# Patient Record
Sex: FEMALE | Race: WHITE | NOT HISPANIC OR LATINO | Employment: OTHER | URBAN - METROPOLITAN AREA
[De-identification: names, ages, dates, MRNs, and addresses within clinical notes are randomized per-mention and may not be internally consistent; named-entity substitution may affect disease eponyms.]

---

## 2017-01-12 ENCOUNTER — TELEPHONE (OUTPATIENT)
Dept: GASTROENTEROLOGY | Facility: CLINIC | Age: 72
End: 2017-01-12

## 2017-01-12 NOTE — TELEPHONE ENCOUNTER
----- Message from Nicole Boykin sent at 1/12/2017  2:50 PM CST -----  Contact: self [phone]  [facility] [place] [place] told her that you must request the CT on [facility] letter head.  Send it to fax # [phone].  Thank you!

## 2017-01-23 ENCOUNTER — OFFICE VISIT (OUTPATIENT)
Dept: GASTROENTEROLOGY | Facility: CLINIC | Age: 72
End: 2017-01-23
Payer: MEDICARE

## 2017-01-23 VITALS
HEART RATE: 75 BPM | SYSTOLIC BLOOD PRESSURE: 163 MMHG | BODY MASS INDEX: 30.88 KG/M2 | WEIGHT: 163.56 LBS | HEIGHT: 61 IN | DIASTOLIC BLOOD PRESSURE: 73 MMHG

## 2017-01-23 DIAGNOSIS — K57.30 DIVERTICULOSIS OF LARGE INTESTINE WITHOUT HEMORRHAGE: ICD-10-CM

## 2017-01-23 DIAGNOSIS — M79.7 FIBROMYALGIA: Primary | ICD-10-CM

## 2017-01-23 DIAGNOSIS — K62.1 RECTAL POLYP: ICD-10-CM

## 2017-01-23 DIAGNOSIS — Z80.0 FAMILY HISTORY OF COLON CANCER: ICD-10-CM

## 2017-01-23 DIAGNOSIS — K58.9 IRRITABLE BOWEL SYNDROME, UNSPECIFIED TYPE: ICD-10-CM

## 2017-01-23 PROCEDURE — 99214 OFFICE O/P EST MOD 30 MIN: CPT | Mod: S$PBB,,, | Performed by: INTERNAL MEDICINE

## 2017-01-23 PROCEDURE — 99212 OFFICE O/P EST SF 10 MIN: CPT | Mod: PBBFAC,PO | Performed by: INTERNAL MEDICINE

## 2017-01-23 PROCEDURE — 99999 PR PBB SHADOW E&M-EST. PATIENT-LVL II: CPT | Mod: PBBFAC,,, | Performed by: INTERNAL MEDICINE

## 2017-01-23 RX ORDER — LEVOMEFOLATE CALCIUM 7.5 MG
15 TABLET ORAL DAILY
COMMUNITY
End: 2023-10-17 | Stop reason: SDUPTHER

## 2017-01-23 RX ORDER — FLUOXETINE HYDROCHLORIDE 20 MG/1
20 CAPSULE ORAL DAILY
Refills: 2 | COMMUNITY
Start: 2016-12-22

## 2017-01-23 RX ORDER — LORATADINE 10 MG/1
10 TABLET ORAL DAILY
COMMUNITY

## 2017-01-23 NOTE — PROGRESS NOTES
"Subjective:       Patient ID: Iram Aguirre is a 71 y.o. female.    This is an established patient.      Chief Complaint: History of colon polyps    HPI Comments: Patient seen for personal history of colon polyps, diagnosed 10 years ago, single in number, unknown size, unknown histology, with associated signs/symptoms of longstanding IBS with waxing and waning change in bowel habits, and alleviating/exacerbating factors including none.  Her last colonoscopy was 2006 with Dr. Bourne.  She admits to a long history of functional GI problems related to anxiety and depression.  She states that she grew up in a dysfunctional family and her siblings all had similar problems as well.  She has a sister who had colon cancer.  She had a rectal polyp in the past but histology is unknown.  She denies bleeding or abdominal pain currently.  She has a long history of diverticulosis and this was again noted on recent CT at outside facility.      Review of Systems   Constitutional: Negative for chills, fatigue and fever.   HENT: Negative for sore throat and trouble swallowing.    Respiratory: Negative for cough, shortness of breath and wheezing.    Cardiovascular: Negative for chest pain and palpitations.   Gastrointestinal: Negative for abdominal pain, blood in stool, nausea and vomiting.        + change in bowel habits     Musculoskeletal: Negative for arthralgias and myalgias.   All other systems reviewed and are negative.      Objective:       Vitals:    01/23/17 1411   BP: (!) 163/73   Pulse: 75   Weight: 74.2 kg (163 lb 9.3 oz)   Height: 5' 0.5" (1.537 m)       Physical Exam   Constitutional: She appears well-developed and well-nourished.   HENT:   Head: Normocephalic and atraumatic.   Eyes: Pupils are equal, round, and reactive to light. No scleral icterus.   Cardiovascular: Normal rate and regular rhythm.    No murmur heard.  Pulmonary/Chest: Effort normal and breath sounds normal. She has no wheezes.   Abdominal: Soft. " Bowel sounds are normal. She exhibits no distension. There is no tenderness. There is no rebound and no guarding.   Neurological: She is alert.   Vitals reviewed.        Lab Results   Component Value Date    WBC 7.0 08/01/2012    HGB 12.7 08/01/2012    HCT 38.5 08/01/2012    MCV 95.1 08/01/2012     08/01/2012         Chemistry        Component Value Date/Time     08/01/2012 1139    K 3.3 (L) 08/01/2012 1139     08/01/2012 1139    CO2 31 08/01/2012 1139    BUN 12 08/01/2012 1139    CREATININE 0.7 08/01/2012 1139    CREATININE 0.9 08/01/2006 1109    GLU 99 08/01/2006 1109        Component Value Date/Time    CALCIUM 10.2 08/01/2012 1139    CALCIUM 10.3 08/01/2006 1109    ALKPHOS 56 08/01/2012 1139    ALKPHOS 75 08/01/2006 1109    AST 25 08/01/2012 1139    AST 28 08/01/2006 1109    ALT 21 08/01/2012 1139    BILITOT 0.7 08/01/2006 1109          CT scan was independently visualized and reviewed by me and showed diverticulosis.      Assessment:       1. Fibromyalgia    2. Rectal polyp    3. Irritable bowel syndrome, unspecified type    4. Diverticulosis of large intestine without hemorrhage    5. Family history of colon cancer        Plan:       1.  Schedule colonoscopy for history of polyps  2.  High fiber diet  3.  Further recommendations to follow after above.

## 2017-02-06 ENCOUNTER — ANESTHESIA (OUTPATIENT)
Dept: ENDOSCOPY | Facility: HOSPITAL | Age: 72
End: 2017-02-06
Payer: MEDICARE

## 2017-02-06 ENCOUNTER — HOSPITAL ENCOUNTER (OUTPATIENT)
Facility: HOSPITAL | Age: 72
Discharge: HOME | End: 2017-02-06
Attending: INTERNAL MEDICINE | Admitting: INTERNAL MEDICINE
Payer: MEDICARE

## 2017-02-06 ENCOUNTER — SURGERY (OUTPATIENT)
Age: 72
End: 2017-02-06

## 2017-02-06 ENCOUNTER — ANESTHESIA EVENT (OUTPATIENT)
Dept: ENDOSCOPY | Facility: HOSPITAL | Age: 72
End: 2017-02-06
Payer: MEDICARE

## 2017-02-06 VITALS — RESPIRATION RATE: 39 BRPM

## 2017-02-06 DIAGNOSIS — Z86.010 HX OF COLONIC POLYPS: ICD-10-CM

## 2017-02-06 DIAGNOSIS — K64.8 INTERNAL HEMORRHOIDS: Primary | ICD-10-CM

## 2017-02-06 PROBLEM — Z86.0100 HX OF COLONIC POLYPS: Status: ACTIVE | Noted: 2017-02-06

## 2017-02-06 PROCEDURE — 45378 DIAGNOSTIC COLONOSCOPY: CPT | Performed by: INTERNAL MEDICINE

## 2017-02-06 PROCEDURE — G0105 COLORECTAL SCRN; HI RISK IND: HCPCS | Mod: ,,, | Performed by: INTERNAL MEDICINE

## 2017-02-06 PROCEDURE — 63600175 PHARM REV CODE 636 W HCPCS: Performed by: NURSE ANESTHETIST, CERTIFIED REGISTERED

## 2017-02-06 PROCEDURE — 25000003 PHARM REV CODE 250: Performed by: INTERNAL MEDICINE

## 2017-02-06 PROCEDURE — D9220A PRA ANESTHESIA: Mod: 33,CRNA,, | Performed by: NURSE ANESTHETIST, CERTIFIED REGISTERED

## 2017-02-06 PROCEDURE — 37000008 HC ANESTHESIA 1ST 15 MINUTES: Performed by: INTERNAL MEDICINE

## 2017-02-06 PROCEDURE — 25000003 PHARM REV CODE 250: Performed by: NURSE ANESTHETIST, CERTIFIED REGISTERED

## 2017-02-06 PROCEDURE — 37000009 HC ANESTHESIA EA ADD 15 MINS: Performed by: INTERNAL MEDICINE

## 2017-02-06 PROCEDURE — G0105 COLORECTAL SCRN; HI RISK IND: HCPCS | Performed by: INTERNAL MEDICINE

## 2017-02-06 PROCEDURE — D9220A PRA ANESTHESIA: Mod: 33,ANES,, | Performed by: ANESTHESIOLOGY

## 2017-02-06 RX ORDER — PROPOFOL 10 MG/ML
INJECTION, EMULSION INTRAVENOUS
Status: DISCONTINUED
Start: 2017-02-06 | End: 2017-02-06 | Stop reason: HOSPADM

## 2017-02-06 RX ORDER — LIDOCAINE HYDROCHLORIDE 20 MG/ML
INJECTION, SOLUTION EPIDURAL; INFILTRATION; INTRACAUDAL; PERINEURAL
Status: DISCONTINUED
Start: 2017-02-06 | End: 2017-02-06 | Stop reason: HOSPADM

## 2017-02-06 RX ORDER — PROPOFOL 10 MG/ML
VIAL (ML) INTRAVENOUS
Status: DISCONTINUED | OUTPATIENT
Start: 2017-02-06 | End: 2017-02-06

## 2017-02-06 RX ORDER — SODIUM CHLORIDE 9 MG/ML
INJECTION, SOLUTION INTRAVENOUS CONTINUOUS
Status: DISCONTINUED | OUTPATIENT
Start: 2017-02-06 | End: 2017-02-06 | Stop reason: HOSPADM

## 2017-02-06 RX ORDER — LIDOCAINE HCL/PF 100 MG/5ML
SYRINGE (ML) INTRAVENOUS
Status: DISCONTINUED | OUTPATIENT
Start: 2017-02-06 | End: 2017-02-06

## 2017-02-06 RX ADMIN — PROPOFOL 50 MG: 10 INJECTION, EMULSION INTRAVENOUS at 11:02

## 2017-02-06 RX ADMIN — PROPOFOL 100 MG: 10 INJECTION, EMULSION INTRAVENOUS at 11:02

## 2017-02-06 RX ADMIN — LIDOCAINE HYDROCHLORIDE 100 MG: 20 INJECTION, SOLUTION INTRAVENOUS at 11:02

## 2017-02-06 RX ADMIN — SODIUM CHLORIDE: 0.9 INJECTION, SOLUTION INTRAVENOUS at 10:02

## 2017-02-06 NOTE — TRANSFER OF CARE
"Anesthesia Transfer of Care Note    Patient: Iram Aguirre    Procedure(s) Performed: Procedure(s) (LRB):  COLONOSCOPY (N/A)    Patient location: GI    Anesthesia Type: general    Transport from OR: Transported from OR on room air with adequate spontaneous ventilation    Post pain: adequate analgesia    Post assessment: no apparent anesthetic complications    Post vital signs: stable    Level of consciousness: alert, oriented and awake    Nausea/Vomiting: no nausea/vomiting    Complications: none          Last vitals:   Visit Vitals    BP (!) 149/70    Pulse 88    Temp 36.6 °C (97.8 °F) (Oral)    Resp (!) 22    Ht 5' 0.5" (1.537 m)    Wt 77.1 kg (170 lb)    SpO2 97%    Breastfeeding No    BMI 32.65 kg/m2     "

## 2017-02-06 NOTE — OR NURSING
Have you had a colonoscopy LESS THAN 3 years ago?   * If YES, answer these questions*:    1. Did patient have a prior colonic polyp in a previous surveillance/diagnostic colonoscopy and is 18 years or older on date of encounter?yes    2. Documentation of < 3 year interval since the patients last colonoscopy due to medical reasons (eg., last colonoscopy incomplete, last colonoscopy had inadequate prep, piecemeal removal of adenomas, or last colonoscopy found > 10 adenomas) ? no

## 2017-02-06 NOTE — ANESTHESIA PREPROCEDURE EVALUATION
02/06/2017  Iram Aguirre is a 71 y.o., female.    [facility] Anesthesia Evaluation    I have reviewed the Patient Summary Reports.    I have reviewed the Nursing Notes.      Review of Systems  Anesthesia Hx:  No problems with previous Anesthesia    Social:  Non-Smoker    Hematology/Oncology:  Hematology Normal       -- Cancer in past history:    Cardiovascular:   Hypertension, well controlled    Pulmonary:   Sleep Apnea, CPAP    Renal/:   Chronic Renal Disease    Hepatic/GI:   PUD, GERD    Musculoskeletal:  Musculoskeletal Normal    Neurological:  Neurology Normal    Endocrine:   Hypothyroidism    Dermatological:  Skin Normal    Psych:   Psychiatric History          Physical Exam  General:  Obesity    Airway/Jaw/Neck:  AIRWAY FINDINGS: Normal      Eyes/Ears/Nose:  EYES/EARS/NOSE FINDINGS: Normal   Dental:  DENTAL FINDINGS: Normal   Chest/Lungs:  Chest/Lungs Findings: Clear to auscultation     Heart/Vascular:  Heart Findings: Rate: Normal  Rhythm: Regular Rhythm  Sounds: Normal  Heart murmur: negative Vascular Findings: Normal    Abdomen:  Abdomen Findings: Normal    Musculoskeletal:  Musculoskeletal Findings: Normal   Skin:  Skin Findings: Normal    Mental Status:  Mental Status Findings: Normal        Anesthesia Plan  Type of Anesthesia, risks & benefits discussed:  Anesthesia Type:  general  Patient's Preference:   Intra-op Monitoring Plan:   Intra-op Monitoring Plan Comments:   Post Op Pain Control Plan:   Post Op Pain Control Plan Comments:   Induction:   IV  Beta Blocker:  Patient is not currently on a Beta-Blocker (No further documentation required).       Informed Consent: Patient understands risks and agrees with Anesthesia plan.  Questions answered. Anesthesia consent signed with patient.  ASA Score: 3     Day of Surgery Review of History & Physical:    H&P update referred to the provider.          Ready For Surgery From Anesthesia Perspective.

## 2017-02-06 NOTE — ANESTHESIA POSTPROCEDURE EVALUATION
"Anesthesia Post Evaluation    Patient: Iram Aguirre    Procedure(s) Performed: Procedure(s) (LRB):  COLONOSCOPY (N/A)    Final Anesthesia Type: general  Patient location during evaluation: PACU  Patient participation: Yes- Able to Participate  Level of consciousness: awake and alert  Post-procedure vital signs: reviewed and stable  Pain management: adequate  Airway patency: patent  PONV status at discharge: No PONV  Anesthetic complications: no      Cardiovascular status: hemodynamically stable  Respiratory status: unassisted and room air  Hydration status: euvolemic  Follow-up not needed.        Visit Vitals    /70    Pulse 72    Temp 36.7 °C (98 °F) (Oral)    Resp 20    Ht 5' 0.5" (1.537 m)    Wt 77.1 kg (170 lb)    SpO2 95%    Breastfeeding No    BMI 32.65 kg/m2       Pain/Damian Score: Pain Assessment Performed: Yes (2/6/2017 12:15 PM)  Presence of Pain: denies (2/6/2017 12:15 PM)  Damian Score: 10 (2/6/2017 12:15 PM)      "

## 2017-02-06 NOTE — IP AVS SNAPSHOT
[address] [place] [address]  Phone: [phone]           Patient Discharge Instructions     Our goal is to set you up for success. This packet includes information on your condition, medications, and your home care. It will help you to care for yourself so you don't get sicker and need to go back to the hospital.     Please ask your nurse if you have any questions.        There are many details to remember when preparing to leave the hospital. Here is what you will need to do:    1. Take your medicine. If you are prescribed medications, review your Medication List in the following pages. You may have new medications to  at the pharmacy and others that you'll need to stop taking. Review the instructions for how and when to take your medications. Talk with your doctor or nurses if you are unsure of what to do.     2. Go to your follow-up appointments. Specific follow-up information is listed in the following pages. Your may be contacted by a transition nurse or clinical provider about future appointments. Be sure we have all of the phone numbers to reach you, if needed. Please contact your provider's office if you are unable to make an appointment.     3. Watch for warning signs. Your doctor or nurse will give you detailed warning signs to watch for and when to call for assistance. These instructions may also include educational information about your condition. If you experience any of warning signs to your health, call your doctor.               [facility]  Unless otherwise directed by your provider, please contact [facility], our nurse care line that is available for 24/7 assistance.     [phone] (toll-free)    Registered nurses in the [facility] provide clinical advisement, health education, appointment booking, and other advisory services.                    ** Verify the list of medication(s) below is accurate and up to date.  Carry this with you in case of emergency. If your medications have changed, please notify your healthcare provider.             Medication List      CONTINUE taking these medications        Additional Info                      amlodipine 5 MG tablet   Commonly known as:  NORVASC   Refills:  0   Dose:  5 mg    Instructions:  Take 5 mg by mouth once daily.     Begin Date    AM    Noon    PM    Bedtime       B-complex with vitamin C tablet   Commonly known as:  Z-Bec or Equiv   Refills:  0   Dose:  1 tablet    Instructions:  Take 1 tablet by mouth once daily.     Begin Date    AM    Noon    PM    Bedtime       co-enzyme Q-10 30 mg capsule   Refills:  0   Dose:  30 mg    Instructions:  Take 30 mg by mouth once daily.     Begin Date    AM    Noon    PM    Bedtime       DEPLIN 7.5 mg Tab tablet   Refills:  0   Dose:  7.5 mg   Generic drug:  levomefolate calcium    Instructions:  Take 7.5 mg by mouth once daily.     Begin Date    AM    Noon    PM    Bedtime       fluoxetine 10 MG capsule   Commonly known as:  PROZAC   Refills:  2   Dose:  10 mg    Instructions:  Take 10 mg by mouth once daily.     Begin Date    AM    Noon    PM    Bedtime       fluticasone 50 mcg/actuation nasal spray   Commonly known as:  FLONASE   Refills:  0      Begin Date    AM    Noon    PM    Bedtime       ipratropium 0.03 % nasal spray   Commonly known as:  ATROVENT   Refills:  0      Begin Date    AM    Noon    PM    Bedtime       Lactobacillus rhamnosus GG 10 billion cell capsule   Commonly known as:  CULTURELLE   Refills:  0   Dose:  1 capsule    Instructions:  Take 1 capsule by mouth once daily.     Begin Date    AM    Noon    PM    Bedtime       losartan-hydrochlorothiazide 100-25 mg 100-25 mg per tablet   Commonly known as:  HYZAAR   Refills:  0      Begin Date    AM    Noon    PM    Bedtime       magnesium 30 mg Tab   Refills:  0   Dose:  30 mg    Instructions:  Take 30 mg by mouth once daily.     Begin Date    AM    Noon    PM    Bedtime        naproxen sodium 220 MG tablet   Commonly known as:  ANAPROX   Refills:  0   Dose:  220 mg    Instructions:  Take 220 mg by mouth daily as needed.     Begin Date    AM    Noon    PM    Bedtime       potassium chloride SA 10 MEQ tablet   Commonly known as:  K-DUR,KLOR-CON   Refills:  0   Dose:  10 mEq    Instructions:  Take 10 mEq by mouth 2 (two) times daily.     Begin Date    AM    Noon    PM    Bedtime       TIROSINT 88 mcg Cap   Refills:  0   Generic drug:  levothyroxine      Begin Date    AM    Noon    PM    Bedtime         ASK your doctor about these medications        Additional Info                      cetirizine 10 MG tablet   Commonly known as:  ZYRTEC   Refills:  0   Dose:  10 mg    Instructions:  Take 10 mg by mouth daily as needed.     Begin Date    AM    Noon    PM    Bedtime       escitalopram oxalate 20 MG tablet   Commonly known as:  LEXAPRO   Refills:  0      Begin Date    AM    Noon    PM    Bedtime       loratadine 10 mg tablet   Commonly known as:  CLARITIN   Refills:  0   Dose:  10 mg    Instructions:  Take 10 mg by mouth once daily.     Begin Date    AM    Noon    PM    Bedtime                  Please bring to all follow up appointments:    1. A copy of your discharge instructions.  2. All medicines you are currently taking in their original bottles.  3. Identification and insurance card.    Please arrive 15 minutes ahead of scheduled appointment time.    Please call 24 hours in advance if you must reschedule your appointment and/or time.        Follow-up Information     Follow up with Arturo Crane MD.    Specialty:  Gastroenterology    Why:  As needed    Contact information:    [address] [facility]  [address]  [place] [address]  [phone]          Discharge Instructions     Future Orders    Activity as tolerated     Diet general     Questions:    Total calories:      Fat restriction, if any:      Protein restriction, if any:      Na restriction, if any:      Fluid restriction:       Additional restrictions:          Discharge Instructions         Hemorrhoids    Hemorrhoids are swollen and inflamed veins inside the rectum and near the anus. The rectum is the last several inches of the colon. The anus is the passage between the rectum and the outside of the body.  Causes  The veins can become swollen due to increased pressure in them. This is most often caused by:  · Chronic constipation or diarrhea  · Straining when having a bowel movement  · Sitting too long on the toilet  · A low-fiber diet  · Pregnancy  Symptoms  · Bleeding from the rectum (this may be noticeable after bowel movements)  · Lump near the anus  · Itching around the anus  · Pain around the anus  There are different types of hemorrhoids. Depending on the type you have and the severity, you may be able to treat yourself at home. In some cases, a procedure may be the best treatment option. Your healthcare provider can tell you more about this, if needed.  Home care  General care  · To get relief from pain or itching, try:  ¨ Topical products. Your healthcare provider may prescribe or recommend creams, ointments, or pads that can be applied to the hemorrhoid. Use these exactly as directed.  ¨ Medicines. Your healthcare provider may recommend stool softeners, suppositories, or laxatives to help manage constipation. Use these exactly as directed.  ¨ Sitz baths. A sitz bath involves sitting in a few inches of warm bath water. Be careful not to make the water so hot that you burn yourself--test it before sitting in it. Soak for about 10 to 15 minutes a few times a day. This may help relieve pain.  Tips to help prevent hemorrhoids  · Eat more fiber. Fiber adds bulk to stool and absorbs water as it moves through your colon. This makes stool softer and easier to pass.  ¨ Increase the fiber in your diet with more fiber-rich foods. These include fresh fruit, vegetables, and whole grains.  ¨ Take a fiber supplement or bulking agent, if advised  "to by your provider. These include products such as psyllium or methylcellulose.  · Drink plenty of water, if directed to by your provider. This can help keep stool soft.  · Be more active. Frequent exercise aids digestion and helps prevent constipation. It may also help make bowel movements more regular.  · Dont strain during bowel movements. This can make hemorrhoids more likely. Also, dont sit on the toilet for long periods of time.  Follow-up care  Follow up with your healthcare provider, or as advised. If a culture or imaging tests were done, you will be notified of the results when they are ready. This may take a few days or longer.  When to seek medical advice  Call your healthcare provider right away if any of these occur:  · Increased bleeding from the rectum  · Increased pain around the rectum or anus  · Weakness or dizziness  Call 911  Call 911 or return to the emergency department right away if any of these occur:  · Trouble breathing or swallowing  · Fainting or loss of consciousness  · Unusually fast heart rate  · Vomiting blood  · Large amounts of blood in stool  Date Last Reviewed: 6/22/2015  © 7304-5318 [employer]. [address] [place] [address]. All rights reserved. This information is not intended as a substitute for professional medical care. Always follow your healthcare professional's instructions.            Admission Information     Date & Time Provider Department CSN    2/6/2017  9:16 AM Arturo Crane MD [facility] [id number]      Care Providers     Provider Role Specialty Primary office phone    Arturo Crane MD Attending Provider Gastroenterology [phone]    Arturo Crane MD Surgeon  Gastroenterology [phone]      Your Vitals Were     BP Pulse Temp Resp Height Weight    123/70 76 98 °F (36.7 °C) (Oral) 20 5' 0.5" (1.537 m) 77.1 kg (170 lb)    SpO2 BMI             95% 32.65 kg/m2         Recent Lab Values     No lab values to " display.      Allergies as of 2/6/2017        Reactions    Cortisone Shortness Of Breath    Causes heart to race    Hair Formula [Mv,iron,min-folic Acid-biotin]     dye    Levaquin [Levofloxacin] Other (See Comments)    Tongue swelling and blisters    Codeine Nausea Only      Advance Directives     An advance directive is a document which, in the event you are no longer able to make decisions for yourself, tells your healthcare team what kind of treatment you do or do not want to receive, or who you would like to make those decisions for you.  If you do not currently have an advance directive, [facility] encourages you to create one.  For more information call:  [phone]), [phone] ([phone]),  or log on to [email or web address].        Language Assistance Services     ATTENTION: Language assistance services are available, free of charge. Please call [phone].      ATENCIÓN: Si habla español, tiene a chatterjee disposición servicios gratuitos de asistencia lingüística. Llame al [phone].     [facility] Ý: N?u b?n nói Ti?ng Vi?t, có các d?ch v? h? tr? ngôn ng? mi?n phí dành cho b?n. G?i s? [phone].         [facility] complies with applicable Federal civil rights laws and does not discriminate on the basis of race, color, national origin, age, disability, or sex.

## 2017-02-06 NOTE — DISCHARGE INSTRUCTIONS
Hemorrhoids    Hemorrhoids are swollen and inflamed veins inside the rectum and near the anus. The rectum is the last several inches of the colon. The anus is the passage between the rectum and the outside of the body.  Causes  The veins can become swollen due to increased pressure in them. This is most often caused by:  · Chronic constipation or diarrhea  · Straining when having a bowel movement  · Sitting too long on the toilet  · A low-fiber diet  · Pregnancy  Symptoms  · Bleeding from the rectum (this may be noticeable after bowel movements)  · Lump near the anus  · Itching around the anus  · Pain around the anus  There are different types of hemorrhoids. Depending on the type you have and the severity, you may be able to treat yourself at home. In some cases, a procedure may be the best treatment option. Your healthcare provider can tell you more about this, if needed.  Home care  General care  · To get relief from pain or itching, try:  ¨ Topical products. Your healthcare provider may prescribe or recommend creams, ointments, or pads that can be applied to the hemorrhoid. Use these exactly as directed.  ¨ Medicines. Your healthcare provider may recommend stool softeners, suppositories, or laxatives to help manage constipation. Use these exactly as directed.  ¨ Sitz baths. A sitz bath involves sitting in a few inches of warm bath water. Be careful not to make the water so hot that you burn yourself--test it before sitting in it. Soak for about 10 to 15 minutes a few times a day. This may help relieve pain.  Tips to help prevent hemorrhoids  · Eat more fiber. Fiber adds bulk to stool and absorbs water as it moves through your colon. This makes stool softer and easier to pass.  ¨ Increase the fiber in your diet with more fiber-rich foods. These include fresh fruit, vegetables, and whole grains.  ¨ Take a fiber supplement or bulking agent, if advised to by your provider. These include products such as psyllium  or methylcellulose.  · Drink plenty of water, if directed to by your provider. This can help keep stool soft.  · Be more active. Frequent exercise aids digestion and helps prevent constipation. It may also help make bowel movements more regular.  · Dont strain during bowel movements. This can make hemorrhoids more likely. Also, dont sit on the toilet for long periods of time.  Follow-up care  Follow up with your healthcare provider, or as advised. If a culture or imaging tests were done, you will be notified of the results when they are ready. This may take a few days or longer.  When to seek medical advice  Call your healthcare provider right away if any of these occur:  · Increased bleeding from the rectum  · Increased pain around the rectum or anus  · Weakness or dizziness  Call 911  Call 911 or return to the emergency department right away if any of these occur:  · Trouble breathing or swallowing  · Fainting or loss of consciousness  · Unusually fast heart rate  · Vomiting blood  · Large amounts of blood in stool  Date Last Reviewed: 6/22/2015 © 2000-2016 [employer]. [address] [place] [address], [place] [address]. All rights reserved. This information is not intended as a substitute for professional medical care. Always follow your healthcare professional's instructions.

## 2017-02-07 VITALS
DIASTOLIC BLOOD PRESSURE: 70 MMHG | OXYGEN SATURATION: 95 % | BODY MASS INDEX: 32.1 KG/M2 | RESPIRATION RATE: 20 BRPM | SYSTOLIC BLOOD PRESSURE: 123 MMHG | HEIGHT: 61 IN | TEMPERATURE: 98 F | HEART RATE: 72 BPM | WEIGHT: 170 LBS

## 2018-05-29 ENCOUNTER — TELEPHONE (OUTPATIENT)
Dept: PODIATRY | Facility: CLINIC | Age: 73
End: 2018-05-29

## 2018-05-29 NOTE — TELEPHONE ENCOUNTER
----- Message from Lizz Morales sent at 5/29/2018 12:19 PM CDT -----  Contact: patient   Patient calling to speak to the Nurse. Please advise.   Call back    Thanks!

## 2018-05-30 ENCOUNTER — HOSPITAL ENCOUNTER (OUTPATIENT)
Dept: RADIOLOGY | Facility: HOSPITAL | Age: 73
Discharge: HOME | End: 2018-05-30
Attending: PODIATRIST
Payer: MEDICARE

## 2018-05-30 ENCOUNTER — TELEPHONE (OUTPATIENT)
Dept: PODIATRY | Facility: CLINIC | Age: 73
End: 2018-05-30

## 2018-05-30 ENCOUNTER — OFFICE VISIT (OUTPATIENT)
Dept: PODIATRY | Facility: CLINIC | Age: 73
End: 2018-05-30
Payer: MEDICARE

## 2018-05-30 VITALS
BODY MASS INDEX: 33.77 KG/M2 | HEIGHT: 60 IN | SYSTOLIC BLOOD PRESSURE: 149 MMHG | WEIGHT: 172 LBS | DIASTOLIC BLOOD PRESSURE: 63 MMHG | HEART RATE: 64 BPM | TEMPERATURE: 98 F

## 2018-05-30 DIAGNOSIS — S92.902A FRACTURE, FOOT, LEFT, CLOSED, INITIAL ENCOUNTER: ICD-10-CM

## 2018-05-30 DIAGNOSIS — M76.822 POSTERIOR TIBIAL TENDINITIS OF LEFT LOWER EXTREMITY: ICD-10-CM

## 2018-05-30 DIAGNOSIS — S92.902A FRACTURE, FOOT, LEFT, CLOSED, INITIAL ENCOUNTER: Primary | ICD-10-CM

## 2018-05-30 PROCEDURE — 99213 OFFICE O/P EST LOW 20 MIN: CPT | Mod: S$PBB,,, | Performed by: PODIATRIST

## 2018-05-30 PROCEDURE — 73630 X-RAY EXAM OF FOOT: CPT | Mod: TC,FY,LT

## 2018-05-30 PROCEDURE — 99213 OFFICE O/P EST LOW 20 MIN: CPT | Mod: 25,PBBFAC | Performed by: PODIATRIST

## 2018-05-30 PROCEDURE — 99999 PR PBB SHADOW E&M-EST. PATIENT-LVL III: CPT | Mod: 25,PBBFAC,, | Performed by: PODIATRIST

## 2018-05-30 PROCEDURE — 73630 X-RAY EXAM OF FOOT: CPT | Mod: 26,LT,, | Performed by: RADIOLOGY

## 2018-05-30 RX ORDER — MELOXICAM 15 MG/1
15 TABLET ORAL DAILY
Qty: 30 TABLET | Refills: 1 | Status: SHIPPED | OUTPATIENT
Start: 2018-05-30 | End: 2021-04-22

## 2018-05-30 NOTE — TELEPHONE ENCOUNTER
----- Message from Glenistheresa Concepcion sent at 5/30/2018 11:01 AM CDT -----  Patient states that she may have broken her foot and need an x-ray today.  Please call patient at [phone].

## 2018-05-30 NOTE — TELEPHONE ENCOUNTER
Left voicemail for patient requesting her to return phone call to office to find out her signs and symptoms today.

## 2018-05-30 NOTE — TELEPHONE ENCOUNTER
----- Message from William Lindsay sent at 5/30/2018 11:53 AM CDT -----  Contact: patient  Type:  Patient Returning Call    Who Called: patient  Who Left Message for Patient:  Delmi  Does the patient know what this is regarding?:  yes  Best Call Back Number:  [phone]  Additional Information:  Requesting a call back

## 2018-06-03 NOTE — PROGRESS NOTES
Subjective:       Patient ID: Iram Aguirre is a 72 y.o. female.    Chief Complaint: Foot Pain and Ankle Pain   Patient presents today she relates severe pain in the left foot she relates no injury or trauma to the area she states that she was out doing work in her yd the very next day she started to have severe discomfort.  HPI  Review of Systems   Musculoskeletal: Positive for arthralgias, gait problem, joint swelling and myalgias.   All other systems reviewed and are negative.      Objective:      Physical Exam   Constitutional: She appears well-developed and well-nourished.   Cardiovascular:   Pulses:       Dorsalis pedis pulses are 1+ on the right side, and 1+ on the left side.        Posterior tibial pulses are 1+ on the right side, and 1+ on the left side.   Musculoskeletal: She exhibits edema, tenderness and deformity.        Left foot: There is deformity.        Feet:    Feet:   Right Foot:   Protective Sensation: 4 sites tested. 4 sites sensed.   Left Foot:   Protective Sensation: 4 sites tested. 4 sites sensed.   Skin Integrity: Positive for erythema and warmth.   Neurological: She is alert.   Skin: Capillary refill takes 2 to 3 seconds.   Psychiatric: She has a normal mood and affect. Her behavior is normal. Judgment and thought content normal.     on evaluation patient displays skin breaks no active signs of infection noted there is considerable inflammation overlying the lateral portion of the patient's left foot as well as overlying the medial portion of the left foot at the insertion of the PT tendon on the navicular.  Patient has more pain in the medial portion of the left foot than the lateral portion increased skin temperature is noted in this area.  Assessment:       1. Fracture, foot, left, closed, initial encounter    2. Posterior tibial tendinitis of left lower extremity        Plan:       Following extensive evaluation discussion patient was working in her yd Monday and Tuesday she did  not injure her foot she did not twist her foot however by today she can barely stand or walk on her foot she is using a cane and can barely get around she states that she has been taking ibuprofen and icing the area I have recommended continued ice elevation I have ordered the patient fracture boot because I am concerned about the possibility of stress fracture even though the x-rays taken today displays no signs of fracturing stress fracture typically does not show up right away on plain film x-rays therefore I feel it would be best to put her in the boot this will also be helpful in addressing the diffuse tendinitis.  Patient advised is likely that she has posterior tibial tendonitis probably from overuse may be possibly even the shoe she was wearing.  Patient is to discontinue ibuprofen that she had been taking of started her on Mobic ice and elevation fracture boot at all times of weight-bearing and will see her for follow-up in 7-10 days to ensure that she is doing better.

## 2018-06-12 ENCOUNTER — OFFICE VISIT (OUTPATIENT)
Dept: PODIATRY | Facility: CLINIC | Age: 73
End: 2018-06-12
Payer: MEDICARE

## 2018-06-12 VITALS
SYSTOLIC BLOOD PRESSURE: 138 MMHG | WEIGHT: 172 LBS | HEIGHT: 61 IN | HEART RATE: 63 BPM | BODY MASS INDEX: 32.47 KG/M2 | DIASTOLIC BLOOD PRESSURE: 72 MMHG

## 2018-06-12 DIAGNOSIS — M76.822 POSTERIOR TIBIAL TENDINITIS OF LEFT LOWER EXTREMITY: Primary | ICD-10-CM

## 2018-06-12 PROCEDURE — 99999 PR PBB SHADOW E&M-EST. PATIENT-LVL III: CPT | Mod: PBBFAC,,, | Performed by: PODIATRIST

## 2018-06-12 PROCEDURE — 99213 OFFICE O/P EST LOW 20 MIN: CPT | Mod: S$PBB,,, | Performed by: PODIATRIST

## 2018-06-12 PROCEDURE — 99213 OFFICE O/P EST LOW 20 MIN: CPT | Mod: PBBFAC,PN | Performed by: PODIATRIST

## 2018-06-16 NOTE — PROGRESS NOTES
Subjective:       Patient ID: Iram Aguirre is a 72 y.o. female.    Chief Complaint: Follow-up   Patient presents today she relates severe pain in the left foot she relates no injury or trauma to the area she states that she was out doing work in her yd the very next day she started to have severe discomfort.  HPI  Review of Systems   Musculoskeletal: Positive for arthralgias, gait problem, joint swelling and myalgias.   All other systems reviewed and are negative.      Objective:      Physical Exam   Constitutional: She appears well-developed and well-nourished.   Cardiovascular:   Pulses:       Dorsalis pedis pulses are 1+ on the right side, and 1+ on the left side.        Posterior tibial pulses are 1+ on the right side, and 1+ on the left side.   Musculoskeletal: She exhibits edema, tenderness and deformity.        Left foot: There is deformity.        Feet:    Feet:   Right Foot:   Protective Sensation: 4 sites tested. 4 sites sensed.   Left Foot:   Protective Sensation: 4 sites tested. 4 sites sensed.   Skin Integrity: Positive for erythema and warmth.   Neurological: She is alert.   Skin: Capillary refill takes 2 to 3 seconds.   Psychiatric: She has a normal mood and affect. Her behavior is normal. Judgment and thought content normal.     on evaluation patient displays skin breaks no active signs of infection noted there is considerable inflammation overlying the lateral portion of the patient's left foot as well as overlying the medial portion of the left foot at the insertion of the PT tendon on the navicular.  Patient has more pain in the medial portion of the left foot than the lateral portion increased skin temperature is noted in this area.  Assessment:       1. Posterior tibial tendinitis of left lower extremity        Plan:         Following evaluation patient states that she is doing much better she has very limited discomfort right now in the left foot along the course of the posterior tibial  tendon there is limited inflammation in this area and overall the patient is doing a lot better she still has some mild swelling overlying the medial aspect of the left foot.  I did have a discussion with the patient she states that she has been tested for diabetes recently her most recent A1c was 5.7 and she was advised that she is at the early stages of borderline diabetes but is not officially diagnosed as a diabetic.  I have recommended continued gradual return to activity patient is to monitor the left foot and not overdo it ice elevation as needed follow-up as needed.

## 2019-11-07 ENCOUNTER — HOSPITAL ENCOUNTER (OUTPATIENT)
Facility: HOSPITAL | Age: 74
Discharge: HOME | End: 2019-11-08
Attending: EMERGENCY MEDICINE | Admitting: INTERNAL MEDICINE
Payer: MEDICARE

## 2019-11-07 DIAGNOSIS — I10 HYPERTENSION, UNSPECIFIED TYPE: Primary | ICD-10-CM

## 2019-11-07 DIAGNOSIS — R07.9 CHEST PAIN: ICD-10-CM

## 2019-11-07 PROBLEM — F41.9 ANXIETY: Status: ACTIVE | Noted: 2019-11-07

## 2019-11-07 LAB
ALBUMIN SERPL BCP-MCNC: 3.9 G/DL (ref 3.5–5.2)
ALP SERPL-CCNC: 65 U/L (ref 55–135)
ALT SERPL W/O P-5'-P-CCNC: 19 U/L (ref 10–44)
ANION GAP SERPL CALC-SCNC: 10 MMOL/L (ref 8–16)
AST SERPL-CCNC: 23 U/L (ref 10–40)
BASOPHILS # BLD AUTO: 0.03 K/UL (ref 0–0.2)
BASOPHILS NFR BLD: 0.4 % (ref 0–1.9)
BILIRUB SERPL-MCNC: 1.1 MG/DL (ref 0.1–1)
BNP SERPL-MCNC: 16 PG/ML (ref 0–99)
BUN SERPL-MCNC: 14 MG/DL (ref 8–23)
CALCIUM SERPL-MCNC: 9.6 MG/DL (ref 8.7–10.5)
CHLORIDE SERPL-SCNC: 100 MMOL/L (ref 95–110)
CO2 SERPL-SCNC: 27 MMOL/L (ref 23–29)
CREAT SERPL-MCNC: 1 MG/DL (ref 0.5–1.4)
DIFFERENTIAL METHOD: ABNORMAL
EOSINOPHIL # BLD AUTO: 0.1 K/UL (ref 0–0.5)
EOSINOPHIL NFR BLD: 1.1 % (ref 0–8)
ERYTHROCYTE [DISTWIDTH] IN BLOOD BY AUTOMATED COUNT: 12.8 % (ref 11.5–14.5)
EST. GFR  (AFRICAN AMERICAN): >60 ML/MIN/1.73 M^2
EST. GFR  (NON AFRICAN AMERICAN): 56 ML/MIN/1.73 M^2
GLUCOSE SERPL-MCNC: 116 MG/DL (ref 70–110)
HCT VFR BLD AUTO: 39.2 % (ref 37–48.5)
HGB BLD-MCNC: 12.8 G/DL (ref 12–16)
IMM GRANULOCYTES # BLD AUTO: 0.02 K/UL (ref 0–0.04)
IMM GRANULOCYTES NFR BLD AUTO: 0.3 % (ref 0–0.5)
INR PPP: 1.1
LYMPHOCYTES # BLD AUTO: 1.9 K/UL (ref 1–4.8)
LYMPHOCYTES NFR BLD: 25.3 % (ref 18–48)
MCH RBC QN AUTO: 31.3 PG (ref 27–31)
MCHC RBC AUTO-ENTMCNC: 32.7 G/DL (ref 32–36)
MCV RBC AUTO: 96 FL (ref 82–98)
MONOCYTES # BLD AUTO: 0.6 K/UL (ref 0.3–1)
MONOCYTES NFR BLD: 8.4 % (ref 4–15)
NEUTROPHILS # BLD AUTO: 4.9 K/UL (ref 1.8–7.7)
NEUTROPHILS NFR BLD: 64.5 % (ref 38–73)
NRBC BLD-RTO: 0 /100 WBC
PLATELET # BLD AUTO: 249 K/UL (ref 150–350)
PMV BLD AUTO: 9.5 FL (ref 9.2–12.9)
POTASSIUM SERPL-SCNC: 3.7 MMOL/L (ref 3.5–5.1)
PROT SERPL-MCNC: 7.5 G/DL (ref 6–8.4)
PROTHROMBIN TIME: 13.8 SEC (ref 10.6–14.8)
RBC # BLD AUTO: 4.09 M/UL (ref 4–5.4)
SODIUM SERPL-SCNC: 137 MMOL/L (ref 136–145)
TROPONIN I SERPL DL<=0.01 NG/ML-MCNC: <0.03 NG/ML (ref 0.02–0.04)
TROPONIN I SERPL DL<=0.01 NG/ML-MCNC: <0.03 NG/ML (ref 0.02–0.04)
WBC # BLD AUTO: 7.52 K/UL (ref 3.9–12.7)

## 2019-11-07 PROCEDURE — 99285 EMERGENCY DEPT VISIT HI MDM: CPT | Mod: 25

## 2019-11-07 PROCEDURE — G0378 HOSPITAL OBSERVATION PER HR: HCPCS

## 2019-11-07 PROCEDURE — 85025 COMPLETE CBC W/AUTO DIFF WBC: CPT

## 2019-11-07 PROCEDURE — 36415 COLL VENOUS BLD VENIPUNCTURE: CPT

## 2019-11-07 PROCEDURE — 84484 ASSAY OF TROPONIN QUANT: CPT

## 2019-11-07 PROCEDURE — 85610 PROTHROMBIN TIME: CPT

## 2019-11-07 PROCEDURE — 80053 COMPREHEN METABOLIC PANEL: CPT

## 2019-11-07 PROCEDURE — 63600175 PHARM REV CODE 636 W HCPCS: Performed by: EMERGENCY MEDICINE

## 2019-11-07 PROCEDURE — 94761 N-INVAS EAR/PLS OXIMETRY MLT: CPT

## 2019-11-07 PROCEDURE — 25000003 PHARM REV CODE 250: Performed by: EMERGENCY MEDICINE

## 2019-11-07 PROCEDURE — 25000003 PHARM REV CODE 250: Performed by: NURSE PRACTITIONER

## 2019-11-07 PROCEDURE — 96372 THER/PROPH/DIAG INJ SC/IM: CPT | Mod: 59

## 2019-11-07 PROCEDURE — 94660 CPAP INITIATION&MGMT: CPT

## 2019-11-07 PROCEDURE — 93005 ELECTROCARDIOGRAM TRACING: CPT

## 2019-11-07 PROCEDURE — 83880 ASSAY OF NATRIURETIC PEPTIDE: CPT

## 2019-11-07 PROCEDURE — 99900035 HC TECH TIME PER 15 MIN (STAT)

## 2019-11-07 PROCEDURE — 63600175 PHARM REV CODE 636 W HCPCS: Performed by: NURSE PRACTITIONER

## 2019-11-07 PROCEDURE — 84484 ASSAY OF TROPONIN QUANT: CPT | Mod: 91

## 2019-11-07 RX ORDER — SODIUM CHLORIDE 0.9 % (FLUSH) 0.9 %
10 SYRINGE (ML) INJECTION
Status: DISCONTINUED | OUTPATIENT
Start: 2019-11-07 | End: 2019-11-08 | Stop reason: HOSPADM

## 2019-11-07 RX ORDER — ENOXAPARIN SODIUM 100 MG/ML
40 INJECTION SUBCUTANEOUS EVERY 24 HOURS
Status: DISCONTINUED | OUTPATIENT
Start: 2019-11-07 | End: 2019-11-08 | Stop reason: HOSPADM

## 2019-11-07 RX ORDER — FLUTICASONE PROPIONATE 50 MCG
1 SPRAY, SUSPENSION (ML) NASAL DAILY
Status: DISCONTINUED | OUTPATIENT
Start: 2019-11-08 | End: 2019-11-08 | Stop reason: HOSPADM

## 2019-11-07 RX ORDER — TERAZOSIN 5 MG/1
5 CAPSULE ORAL NIGHTLY
Status: DISCONTINUED | OUTPATIENT
Start: 2019-11-07 | End: 2019-11-08 | Stop reason: HOSPADM

## 2019-11-07 RX ORDER — PANTOPRAZOLE SODIUM 40 MG/1
40 TABLET, DELAYED RELEASE ORAL
Status: COMPLETED | OUTPATIENT
Start: 2019-11-07 | End: 2019-11-07

## 2019-11-07 RX ORDER — ONDANSETRON 2 MG/ML
4 INJECTION INTRAMUSCULAR; INTRAVENOUS EVERY 8 HOURS PRN
Status: DISCONTINUED | OUTPATIENT
Start: 2019-11-07 | End: 2019-11-08 | Stop reason: HOSPADM

## 2019-11-07 RX ORDER — CETIRIZINE HYDROCHLORIDE 10 MG/1
10 TABLET ORAL DAILY
Status: DISCONTINUED | OUTPATIENT
Start: 2019-11-08 | End: 2019-11-08 | Stop reason: HOSPADM

## 2019-11-07 RX ORDER — LOSARTAN POTASSIUM 50 MG/1
100 TABLET ORAL DAILY
Status: DISCONTINUED | OUTPATIENT
Start: 2019-11-08 | End: 2019-11-08 | Stop reason: HOSPADM

## 2019-11-07 RX ORDER — TERAZOSIN 5 MG/1
5 CAPSULE ORAL NIGHTLY
COMMUNITY
End: 2021-09-08

## 2019-11-07 RX ORDER — LEVOTHYROXINE SODIUM 88 UG/1
88 TABLET ORAL DAILY
Status: DISCONTINUED | OUTPATIENT
Start: 2019-11-08 | End: 2019-11-08 | Stop reason: HOSPADM

## 2019-11-07 RX ORDER — NITROGLYCERIN 0.4 MG/1
0.4 TABLET SUBLINGUAL
Status: COMPLETED | OUTPATIENT
Start: 2019-11-07 | End: 2019-11-07

## 2019-11-07 RX ORDER — LANOLIN ALCOHOL/MO/W.PET/CERES
400 CREAM (GRAM) TOPICAL DAILY
Status: DISCONTINUED | OUTPATIENT
Start: 2019-11-08 | End: 2019-11-08 | Stop reason: HOSPADM

## 2019-11-07 RX ORDER — DIAZEPAM 2 MG/1
2 TABLET ORAL EVERY 8 HOURS PRN
Status: DISCONTINUED | OUTPATIENT
Start: 2019-11-07 | End: 2019-11-08 | Stop reason: HOSPADM

## 2019-11-07 RX ORDER — AMOXICILLIN 250 MG
1 CAPSULE ORAL 2 TIMES DAILY
Status: DISCONTINUED | OUTPATIENT
Start: 2019-11-07 | End: 2019-11-08 | Stop reason: HOSPADM

## 2019-11-07 RX ORDER — HYDROCHLOROTHIAZIDE 25 MG/1
25 TABLET ORAL DAILY
Status: DISCONTINUED | OUTPATIENT
Start: 2019-11-08 | End: 2019-11-08 | Stop reason: HOSPADM

## 2019-11-07 RX ORDER — FLUOXETINE HYDROCHLORIDE 20 MG/1
20 CAPSULE ORAL DAILY
Status: DISCONTINUED | OUTPATIENT
Start: 2019-11-08 | End: 2019-11-08 | Stop reason: HOSPADM

## 2019-11-07 RX ORDER — HYDRALAZINE HYDROCHLORIDE 20 MG/ML
10 INJECTION INTRAMUSCULAR; INTRAVENOUS EVERY 4 HOURS PRN
Status: DISCONTINUED | OUTPATIENT
Start: 2019-11-07 | End: 2019-11-08 | Stop reason: HOSPADM

## 2019-11-07 RX ORDER — ACETAMINOPHEN 325 MG/1
650 TABLET ORAL EVERY 4 HOURS PRN
Status: DISCONTINUED | OUTPATIENT
Start: 2019-11-07 | End: 2019-11-08 | Stop reason: HOSPADM

## 2019-11-07 RX ADMIN — NITROGLYCERIN 0.4 MG: 0.4 TABLET, ORALLY DISINTEGRATING SUBLINGUAL at 03:11

## 2019-11-07 RX ADMIN — NITROGLYCERIN 0.5 INCH: 20 OINTMENT TOPICAL at 03:11

## 2019-11-07 RX ADMIN — TERAZOSIN HYDROCHLORIDE 5 MG: 5 CAPSULE ORAL at 08:11

## 2019-11-07 RX ADMIN — PANTOPRAZOLE SODIUM 40 MG: 40 TABLET, DELAYED RELEASE ORAL at 03:11

## 2019-11-07 RX ADMIN — ENOXAPARIN SODIUM 40 MG: 100 INJECTION SUBCUTANEOUS at 08:11

## 2019-11-07 NOTE — H&P
[facility] Medicine  History & Physical    DOS: 11/07/2019  4:30 PM      Patient Name: Iram Aguirre  MRN: [id number]  Admission Date: 11/7/2019  Attending Physician: Dr. Gomes  Primary Care Provider: Ros Macedo NP         Patient information was obtained from patient, relative(s) and ER records.    Case personally discussed with Dr. Bolden, ER MD    Outside EKG personally reviewed     Subjective:     Principal Problem:Chest pain    Chief Complaint:   Chief Complaint   Patient presents with    Chest Pain        HPI: Ms. Aguirre presents today with complaints of chest pain. It is moderate. It is associated with increased social stressors and SOB. She denies fever, chills, sweats, N/V/D, dizziness, or LOC. Pain is located under the left breast and into the left back, it is reproducible with palpation, and nonexertional. She has a history of HTN, hypothyroid, RDUY, GERD, and HLD. Today, she had a PCP appt to discuss resuming prozac and her social stressors and she reported the chest pain. An EKG was obtained, and her daughter reports there was concern that she had a 2nd degree heart block and EMS was called to transport her to the hospital. The EKG provided by the clinic appears to be atrial bigeminy. She is a patient of Dr. CORTEZ Patrick and had an in-office stress test 2 months ago that was negative. She had an angiogram 2 years ago that showed normal coronaries. He was called per the ED MD and admission was recommended. 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 She also reports worries about her children as well.     Past Medical History:   Diagnosis Date    AR (allergic rhinitis) 11/29/2012    Benign hypertension     Depression     Diverticulosis 2005    Esophageal polyp     Fibromyalgia     Gastric ulcer; benign  2007    GERD (gastroesophageal reflux disease) 11/29/2012    Hypercholesteremia     Hypothyroidism     Kidney stones     Obstructive sleep apnea on CPAP     Osteopenia     Rectal polyp     Skin cancer     pre skin cancer skin    Vulvovaginal melanosis        Past Surgical History:   Procedure Laterality Date    CHOLECYSTECTOMY  08/08/2012    CHOLECYSTECTOMY      COLONOSCOPY N/A 2/6/2017    Procedure: COLONOSCOPY;  Surgeon: Arturo Crane MD;  Location: [facility];  Service: Endoscopy;  Laterality: N/A;    FOOT FRACTURE SURGERY  2005    plate in right foot    GANGLION CYST EXCISION  1980    left wrist    HYSTERECTOMY      KNEE ARTHROSCOPY W/ ACL RECONSTRUCTION      bilateral     NASAL SEPTUM SURGERY      TEAR DUCT SURGERY  2006    left eye    TOTAL KNEE ARTHROPLASTY      bilateral    TOTAL VAGINAL HYSTERECTOMY  age 31    w/BSO    TUBAL LIGATION  age 26       Review of patient's allergies indicates:   Allergen Reactions    Cortisone Shortness Of Breath     Causes heart to race    Hair formula [mv,iron,min-folic acid-biotin]      dye    Levaquin [levofloxacin] Other (See Comments)     Tongue swelling and blisters    Codeine Nausea Only    Doxycycline      Other reaction(s): Unknown       No current facility-administered medications on file prior to encounter.      Current Outpatient Medications on File Prior to Encounter   Medication Sig    B-complex with vitamin C tablet Take 1 tablet by mouth once daily.     co-enzyme Q-10 30 mg capsule Take 30 mg by mouth once daily.     fluoxetine (PROZAC) 10 MG capsule Take 20 mg by mouth once daily.     fluticasone (FLONASE) 50 mcg/actuation nasal spray 1 spray by Each Nostril route daily as needed.     ipratropium (ATROVENT) 0.03 % nasal spray 2 sprays by Nasal route daily as needed.     Lactobacillus rhamnosus GG (CULTURELLE) 10 billion cell capsule Take 1 capsule by mouth once daily.    levomefolate calcium (DEPLIN) 7.5 mg Tab tablet Take 15  mg by mouth once daily.     losartan-hydrochlorothiazide 100-25 mg (HYZAAR) 100-25 mg per tablet Take 1 tablet by mouth once daily.     magnesium 30 mg tablet Take 30 mg by mouth once daily.     mv-mn/folic acid/vit [id number] (ALIVE ONCE DAILY WOMEN 50 PLUS ORAL) Take 1 tablet by mouth once daily.    potassium chloride SA (K-DUR,KLOR-CON) 10 MEQ tablet Take 10 mEq by mouth 2 (two) times daily.     terazosin (HYTRIN) 5 MG capsule Take 5 mg by mouth every evening.    TIROSINT 88 mcg Cap Take 88 mcg by mouth once daily.     amlodipine (NORVASC) 5 MG tablet Take 5 mg by mouth once daily.     cetirizine (ZYRTEC) 10 MG tablet Take 10 mg by mouth daily as needed.     escitalopram oxalate (LEXAPRO) 20 MG tablet     loratadine (CLARITIN) 10 mg tablet Take 10 mg by mouth once daily.    meloxicam (MOBIC) 15 MG tablet Take 1 tablet (15 mg total) by mouth once daily.    naproxen sodium (ANAPROX) 220 MG tablet Take 220 mg by mouth daily as needed.      Family History     Problem Relation (Age of Onset)    Heart disease Father    Kidney disease Mother        Tobacco Use    Smoking status: Never Smoker    Smokeless tobacco: Never Used   Substance and Sexual Activity    Alcohol use: No    Drug use: No    Sexual activity: Not Currently     Partners: Male     Birth control/protection: None     Review of Systems   Constitutional: Negative for activity change, appetite change, chills, diaphoresis, fatigue, fever and unexpected weight change.   HENT: Negative for congestion, ear pain, facial swelling, hearing loss, sore throat and trouble swallowing.    Eyes: Negative for pain and discharge.   Respiratory: Negative for cough, chest tightness, shortness of breath and wheezing.    Cardiovascular: Positive for chest pain. Negative for palpitations and leg swelling.   Gastrointestinal: Negative for abdominal pain, blood in stool, diarrhea, nausea and vomiting.   Endocrine: Negative for polydipsia, polyphagia and polyuria.    Genitourinary: Negative for difficulty urinating, dysuria, flank pain, frequency and urgency.   Musculoskeletal: Positive for back pain. Negative for arthralgias, joint swelling, neck pain and neck stiffness.   Skin: Negative for rash and wound.   Allergic/Immunologic: Negative for environmental allergies and immunocompromised state.   Neurological: Negative for dizziness, seizures, syncope, speech difficulty, weakness, light-headedness, numbness and headaches.   Hematological: Negative for adenopathy.   Psychiatric/Behavioral: Negative for sleep disturbance and suicidal ideas. The patient is nervous/anxious.    All other systems reviewed and are negative.    Objective:     Vital Signs (Most Recent):  Pulse: 68 (11/07/19 1600)  Resp: (!) 34 (11/07/19 1531)  BP: (!) 164/72 (11/07/19 1600)  SpO2: 97 % (11/07/19 1600) Vital Signs (24h Range):  Pulse:  [53-68] 68  Resp:  [18-34] 34  SpO2:  [95 %-99 %] 97 %  BP: (141-165)/(64-73) 164/72   Respirations on exam are 18  Weight: 76.7 kg (169 lb)  Body mass index is 31.93 kg/m².    Physical Exam   Constitutional: She is oriented to person, place, and time. She appears well-developed and well-nourished.   HENT:   Head: Normocephalic and atraumatic.   Eyes: Pupils are equal, round, and reactive to light. EOM are normal.   Neck: Normal range of motion. Neck supple.   Cardiovascular: Normal rate, regular rhythm, normal heart sounds and intact distal pulses.   No murmur heard.  Pulmonary/Chest: Effort normal and breath sounds normal. No stridor. No respiratory distress. She has no wheezes.   Abdominal: Soft. Bowel sounds are normal. She exhibits no distension. There is no tenderness.   Musculoskeletal: Normal range of motion. She exhibits tenderness.   Pain with palpation under left breast and left side, no visible wounds or erythema   Neurological: She is alert and oriented to person, place, and time.   Skin: Skin is warm and dry. Capillary refill takes less than 2 seconds.    Psychiatric:   Tearful on exam   Nursing note and vitals reviewed.        CRANIAL NERVES     CN III, IV, VI   Pupils are equal, round, and reactive to light.  Extraocular motions are normal.        Significant Labs:   CBC:   Recent Labs   Lab 11/07/19  1357   WBC 7.52   HGB 12.8   HCT 39.2        CMP:   Recent Labs   Lab 11/07/19  1357      K 3.7      CO2 27   *   BUN 14   CREATININE 1.0   CALCIUM 9.6   PROT 7.5   ALBUMIN 3.9   BILITOT 1.1*   ALKPHOS 65   AST 23   ALT 19   ANIONGAP 10   EGFRNONAA 56.0*     Cardiac Markers:   Recent Labs   Lab 11/07/19  1357   BNP 16     Troponin:   Recent Labs   Lab 11/07/19  1357   TROPONINI <0.030       Significant Imaging: CXR: I have reviewed all pertinent results/findings within the past 24 hours and my personal findings are:  no acute consolidation noted  I have reviewed all pertinent imaging results/findings within the past 24 hours.     X-ray Chest Ap Portable    Result Date: 11/7/2019  EXAMINATION: XR CHEST AP PORTABLE CLINICAL HISTORY: chest pain; FINDINGS: Portable chest at 1409 compared with 05/17/2018 shows normal cardiomediastinal silhouette. Lungs are clear. Pulmonary vasculature is normal. No acute osseous abnormality.     Negative chest. Electronically signed by: Radu Foote MD Date:    11/07/2019 Time:    14:14  EKG: Sinus bradycardia  Right bundle branch block  Abnormal ECG  No previous ECGs available    Assessment/Plan:     * Chest pain  Atypical/reproducible   Admit to cardiology B   Consult Dr. CORTEZ Patrick - per ER MD who spoke with him  Trend troponins - first set negative  ASA   NTG patch per ER        Anxiety  Add low dose valium PRN while in the hospital   Pt has resumed prozac, but has only been taking for 3 weeks       Benign hypertension  Continue home meds  Hydralazine PRN   monitor      VTE Risk Mitigation (From admission, onward)         Ordered     enoxaparin injection 40 mg  Daily      11/07/19 1554     IP VTE HIGH RISK  PATIENT  Once      11/07/19 1554                   Gretchen Lamb NP  Department of Hospital Medicine   [facility]

## 2019-11-07 NOTE — ASSESSMENT & PLAN NOTE
Add low dose valium PRN while in the hospital   Pt has resumed prozac, but has only been taking for 3 weeks

## 2019-11-07 NOTE — ED NOTES
REPORTS CP PTA.  AT ARRIVAL PER EMS.  NSR.  AMBULATORY AND PAIN FREE.  NO SOB.  LARGE BELCH AT RISING WITH GOOD PAIN RELIEF.  ORIENTED X 3..  MONITORING INITIATED.  2LNC.  EKG DONE.

## 2019-11-07 NOTE — ASSESSMENT & PLAN NOTE
Atypical/reproducible   Admit to cardiology B   Consult Dr. CORTEZ Patrick - per ER MD who spoke with him  Trend troponins - first set negative  ASA   NTG patch per ER

## 2019-11-07 NOTE — ED NOTES
PT PULLED OUT IV ACCESS.  FAMILY AT BS. NSR. STATES SOME SELF RESOLVING DISCOMFORT BELOW L BREAST RADIATING TO BACK AND AXILLA RATED AT 2/10.  REASSESSED BY DR CLEMONS.

## 2019-11-07 NOTE — HPI
Ms. Aguirre presents today with complaints of chest pain. It is moderate. It is associated with increased social stressors and SOB. She denies fever, chills, sweats, N/V/D, dizziness, or LOC. Pain is located under the left breast and into the left back, it is reproducible with palpation, and nonexertional. She has a history of HTN, hypothyroid, RUDY, GERD, and HLD. Today, she had a PCP appt to discuss resuming prozac and her social stressors and she reported the chest pain. An EKG was obtained, and her daughter reports there was concern that she had a 2nd degree heart block and EMS was called to transport her to the hospital. The EKG provided by the clinic appears to be atrial bigeminy. She is a patient of Dr. CORTEZ Patrick and had an in-office stress test 2 months ago that was negative. She had an angiogram 2 years ago that showed normal coronaries. He was called per the ED MD and admission was recommended. 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 She also reports worries about her children as well.

## 2019-11-07 NOTE — ED PROVIDER NOTES
Encounter Date: 11/7/2019       History     Chief Complaint   Patient presents with    Chest Pain     74-year-old female who has a history of depression, fibromyalgia, hypertension, hypothyroidism, kidney stones, GERD, hyperlipidemia, presents emergency room with a history that she has been having left parasternal chest discomfort off and on for the last week.  The patient states that she had about a pain today was more severe if that reason came to the hospital.  The patient states that she was transported from [facility].  He stated and route she was given aspirin and 1 nitroglycerin which helped her pain. There was no associated shortness of breath or nausea.  No palpitations.  The patient states that she did have a stress test approximately 2 months ago which was reported negative. Her cardiologist is Dr. Patrick.  No complaint of any coughing or wheezing.  There is no pleuritic component to her pain.        Review of patient's allergies indicates:   Allergen Reactions    Cortisone Shortness Of Breath     Causes heart to race    Hair formula [mv,iron,min-folic acid-biotin]      dye    Levaquin [levofloxacin] Other (See Comments)     Tongue swelling and blisters    Codeine Nausea Only    Doxycycline      Other reaction(s): Unknown     Past Medical History:   Diagnosis Date    AR (allergic rhinitis) 11/29/2012    Benign hypertension     Depression     Diverticulosis 2005    Esophageal polyp     Fibromyalgia     Gastric ulcer; benign 2007    GERD (gastroesophageal reflux disease) 11/29/2012    Hypercholesteremia     Hypothyroidism     Kidney stones     Obstructive sleep apnea on CPAP     Osteopenia     Rectal polyp     Skin cancer     pre skin cancer skin    Vulvovaginal melanosis      Past Surgical History:   Procedure Laterality Date    CHOLECYSTECTOMY  08/08/2012    CHOLECYSTECTOMY      COLONOSCOPY N/A 2/6/2017    Procedure: COLONOSCOPY;  Surgeon: Arturo Crane MD;   Location: [facility];  Service: Endoscopy;  Laterality: N/A;    FOOT FRACTURE SURGERY  2005    plate in right foot    GANGLION CYST EXCISION  1980    left wrist    HYSTERECTOMY      KNEE ARTHROSCOPY W/ ACL RECONSTRUCTION      bilateral     NASAL SEPTUM SURGERY      TEAR DUCT SURGERY  2006    left eye    TOTAL KNEE ARTHROPLASTY      bilateral    TOTAL VAGINAL HYSTERECTOMY  age 31    w/BSO    TUBAL LIGATION  age 26     Family History   Problem Relation Age of Onset    Heart disease Father     Kidney disease Mother      Social History     Tobacco Use    Smoking status: Never Smoker    Smokeless tobacco: Never Used   Substance Use Topics    Alcohol use: No    Drug use: No     Review of Systems   Constitutional: Negative for activity change, appetite change, chills, diaphoresis and fever.   HENT: Negative for congestion, rhinorrhea, sinus pressure, sore throat and trouble swallowing.    Eyes: Negative for redness.   Respiratory: Negative for shortness of breath.    Cardiovascular: Positive for chest pain.   Gastrointestinal: Negative for abdominal pain, constipation and nausea.   Genitourinary: Negative for dysuria.   Musculoskeletal: Negative for back pain and myalgias.   Skin: Negative for rash.   Neurological: Negative for syncope, weakness, light-headedness, numbness and headaches.   Hematological: Does not bruise/bleed easily.   All other systems reviewed and are negative.      Physical Exam     Initial Vitals   BP Pulse Resp Temp SpO2   11/07/19 1230 11/07/19 1241 11/07/19 1241 -- 11/07/19 1241   (!) 141/64 61 (!) 26  99 %      MAP       --                Physical Exam    Constitutional: She appears well-developed and well-nourished. She is not diaphoretic. No distress.   HENT:   Head: Normocephalic and atraumatic.   Nose: Nose normal.   Mouth/Throat: Oropharynx is clear and moist. No oropharyngeal exudate.   Eyes: Conjunctivae are normal. Pupils are equal, round, and reactive to light. Right eye  exhibits no discharge. Left eye exhibits no discharge.   Neck: Normal range of motion. Neck supple. No thyromegaly present. No JVD present.   Cardiovascular: Normal rate, regular rhythm, normal heart sounds and intact distal pulses. Exam reveals no gallop and no friction rub.    No murmur heard.  Pulmonary/Chest: Breath sounds normal. No respiratory distress. She has no wheezes. She has no rhonchi. She has no rales. She exhibits no tenderness.   Abdominal: Soft. Bowel sounds are normal. She exhibits no distension. There is no tenderness. There is no rebound and no guarding.   Musculoskeletal: Normal range of motion. She exhibits no edema or tenderness.   Neurological: She is alert and oriented to person, place, and time. She has normal strength. No cranial nerve deficit. GCS score is 15. GCS eye subscore is 4. GCS verbal subscore is 5. GCS motor subscore is 6.   Skin: Skin is warm and dry. Capillary refill takes less than 2 seconds. No rash noted. No erythema. No pallor.   Psychiatric: She has a normal mood and affect. Her behavior is normal. Judgment and thought content normal.         ED Course   Procedures  Labs Reviewed   CBC W/ AUTO DIFFERENTIAL - Abnormal; Notable for the following components:       Result Value    Mean Corpuscular Hemoglobin 31.3 (*)     All other components within normal limits   COMPREHENSIVE METABOLIC PANEL - Abnormal; Notable for the following components:    Glucose 116 (*)     Total Bilirubin 1.1 (*)     eGFR if non  56.0 (*)     All other components within normal limits   B-TYPE NATRIURETIC PEPTIDE   TROPONIN I   PROTIME-INR        ECG Results          EKG 12-lead (In process)  Result time 11/07/19 14:09:36    In process by Interface, Lab In [facility] (11/07/19 14:09:36)                 Narrative:    Test Reason : R07.9,    Vent. Rate : 055 BPM     Atrial Rate : 055 BPM     P-R Int : 160 ms          QRS Dur : 124 ms      QT Int : 490 ms       P-R-T Axes : 053 050 026  degrees     QTc Int : 468 ms    Sinus bradycardia  Right bundle branch block  Abnormal ECG  No previous ECGs available    Referred By: [email or web address]   SELF           Confirmed By:                             Imaging Results          X-Ray Chest AP Portable (Final result)  Result time 11/07/19 14:14:44    Final result by Radu Foote MD (11/07/19 14:14:44)                 Impression:      Negative chest.      Electronically signed by: Radu Foote MD  Date:    11/07/2019  Time:    14:14             Narrative:    EXAMINATION:  XR CHEST AP PORTABLE    CLINICAL HISTORY:  chest pain;    FINDINGS:  Portable chest at 1409 compared with 05/17/2018 shows normal cardiomediastinal silhouette.    Lungs are clear. Pulmonary vasculature is normal. No acute osseous abnormality.                                              Attending Attestation:             Attending ED Notes:   This patient presented with chest pain improved with nitrates, has an EKG showing a sinus bradycardia with right bundle but no evidence of any acute injury pattern, ischemia or ectopy.  The patient's chest x-ray is negative.  Labs including BNP, troponin and CBC were normal.  The only abnormality chemistries is a blood sugar of 116.  During the ED course I did speak to Dr. Patrick, her cardiologist who suggested admission to a telemetry floor.  He requested that additional nitrates be given and the patient had orders for topical nitroglycerin.  He also requested that a PPI be added to her regimen and a formal consult him for the patient to be seen on the floor.                        Clinical Impression:       ICD-10-CM ICD-9-CM   1. Hypertension, unspecified type I10 401.9   2. Chest pain R07.9 786.50                             Jason Bolden Jr., MD  11/07/19 [id number]

## 2019-11-08 VITALS
TEMPERATURE: 98 F | BODY MASS INDEX: 32.18 KG/M2 | WEIGHT: 170.44 LBS | OXYGEN SATURATION: 96 % | HEART RATE: 62 BPM | HEIGHT: 61 IN | SYSTOLIC BLOOD PRESSURE: 134 MMHG | RESPIRATION RATE: 17 BRPM | DIASTOLIC BLOOD PRESSURE: 63 MMHG

## 2019-11-08 PROBLEM — R07.9 CHEST PAIN: Status: RESOLVED | Noted: 2019-11-07 | Resolved: 2019-11-08

## 2019-11-08 LAB
ANION GAP SERPL CALC-SCNC: 9 MMOL/L (ref 8–16)
BUN SERPL-MCNC: 21 MG/DL (ref 8–23)
CALCIUM SERPL-MCNC: 9.3 MG/DL (ref 8.7–10.5)
CHLORIDE SERPL-SCNC: 101 MMOL/L (ref 95–110)
CHOLEST SERPL-MCNC: 224 MG/DL (ref 120–199)
CHOLEST/HDLC SERPL: 4 {RATIO} (ref 2–5)
CO2 SERPL-SCNC: 28 MMOL/L (ref 23–29)
CREAT SERPL-MCNC: 1 MG/DL (ref 0.5–1.4)
EST. GFR  (AFRICAN AMERICAN): >60 ML/MIN/1.73 M^2
EST. GFR  (NON AFRICAN AMERICAN): 56 ML/MIN/1.73 M^2
ESTIMATED AVG GLUCOSE: 123 MG/DL (ref 68–131)
GLUCOSE SERPL-MCNC: 117 MG/DL (ref 70–110)
HBA1C MFR BLD HPLC: 5.9 % (ref 4.5–6.2)
HDLC SERPL-MCNC: 56 MG/DL (ref 40–75)
HDLC SERPL: 25 % (ref 20–50)
LDLC SERPL CALC-MCNC: 145 MG/DL (ref 63–159)
MAGNESIUM SERPL-MCNC: 1.8 MG/DL (ref 1.6–2.6)
NONHDLC SERPL-MCNC: 168 MG/DL
POTASSIUM SERPL-SCNC: 3.8 MMOL/L (ref 3.5–5.1)
SODIUM SERPL-SCNC: 138 MMOL/L (ref 136–145)
TRIGL SERPL-MCNC: 115 MG/DL (ref 30–150)
TROPONIN I SERPL DL<=0.01 NG/ML-MCNC: <0.03 NG/ML (ref 0.02–0.04)
TROPONIN I SERPL DL<=0.01 NG/ML-MCNC: <0.03 NG/ML (ref 0.02–0.04)

## 2019-11-08 PROCEDURE — 96374 THER/PROPH/DIAG INJ IV PUSH: CPT

## 2019-11-08 PROCEDURE — 83036 HEMOGLOBIN GLYCOSYLATED A1C: CPT

## 2019-11-08 PROCEDURE — 93005 ELECTROCARDIOGRAM TRACING: CPT

## 2019-11-08 PROCEDURE — 80048 BASIC METABOLIC PNL TOTAL CA: CPT

## 2019-11-08 PROCEDURE — 83735 ASSAY OF MAGNESIUM: CPT

## 2019-11-08 PROCEDURE — 80061 LIPID PANEL: CPT

## 2019-11-08 PROCEDURE — 25000003 PHARM REV CODE 250: Performed by: FAMILY MEDICINE

## 2019-11-08 PROCEDURE — 25000003 PHARM REV CODE 250: Performed by: INTERNAL MEDICINE

## 2019-11-08 PROCEDURE — 25000003 PHARM REV CODE 250: Performed by: NURSE PRACTITIONER

## 2019-11-08 PROCEDURE — G0378 HOSPITAL OBSERVATION PER HR: HCPCS

## 2019-11-08 PROCEDURE — 84484 ASSAY OF TROPONIN QUANT: CPT

## 2019-11-08 PROCEDURE — 36415 COLL VENOUS BLD VENIPUNCTURE: CPT

## 2019-11-08 PROCEDURE — 63600175 PHARM REV CODE 636 W HCPCS: Performed by: INTERNAL MEDICINE

## 2019-11-08 RX ORDER — PREDNISONE 20 MG/1
20 TABLET ORAL 2 TIMES DAILY
Status: DISCONTINUED | OUTPATIENT
Start: 2019-11-08 | End: 2019-11-08 | Stop reason: HOSPADM

## 2019-11-08 RX ORDER — MAGNESIUM SULFATE 1 G/100ML
1 INJECTION INTRAVENOUS ONCE
Status: COMPLETED | OUTPATIENT
Start: 2019-11-08 | End: 2019-11-08

## 2019-11-08 RX ORDER — PREDNISONE 20 MG/1
20 TABLET ORAL 2 TIMES DAILY
Qty: 6 TABLET | Refills: 0 | Status: SHIPPED | OUTPATIENT
Start: 2019-11-08 | End: 2019-11-11

## 2019-11-08 RX ORDER — NAPROXEN SODIUM 220 MG/1
81 TABLET, FILM COATED ORAL DAILY
Refills: 0 | COMMUNITY
Start: 2019-11-09 | End: 2021-04-22

## 2019-11-08 RX ORDER — POTASSIUM CHLORIDE 20 MEQ/1
20 TABLET, EXTENDED RELEASE ORAL ONCE
Status: COMPLETED | OUTPATIENT
Start: 2019-11-08 | End: 2019-11-08

## 2019-11-08 RX ORDER — ATORVASTATIN CALCIUM 40 MG/1
40 TABLET, FILM COATED ORAL NIGHTLY
Status: DISCONTINUED | OUTPATIENT
Start: 2019-11-08 | End: 2019-11-08 | Stop reason: HOSPADM

## 2019-11-08 RX ORDER — NAPROXEN SODIUM 220 MG/1
81 TABLET, FILM COATED ORAL DAILY
Status: DISCONTINUED | OUTPATIENT
Start: 2019-11-08 | End: 2019-11-08 | Stop reason: HOSPADM

## 2019-11-08 RX ORDER — ATORVASTATIN CALCIUM 40 MG/1
40 TABLET, FILM COATED ORAL NIGHTLY
Qty: 30 TABLET | Refills: 0 | Status: SHIPPED | OUTPATIENT
Start: 2019-11-08 | End: 2021-04-22

## 2019-11-08 RX ORDER — NAPROXEN 250 MG/1
250 TABLET ORAL ONCE AS NEEDED
Status: COMPLETED | OUTPATIENT
Start: 2019-11-08 | End: 2019-11-08

## 2019-11-08 RX ADMIN — LACTOBACILLUS TAB 1 TABLET: TAB at 11:11

## 2019-11-08 RX ADMIN — HYDROCHLOROTHIAZIDE 25 MG: 25 TABLET ORAL at 10:11

## 2019-11-08 RX ADMIN — POTASSIUM CHLORIDE 20 MEQ: 20 TABLET, EXTENDED RELEASE ORAL at 11:11

## 2019-11-08 RX ADMIN — PREDNISONE 20 MG: 20 TABLET ORAL at 11:11

## 2019-11-08 RX ADMIN — ACETAMINOPHEN 650 MG: 325 TABLET ORAL at 02:11

## 2019-11-08 RX ADMIN — ACETAMINOPHEN 650 MG: 325 TABLET ORAL at 07:11

## 2019-11-08 RX ADMIN — FLUOXETINE 20 MG: 20 CAPSULE ORAL at 10:11

## 2019-11-08 RX ADMIN — LEVOTHYROXINE SODIUM 88 MCG: 88 TABLET ORAL at 05:11

## 2019-11-08 RX ADMIN — MAGNESIUM OXIDE 400 MG: 400 TABLET ORAL at 10:11

## 2019-11-08 RX ADMIN — CETIRIZINE HYDROCHLORIDE 10 MG: 10 TABLET, FILM COATED ORAL at 10:11

## 2019-11-08 RX ADMIN — NAPROXEN 250 MG: 250 TABLET ORAL at 03:11

## 2019-11-08 RX ADMIN — MAGNESIUM SULFATE HEPTAHYDRATE 1 G: 1 INJECTION, SOLUTION INTRAVENOUS at 11:11

## 2019-11-08 RX ADMIN — LACTOBACILLUS TAB 1 TABLET: TAB at 07:11

## 2019-11-08 RX ADMIN — ASPIRIN 81 MG 81 MG: 81 TABLET ORAL at 10:11

## 2019-11-08 RX ADMIN — LOSARTAN POTASSIUM 100 MG: 50 TABLET, FILM COATED ORAL at 10:11

## 2019-11-08 NOTE — DISCHARGE INSTRUCTIONS
Notify MD of any new or ususual shortness of breath or any rapid or irregular heartbeats or any pain not relieved by medication.  Notify MD of any temp greater than 100.4  Notify MD of any dizziness episodes

## 2019-11-08 NOTE — PLAN OF CARE
Pt oriented to unit. Fall/safety precautions and plan of care reviewed with pt, pt voices understanding. Pt NPO after midnight. CPAP at night. EKG obtained for shoulder pain, MD notifed. Pain controlled with prn medication. Cardiac, labs, and vital sign monitoring ongoing.

## 2019-11-08 NOTE — CONSULTS
"[facility]  Cardiology  Consult    Patient Name: Iram Aguirre  MRN: [id number]  Admission Date: 11/7/2019  Code Status: Full Code   Attending Provider: Jeferson Talley MD   Primary Care Physician: Ros Macedo NP  Principal Problem:Chest pain    Patient information was obtained from patient, relative(s) and ER records.     Subjective:     Chief Complaint:  Chest pain     Per admit note  "HPI: Ms. Aguirre presents today with complaints of chest pain. It is moderate. It is associated with increased social stressors and SOB. She denies fever, chills, sweats, N/V/D, dizziness, or LOC. Pain is located under the left breast and into the left back, it is reproducible with palpation, and nonexertional. She has a history of HTN, hypothyroid, RUDY, GERD, and HLD. Today, she had a PCP appt to discuss resuming prozac and her social stressors and she reported the chest pain. An EKG was obtained, and her daughter reports there was concern that she had a 2nd degree heart block and EMS was called to transport her to the hospital. The EKG provided by the clinic appears to be atrial bigeminy. She is a patient of Dr. CORTEZ Patrick and had an in-office stress test 2 months ago that was negative. She had an angiogram 2 years ago that showed normal coronaries. He was called per the ED MD and admission was recommended. 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 She also reports worries about her children as well. "    Patient has  Been severely depressed due to multiple events in her family and her advanced illness of her .  She was started back on Prozac for a week ago  She has been helping him in personal care as well including bathing him and physical lifting him.  Reportedly patient had developed left-sided of left axillary pain of giving him a " shower and getting him ready to.  She felt very fatigued and tired and she has diffuse musculoskeletal pains as well as a left leg pain or subsequently she has seen the primary care physician the following day although she has reproducible pain upon the she has some atrial arrhythmias and she was sent to the ER by ambulance.  Since admission her troponins have been negative no further episodes of arrhythmias identified.  And patient has some musculoskeletal pains persisting but improved.  She normally takes Aleve with some moderate improvement of her symptoms.  She also suffers from symptoms of fibromyalgia and feels like he has exacerbated her.  She denies having any acute shortness of breath no cough or congestion no fevers or chills no nausea vomiting no blood in the stools.      Past Medical History:   Diagnosis Date    AR (allergic rhinitis) 11/29/2012    Benign hypertension     Depression     Diverticulosis 2005    Esophageal polyp     Fibromyalgia     Gastric ulcer; benign 2007    GERD (gastroesophageal reflux disease) 11/29/2012    Hypercholesteremia     Hypothyroidism     Kidney stones     MTHFR gene mutation     per daughter E72.8    Obstructive sleep apnea on CPAP     Osteopenia     Rectal polyp     Skin cancer     pre skin cancer skin    Vulvovaginal melanosis        Past Surgical History:   Procedure Laterality Date    CHOLECYSTECTOMY  08/08/2012    CHOLECYSTECTOMY      COLONOSCOPY N/A 2/6/2017    Procedure: COLONOSCOPY;  Surgeon: Arturo Crane MD;  Location[facility];  Service: Endoscopy;  Laterality: N/A;    FOOT FRACTURE SURGERY  2005    plate in right foot    GANGLION CYST EXCISION  1980    left wrist    HYSTERECTOMY      KNEE ARTHROSCOPY W/ ACL RECONSTRUCTION      bilateral     NASAL SEPTUM SURGERY      TEAR DUCT SURGERY  2006    left eye    TOTAL KNEE ARTHROPLASTY      bilateral    TOTAL VAGINAL HYSTERECTOMY  age 31    w/BSO    TUBAL LIGATION  age 26       Review  of patient's allergies indicates:   Allergen Reactions    Cortisone Shortness Of Breath     Causes heart to race    Hair formula [mv,iron,min-folic acid-biotin]      dye    Levaquin [levofloxacin] Other (See Comments)     Tongue swelling and blisters    Codeine Nausea Only    Doxycycline      Other reaction(s): Unknown       No current facility-administered medications on file prior to encounter.      Current Outpatient Medications on File Prior to Encounter   Medication Sig    B-complex with vitamin C tablet Take 1 tablet by mouth once daily.     co-enzyme Q-10 30 mg capsule Take 30 mg by mouth once daily.     fluoxetine (PROZAC) 10 MG capsule Take 20 mg by mouth once daily.     fluticasone (FLONASE) 50 mcg/actuation nasal spray 1 spray by Each Nostril route daily as needed.     ipratropium (ATROVENT) 0.03 % nasal spray 2 sprays by Nasal route daily as needed.     Lactobacillus rhamnosus GG (CULTURELLE) 10 billion cell capsule Take 1 capsule by mouth once daily.    levomefolate calcium (DEPLIN) 7.5 mg Tab tablet Take 15 mg by mouth once daily.     losartan-hydrochlorothiazide 100-25 mg (HYZAAR) 100-25 mg per tablet Take 1 tablet by mouth once daily.     magnesium 30 mg tablet Take 30 mg by mouth once daily.     mv-mn/folic acid/vit [id number] (ALIVE ONCE DAILY WOMEN 50 PLUS ORAL) Take 1 tablet by mouth once daily.    potassium chloride SA (K-DUR,KLOR-CON) 10 MEQ tablet Take 10 mEq by mouth 2 (two) times daily.     terazosin (HYTRIN) 5 MG capsule Take 5 mg by mouth every evening.    TIROSINT 88 mcg Cap Take 88 mcg by mouth once daily.     amlodipine (NORVASC) 5 MG tablet Take 5 mg by mouth once daily.     cetirizine (ZYRTEC) 10 MG tablet Take 10 mg by mouth daily as needed.     escitalopram oxalate (LEXAPRO) 20 MG tablet     loratadine (CLARITIN) 10 mg tablet Take 10 mg by mouth once daily.    meloxicam (MOBIC) 15 MG tablet Take 1 tablet (15 mg total) by mouth once daily.    naproxen sodium  (ANAPROX) 220 MG tablet Take 220 mg by mouth daily as needed.      Family History     Problem Relation (Age of Onset)    Heart disease Father    Kidney disease Mother        Tobacco Use    Smoking status: Never Smoker    Smokeless tobacco: Never Used   Substance and Sexual Activity    Alcohol use: No    Drug use: No    Sexual activity: Not Currently     Partners: Male     Birth control/protection: None       REVIEW OF SYSTEMS:    Constitutional: Negative for chills, fatigue and fever.   Eyes: No double vision, No blurred vision  Neuro: No headaches, No dizziness  Respiratory: Negative for cough, shortness of breath and wheezing.    Cardiovascular:  Positive for chest wall pain. Negative for palpitations and leg swelling.   Gastrointestinal: Negative for abdominal pain, No melena, diarrhea, nausea and vomiting.   Genitourinary: Negative for dysuria and frequency, Negative for hematuria  Skin: Negative for bruising, Negative for edema or discoloration noted.   Endocrine: Negative for polyphagia, Negative for heat intolerance, Negative for cold intolerance  Psychiatric: Negative for depression, Negative for anxiety, Negative for memory loss  Musculoskeletal:  Intermittent episodes of neck back and muscle aches.  Objective:     Vital Signs (Most Recent):  Temp: 97.9 °F (36.6 °C) (11/08/19 0741)  Pulse: 77 (11/08/19 0741)  Resp: 20 (11/08/19 0741)  BP: 139/61 (11/08/19 0741)  SpO2: 97 % (11/08/19 0741) Vital Signs (24h Range):  Temp:  [97.1 °F (36.2 °C)-98.1 °F (36.7 °C)] 97.9 °F (36.6 °C)  Pulse:  [53-77] 77  Resp:  [16-46] 20  SpO2:  [92 %-99 %] 97 %  BP: (112-165)/(57-73) 139/61     Weight: 77.3 kg (170 lb 6.7 oz)  Body mass index is 32.2 kg/m².    SpO2: 97 %  O2 Device (Oxygen Therapy): CPAP      Intake/Output Summary (Last 24 hours) at 11/8/2019 1052  Last data filed at 11/7/2019 2026  Gross per 24 hour   Intake 200 ml   Output --   Net 200 ml       Lines/Drains/Airways     Airway                 Airway -  Non-Surgical Endotracheal Tube -- days          Peripheral Intravenous Line                 Peripheral IV - Single Lumen 11/07/19 1258 20 G Left Hand less than 1 day                PHYSICAL EXAM:    GENERAL: well built, well nourished, well-developed in no apparent distress alert and oriented.   HEENT: Normocephalic. Pupils normal and conjunctivae normal.  Mucous membranes normal, no cyanosis or icterus, trachea central,no pallor or icterus is noted..   NECK: No JVD. No bruit..   THYROID: Thyroid not enlarged. No nodules present..   CARDIAC: Regular rate and rhythm. S1 is normal.S2 is normal.No gallops, clicks or murmurs noted at this time.  CHEST ANATOMY: normal.   LUNGS: Clear to auscultation. No wheezing or rhonchi..    ABDOMEN: Soft no masses or organomegaly.  No abdomen pulsations or bruits.  Normal bowel sounds. No pulsations and no masses felt, No guarding or rebound.   URINARY: No weber catheter   EXTREMITIES: No cyanosis, clubbing or edema noted at this time., no calf tenderness bilaterally.   PERIPHERAL VASCULAR SYSTEM: Good palpable distal pulses.   CENTRAL NERVOUS SYSTEM: No focal motor or sensory deficits noted.   SKIN: Skin without lesions, moist, well perfused.   MUSCLE STRENGTH & TONE: No noteable weakness, atrophy or abnormal movement.       Significant Labs:   Recent Lab Results       11/08/19  0409   11/07/19  2331   11/07/19  1911   11/07/19  1357        Albumin       3.9     Alkaline Phosphatase       65     ALT       19     Anion Gap 9     10     AST       23     Baso #       0.03     Basophil%       0.4     BILIRUBIN TOTAL       1.1  Comment:  For infants and newborns, interpretation of results should be based  on gestational age, weight and in agreement with clinical  observations.  Premature Infant recommended reference ranges:  Up to 24 hours.............<8.0 mg/dL  Up to 48 hours............<12.0 mg/dL  3-5 days..................<15.0 mg/dL  6-29 days.................<15.0 mg/dL       BNP        16  Comment:  Values of less than 100 pg/ml are consistent with non-CHF populations.     BUN, Bld 21     14     Calcium 9.3     9.6     Chloride 101     100     Cholesterol 224  Comment:  The National Cholesterol Education Program (NCEP) has set the  following guidelines (reference ranges) for Cholesterol:  Optimal.....................<200 mg/dL  Borderline High.............200-239 mg/dL  High........................> or = 240 mg/dL             CO2 28     27     Creatinine 1.0     1.0     Differential Method       Automated     eGFR if  >60.0     >60.0     eGFR if non  56.0  Comment:  Calculation used to obtain the estimated glomerular filtration  rate (eGFR) is the CKD-EPI equation.        56.0  Comment:  Calculation used to obtain the estimated glomerular filtration  rate (eGFR) is the CKD-EPI equation.        Eos #       0.1     Eosinophil%       1.1     Estimated Avg Glucose 123           Glucose 117     116     Gran # (ANC)       4.9     Gran%       64.5     HDL 56  Comment:  The National Cholesterol Education Program (NCEP) has set the  following guidelines (reference values) for HDL Cholesterol:  Low...............<40 mg/dL  Optimal...........>60 mg/dL             Hdl/Cholesterol Ratio 25.0           Hematocrit       39.2     Hemoglobin       12.8     Hemoglobin A1C External 5.9  Comment:  According to ADA guidelines, hemoglobin A1C <7.0% represents  optimal control in non-pregnant diabetic patients.  Different  metrics may apply to specific populations.   Standards of Medical Care in Diabetes - 2016.  For the purpose of screening for the presence of diabetes:  <5.7%     Consistent with the absence of diabetes  5.7-6.4%  Consistent with increasing risk for diabetes   (prediabetes)  >or=6.5%  Consistent with diabetes  Currently no consensus exists for use of hemoglobin A1C  for diagnosis of diabetes for children.             Immature Grans (Abs)       0.02  Comment:  Mild  elevation in immature granulocytes is non specific and   can be seen in a variety of conditions including stress response,   acute inflammation, trauma and pregnancy. Correlation with other   laboratory and clinical findings is essential.       Immature Granulocytes       0.3     Coumadin Monitoring INR       1.1  Comment:  Coumadin Therapy:  INR: 2.0-3.0 conventional anticoagulation  INR: 2.5-3.5 intensive anticoagulation       LDL Cholesterol External 145.0  Comment:  The National Cholesterol Education Program (NCEP) has set the  following guidelines (reference values) for LDL Cholesterol:  Optimal.......................<130 mg/dL  Borderline High...............130-159 mg/dL  High..........................160-189 mg/dL  Very High.....................>190 mg/dL             Lymph #       1.9     Lymph%       25.3     Magnesium 1.8           MCH       31.3     MCHC       32.7     MCV       96     Mono #       0.6     Mono%       8.4     MPV       9.5     Non-HDL Cholesterol 168  Comment:  Risk category and Non-HDL cholesterol goals:  Coronary heart disease (CHD)or equivalent (10-year risk of CHD >20%):  Non-HDL cholesterol goal     <130 mg/dL  Two or more CHD risk factors and 10-year risk of CHD <= 20%:  Non-HDL cholesterol goal     <160 mg/dL  0 to 1 CHD risk factor:  Non-HDL cholesterol goal     <190 mg/dL             nRBC       0     Platelets       249     Potassium 3.8     3.7     PROTEIN TOTAL       7.5     PT       13.8     RBC       4.09     RDW       12.8     Sodium 138     137     Total Cholesterol/HDL Ratio 4.0           Triglycerides 115  Comment:  The National Cholesterol Education Program (NCEP) has set the  following guidelines (reference values) for triglycerides:  Normal......................<150 mg/dL  Borderline High.............150-199 mg/dL  High........................200-499 mg/dL             Troponin I <0.030 <0.030 <0.030 <0.030     WBC       7.52           Significant Imaging:  Imaging  Results          X-Ray Chest AP Portable (Final result)  Result time 11/07/19 14:14:44    Final result by Radu Foote MD (11/07/19 14:14:44)                 Impression:      Negative chest.      Electronically signed by: Radu Foote MD  Date:    11/07/2019  Time:    14:14             Narrative:    EXAMINATION:  XR CHEST AP PORTABLE    CLINICAL HISTORY:  chest pain;    FINDINGS:  Portable chest at 1409 compared with 05/17/2018 shows normal cardiomediastinal silhouette.    Lungs are clear. Pulmonary vasculature is normal. No acute osseous abnormality.                                    I HAVE REVIEWED :    The vital signs, nurses notes, and all the pertinent radiology and labs.     Assessment and Plan:     Chest pain:  -appears noncardiac source of chest discomfort predominantly musculoskeletal etiology.  She had a recent myocardial perfusion study did not show any evidence of reversible ischemia.  As she has not dyspeptic symptoms also consider using pulse dose steroids to optimize the therapy.  We will try prednisone for musculoskeletal pain relief at this time and local therapy by heating pad   Myocardial perfusion study performed on 09/11/2019 shows normal symmetric uptake throughout the myocardium.  Normal LVEF        Essential hypertension  Continue home meds, continue losartan hydrochlorothiazide 100/25 mg daily  Continue potassium supplements and magnesium supplements, Hytrin 5 mg at bedtime  Hydralazine PRN   monitor    Anxiety needs aggressive management of her anxiety and she has significant associated family responsibilities and problems that are aggravating his symptoms as well.  Management per hospitalist    Dyslipidemia  Continue on strict diet control for time being.    History of mild aortic valve stenosis-   No intervention is needed at this time.       Active Diagnoses:    Diagnosis Date Noted POA    PRINCIPAL PROBLEM:  Chest pain [R07.9] 11/07/2019 Yes    Anxiety [F41.9] 11/07/2019 Yes     Benign hypertension [I10]  Yes      Problems Resolved During this Admission:           VTE Risk Mitigation (From admission, onward)         Ordered     enoxaparin injection 40 mg  Daily      11/07/19 1554     IP VTE HIGH RISK PATIENT  Once      11/07/19 1554                Real Patrick MD  Cardiology   [facility]      I have personally interviewed and examined the patient, discussed patient's condition and care with the patient, her daughter and the nursing staff as well as the hospitalist

## 2019-11-08 NOTE — DISCHARGE SUMMARY
[facility] Medicine  Discharge Summary      Patient Name: Iram Aguirre  MRN: [id number]  Admission Date: 11/7/2019  Hospital Length of Stay: 0 days  Discharge Date and Time:  11/08/2019 4:08 PM  Attending Physician: No att. providers found   Discharging Provider: Jeferson Talley MD  Primary Care Provider: Ros Macedo NP      HPI:   Ms. Aguirre presents today with complaints of chest pain. It is moderate. It is associated with increased social stressors and SOB. She denies fever, chills, sweats, N/V/D, dizziness, or LOC. Pain is located under the left breast and into the left back, it is reproducible with palpation, and nonexertional. She has a history of HTN, hypothyroid, RUDY, GERD, and HLD. Today, she had a PCP appt to discuss resuming prozac and her social stressors and she reported the chest pain. An EKG was obtained, and her daughter reports there was concern that she had a 2nd degree heart block and EMS was called to transport her to the hospital. The EKG provided by the clinic appears to be atrial bigeminy. She is a patient of Dr. CORTEZ Patrick and had an in-office stress test 2 months ago that was negative. She had an angiogram 2 years ago that showed normal coronaries. He was called per the ED MD and admission was recommended. 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 She also reports worries about her children as well.     * No surgery found *      Hospital Course:   Serial cardiac enzymes were negative.  Patient had a stress test 2 months ago that was unremarkable.  Cardiology saw patient and had low suspicion of cardiac causes.  Patient remained chest pain-free during hospitalization.  .  Patient was started on a statin.  HbA1c 5.9.  Will need to follow up with PCP for prediabetes.  Patient had  longstanding left shoulder pain that upon examination was likely related to her rotator cuff.  Advised patient to follow up with PCP for management.  Patient was stable at discharge to home with instructions to follow up with PCP and Cardiology.    General: Patient resting comfortably in no acute distress. Appears as stated age. Calm  Eyes: EOM intact. No conjunctivae injection. No scleral icterus.  ENT: Hearing grossly intact. No discharge from ears. No nasal discharge.   CVS: RRR. No LE edema BL.  Lungs: CTA BL, no wheezing or crackles. Good breath sounds. No accessory muscle use. No acute respiratory distress  Neuro: AOx3. GCS 15. Cranial nerves grossly intact. Moves all extremities equally. Follows commands. Responds appropriately      Consults:   Consults (From admission, onward)        Status Ordering Provider     Inpatient consult to Cardiology  Once     Provider:  Real Patrick MD    Completed SHARONA LÓPEZ          No new Assessment & Plan notes have been filed under this hospital service since the last note was generated.  Service: Hospital Medicine    Final Active Diagnoses:    Diagnosis Date Noted POA    Anxiety [F41.9] 11/07/2019 Yes    Benign hypertension [I10]  Yes      Problems Resolved During this Admission:    Diagnosis Date Noted Date Resolved POA    PRINCIPAL PROBLEM:  Chest pain [R07.9] 11/07/2019 11/08/2019 Yes       Discharged Condition: stable    Disposition: Home or Self Care    Follow Up:  Follow-up Information     Real Patrick MD In 2 weeks.    Specialty:  Cardiology  Why:  For check up s/p hospital discharge  Contact information:  [address]  [address]  [place] [id number] [phone]             Ros Macedo NP In 1 week.    Specialty:  Family Medicine  Why:  For check up s/p hospital discharge  Contact information:  [address] [place] [address] [phone]                 Patient Instructions:      Diet Cardiac     Notify your health care  provider if you experience any of the following:  temperature >100.4     Notify your health care provider if you experience any of the following:  persistent nausea and vomiting or diarrhea     Notify your health care provider if you experience any of the following:  severe uncontrolled pain     Notify your health care provider if you experience any of the following:  redness, tenderness, or signs of infection (pain, swelling, redness, odor or green/yellow discharge around incision site)     Notify your health care provider if you experience any of the following:  difficulty breathing or increased cough     Notify your health care provider if you experience any of the following:  severe persistent headache     Notify your health care provider if you experience any of the following:  worsening rash     Notify your health care provider if you experience any of the following:  persistent dizziness, light-headedness, or visual disturbances     Notify your health care provider if you experience any of the following:  increased confusion or weakness     Activity as tolerated       Significant Diagnostic Studies: Labs:   CMP   Recent Labs   Lab 11/07/19  1357 11/08/19  0409    138   K 3.7 3.8    101   CO2 27 28   * 117*   BUN 14 21   CREATININE 1.0 1.0   CALCIUM 9.6 9.3   PROT 7.5  --    ALBUMIN 3.9  --    BILITOT 1.1*  --    ALKPHOS 65  --    AST 23  --    ALT 19  --    ANIONGAP 10 9   ESTGFRAFRICA >60.0 >60.0   EGFRNONAA 56.0* 56.0*   , CBC   Recent Labs   Lab 11/07/19  1357   WBC 7.52   HGB 12.8   HCT 39.2      , Lipid Panel   Lab Results   Component Value Date    CHOL 224 (H) 11/08/2019    HDL 56 11/08/2019    LDLCALC 145.0 11/08/2019    TRIG 115 11/08/2019    CHOLHDL 25.0 11/08/2019   , Troponin   Recent Labs   Lab 11/08/19  0409   TROPONINI <0.030    and A1C:   Recent Labs   Lab 11/08/19 0409   HGBA1C 5.9       Pending Diagnostic Studies:     None         Medications:  Reconciled Home  Medications:      Medication List      START taking these medications    aspirin 81 MG Chew  Take 1 tablet (81 mg total) by mouth once daily.  Start taking on:  November 9, 2019     atorvastatin 40 MG tablet  Commonly known as:  LIPITOR  Take 1 tablet (40 mg total) by mouth every evening.     predniSONE 20 MG tablet  Commonly known as:  DELTASONE  Take 1 tablet (20 mg total) by mouth 2 (two) times daily. for 3 days        CONTINUE taking these medications    ALIVE ONCE DAILY WOMEN 50 PLUS ORAL  Take 1 tablet by mouth once daily.     amLODIPine 5 MG tablet  Commonly known as:  NORVASC  Take 5 mg by mouth once daily.     B-complex with vitamin C tablet  Commonly known as:  Z-Bec or Equiv  Take 1 tablet by mouth once daily.     cetirizine 10 MG tablet  Commonly known as:  ZYRTEC  Take 10 mg by mouth daily as needed.     co-enzyme Q-10 30 mg capsule  Take 30 mg by mouth once daily.     DEPLIN 7.5 mg Tab tablet  Generic drug:  levomefolate calcium  Take 15 mg by mouth once daily.     escitalopram oxalate 20 MG tablet  Commonly known as:  LEXAPRO     FLUoxetine 10 MG capsule  Take 20 mg by mouth once daily.     fluticasone propionate 50 mcg/actuation nasal spray  Commonly known as:  FLONASE  1 spray by Each Nostril route daily as needed.     ipratropium 0.03 % nasal spray  Commonly known as:  ATROVENT  2 sprays by Nasal route daily as needed.     Lactobacillus rhamnosus GG 10 billion cell capsule  Commonly known as:  CULTURELLE  Take 1 capsule by mouth once daily.     loratadine 10 mg tablet  Commonly known as:  CLARITIN  Take 10 mg by mouth once daily.     losartan-hydrochlorothiazide 100-25 mg 100-25 mg per tablet  Commonly known as:  HYZAAR  Take 1 tablet by mouth once daily.     magnesium 30 mg Tab  Take 30 mg by mouth once daily.     meloxicam 15 MG tablet  Commonly known as:  MOBIC  Take 1 tablet (15 mg total) by mouth once daily.     naproxen sodium 220 MG tablet  Commonly known as:  ANAPROX  Take 220 mg by mouth  daily as needed.     potassium chloride SA 10 MEQ tablet  Commonly known as:  K-DUR,KLOR-CON  Take 10 mEq by mouth 2 (two) times daily.     terazosin 5 MG capsule  Commonly known as:  HYTRIN  Take 5 mg by mouth every evening.     TIROSINT 88 mcg Cap  Generic drug:  levothyroxine  Take 88 mcg by mouth once daily.            Indwelling Lines/Drains at time of discharge:   Lines/Drains/Airways     Airway                 Airway - Non-Surgical Endotracheal Tube -- days                Time spent on the discharge of patient: 25 minutes  Patient was seen and examined on the date of discharge and determined to be suitable for discharge.         Jeferson Talley MD  Department of Hospital Medicine  [facility]  Date of service: 11/08/2019 4:09 PM

## 2019-11-08 NOTE — PLAN OF CARE
11/08/19 1117   ARGUETA Message   Medicare Outpatient and Observation Notification regarding financial responsibility Given to patient/caregiver;Explained to patient/caregiver;Signed/date by patient/caregiver   Date ARGUETA was signed 11/08/19   Time ARGUETA was signed 1114     Introduced self and asked pt if ok to take few min to discuss Medicare form, and ok with pt.  Explained that pt in observation status and Medicare wants to inform them of ARGUETA form, pt verbalized an understanding to form, form signed and form scanned into CM notes.

## 2019-11-08 NOTE — HOSPITAL COURSE
Serial cardiac enzymes were negative.  Patient had a stress test 2 months ago that was unremarkable.  Cardiology saw patient and had low suspicion of cardiac causes.  Patient remained chest pain-free during hospitalization.  .  Patient was started on a statin.  HbA1c 5.9.  Will need to follow up with PCP for prediabetes.  Patient had longstanding left shoulder pain that upon examination was likely related to her rotator cuff.  Advised patient to follow up with PCP for management.  Patient was stable at discharge to home with instructions to follow up with PCP and Cardiology.    General: Patient resting comfortably in no acute distress. Appears as stated age. Calm  Eyes: EOM intact. No conjunctivae injection. No scleral icterus.  ENT: Hearing grossly intact. No discharge from ears. No nasal discharge.   CVS: RRR. No LE edema BL.  Lungs: CTA BL, no wheezing or crackles. Good breath sounds. No accessory muscle use. No acute respiratory distress  Neuro: AOx3. GCS 15. Cranial nerves grossly intact. Moves all extremities equally. Follows commands. Responds appropriately

## 2020-09-24 ENCOUNTER — TELEPHONE (OUTPATIENT)
Dept: PODIATRY | Facility: CLINIC | Age: 75
End: 2020-09-24

## 2020-09-24 NOTE — TELEPHONE ENCOUNTER
----- Message from Lorenza Reyes sent at 9/24/2020  2:01 PM CDT -----  Regarding: Appt request  Contact: [facility]  Message    Appointment Request From: Iram Aguirre    With Provider: Thierry Jurado DPM [[facility] - Podiatry/Wound Care]    Preferred Date Range: Any date 9/21/2020 or later    Preferred Times: Any Time    Reason for visit: My left foot arch is swollen and it may be fractured. No falls but woke up with swelling and pain. Need to see dr today    Comments:  X-ray left arch when arch is swollen. In pain. Hard to walk. Been working in yard last week may be cause

## 2020-09-24 NOTE — TELEPHONE ENCOUNTER
Spoke to patient and states she sent this message a few days ago. Her foot is not better if this happens again she will call the office an appointment.

## 2020-10-21 ENCOUNTER — TELEPHONE (OUTPATIENT)
Dept: CARDIOLOGY | Facility: CLINIC | Age: 75
End: 2020-10-21

## 2020-10-21 NOTE — TELEPHONE ENCOUNTER
----- Message from David Mendiola sent at 10/21/2020  2:13 PM CDT -----  Regarding: appt  Says she had an appt and it was cx because it was over booked yall r/s to this Friday and she accidentally cx can she be added back in and please call her to confirm     [phone]

## 2020-10-23 ENCOUNTER — OFFICE VISIT (OUTPATIENT)
Dept: CARDIOLOGY | Facility: CLINIC | Age: 75
End: 2020-10-23
Payer: MEDICARE

## 2020-10-23 VITALS
HEIGHT: 61 IN | SYSTOLIC BLOOD PRESSURE: 144 MMHG | RESPIRATION RATE: 16 BRPM | HEART RATE: 67 BPM | TEMPERATURE: 97 F | WEIGHT: 169 LBS | DIASTOLIC BLOOD PRESSURE: 80 MMHG | BODY MASS INDEX: 31.91 KG/M2 | OXYGEN SATURATION: 97 %

## 2020-10-23 DIAGNOSIS — I35.0 AORTIC VALVE STENOSIS, ETIOLOGY OF CARDIAC VALVE DISEASE UNSPECIFIED: Primary | ICD-10-CM

## 2020-10-23 DIAGNOSIS — E07.9 THYROID DYSFUNCTION: ICD-10-CM

## 2020-10-23 DIAGNOSIS — E78.5 DYSLIPIDEMIA: ICD-10-CM

## 2020-10-23 DIAGNOSIS — I10 ESSENTIAL HYPERTENSION: ICD-10-CM

## 2020-10-23 PROCEDURE — 99213 PR OFFICE/OUTPT VISIT, EST, LEVL III, 20-29 MIN: ICD-10-PCS | Mod: S$GLB,,, | Performed by: NURSE PRACTITIONER

## 2020-10-23 PROCEDURE — 99213 OFFICE O/P EST LOW 20 MIN: CPT | Mod: S$GLB,,, | Performed by: NURSE PRACTITIONER

## 2020-10-23 NOTE — PROGRESS NOTES
Subjective:    Patient ID:  Iram Aguirre is a 75 y.o. female patient here for evaluation Hyperlipidemia, Anxiety, Gastroesophageal Reflux, Thyroid Problem, Sleep Apnea, and Hypertension      History of Present Illness:       75 year old female with a PMH significant for HTN, Dyslipidemia, GERD, Sleep apnea, and GERD.  She has been fasting and has lost some weight. She is feeling better. Her aches and pain have improved. Her blood pressure at home has been controlled nicely.  She denies any chest pain, SOB, or palpitations. NO arm neck or jaw pain.       Review of patient's allergies indicates:   Allergen Reactions    Cortisone Shortness Of Breath     Causes heart to race    Hair formula [mv,iron,min-folic acid-biotin]      dye    Levaquin [levofloxacin] Other (See Comments)     Tongue swelling and blisters    Codeine Nausea Only    Doxycycline      Other reaction(s): Unknown       Past Medical History:   Diagnosis Date    AR (allergic rhinitis) 11/29/2012    Benign hypertension     Depression     Diverticulosis 2005    Esophageal polyp     Fibromyalgia     Gastric ulcer; benign 2007    GERD (gastroesophageal reflux disease) 11/29/2012    Hypercholesteremia     Hypothyroidism     Kidney stones     MTHFR gene mutation     per daughter E72.8    Obstructive sleep apnea on CPAP     Osteopenia     Rectal polyp     Skin cancer     pre skin cancer skin    Vulvovaginal melanosis      Past Surgical History:   Procedure Laterality Date    CHOLECYSTECTOMY  08/08/2012    CHOLECYSTECTOMY      COLONOSCOPY N/A 2/6/2017    Procedure: COLONOSCOPY;  Surgeon: Arturo Crane MD;  Location: [facility];  Service: Endoscopy;  Laterality: N/A;    FOOT FRACTURE SURGERY  2005    plate in right foot    GANGLION CYST EXCISION  1980    left wrist    HYSTERECTOMY      KNEE ARTHROSCOPY W/ ACL RECONSTRUCTION      bilateral     NASAL SEPTUM SURGERY      TEAR DUCT SURGERY  2006    left eye    TOTAL KNEE  ARTHROPLASTY      bilateral    TOTAL VAGINAL HYSTERECTOMY  age 31    w/BSO    TUBAL LIGATION  age 26     Social History     Tobacco Use    Smoking status: Never Smoker    Smokeless tobacco: Never Used   Substance Use Topics    Alcohol use: No    Drug use: No        Review of Systems:    As noted in HPI in addition      REVIEW OF SYSTEMS  CARDIOVASCULAR: No recent chest pain, palpitations, arm, neck, or jaw pain  RESPIRATORY: No recent fever, cough chills, SOB or congestion  : No blood in the urine  GI: No Nausea, vomiting, constipation, diarrhea, blood, or reflux.  MUSCULOSKELETAL: No myalgias  NEURO: No lightheadedness or dizziness  EYES: No Double vision, blurry, vision or headache              Objective:        Vitals:    10/23/20 1145   BP: (!) 144/80   Pulse: 67   Resp: 16   Temp: 97.2 °F (36.2 °C)       LIPIDS - LAST 2   Lab Results   Component Value Date    CHOL 224 (H) 11/08/2019    CHOL 152 08/01/2006    HDL 56 11/08/2019    HDL 57.0 08/01/2006    LDLCALC 145.0 11/08/2019    LDLCALC 85.0 08/01/2006    TRIG 115 11/08/2019    TRIG 50 08/01/2006    CHOLHDL 25.0 11/08/2019    CHOLHDL 37.5 08/01/2006       CBC - LAST 2  Lab Results   Component Value Date    WBC 7.52 11/07/2019    WBC 7.0 08/01/2012    RBC 4.09 11/07/2019    RBC 4.05 (L) 08/01/2012    HGB 12.8 11/07/2019    HGB 12.7 08/01/2012    HCT 39.2 11/07/2019    HCT 38.5 08/01/2012    MCV 96 11/07/2019    MCV 95.1 08/01/2012    MCH 31.3 (H) 11/07/2019    MCH 31.3 (H) 08/01/2012    MCHC 32.7 11/07/2019    MCHC 32.9 (L) 08/01/2012    RDW 12.8 11/07/2019    RDW 13.6 08/01/2012     11/07/2019     08/01/2012    MPV 9.5 11/07/2019    MPV 7.8 08/01/2012    GRAN 4.9 11/07/2019    GRAN 64.5 11/07/2019    LYMPH 1.9 11/07/2019    LYMPH 25.3 11/07/2019    MONO 0.6 11/07/2019    MONO 8.4 11/07/2019    BASO 0.03 11/07/2019    BASO 0.0 08/01/2006    NRBC 0 11/07/2019       CHEMISTRY & LIVER FUNCTION - LAST 2  Lab Results   Component Value Date    NA  138 11/08/2019     11/07/2019    K 3.8 11/08/2019    K 3.7 11/07/2019     11/08/2019     11/07/2019    CO2 28 11/08/2019    CO2 27 11/07/2019    ANIONGAP 9 11/08/2019    ANIONGAP 10 11/07/2019    BUN 21 11/08/2019    BUN 14 11/07/2019    CREATININE 1.0 11/08/2019    CREATININE 1.0 11/07/2019     (H) 11/08/2019     (H) 11/07/2019    CALCIUM 9.3 11/08/2019    CALCIUM 9.6 11/07/2019    MG 1.8 11/08/2019    ALBUMIN 3.9 11/07/2019    ALBUMIN 4.3 08/01/2012    PROT 7.5 11/07/2019    PROT 6.9 08/01/2012    ALKPHOS 65 11/07/2019    ALKPHOS 56 08/01/2012    ALT 19 11/07/2019    ALT 21 08/01/2012    AST 23 11/07/2019    AST 25 08/01/2012    BILITOT 1.1 (H) 11/07/2019    BILITOT 0.7 08/01/2006        CARDIAC PROFILE - LAST 2  Lab Results   Component Value Date    BNP 16 11/07/2019    TROPONINI <0.030 11/08/2019    TROPONINI <0.030 11/07/2019        COAGULATION - LAST 2  Lab Results   Component Value Date    LABPT 13.8 11/07/2019    INR 1.1 11/07/2019       ENDOCRINE & PSA - LAST 2  Lab Results   Component Value Date    HGBA1C 5.9 11/08/2019        ECHOCARDIOGRAM RESULTS  No results found for this or any previous visit.    CURRENT/PREVIOUS VISIT EKG  Results for orders placed or performed during the hospital encounter of 11/07/19   EKG 12-lead    Collection Time: 11/08/19  2:35 AM    Narrative    Test Reason :     Vent. Rate : 057 BPM     Atrial Rate : 057 BPM     P-R Int : 158 ms          QRS Dur : 124 ms      QT Int : 456 ms       P-R-T Axes : 056 061 038 degrees     QTc Int : 443 ms    Sinus bradycardia  Right bundle branch block  Abnormal ECG  When compared with ECG of 07-NOV-2019 12:21,  No significant change was found  Confirmed by Celina CRUZ, Norbert ([id number]) on 11/10/2019 6:01:40 PM    Referred By: JENNA   SELF           Confirmed By:Norbert Patrick MD         PHYSICAL EXAM  CONSTITUTIONAL: Well built, well nourished in no apparent distress  NECK: no carotid bruit, no JVD  LUNGS: CTA  CHEST  WALL: no tenderness  HEART: regular rate and rhythm, S1, S2 normal, no murmur, click, rub or gallop   ABDOMEN: soft, non-tender; bowel sounds normal; no masses,  no organomegaly  EXTREMITIES: Extremities normal, no edema, no calf tenderness noted  NEURO: AAO X 3    I HAVE REVIEWED :    The vital signs, nurses notes, and all the pertinent radiology and labs.         Current Outpatient Medications   Medication Instructions    amLODIPine (NORVASC) 5 mg, Daily    aspirin 81 mg, Oral, Daily    atorvastatin (LIPITOR) 40 mg, Oral, Nightly    B-complex with vitamin C tablet 1 tablet, Oral, Daily    cetirizine (ZYRTEC) 10 mg, Oral, Daily PRN    co-enzyme Q-10 30 mg, Oral, Daily    escitalopram oxalate (LEXAPRO) 20 MG tablet No dose, route, or frequency recorded.    FLUoxetine 20 mg, Oral, Daily    fluticasone (FLONASE) 50 mcg/actuation nasal spray 1 spray, Each Nostril, Daily PRN    ipratropium (ATROVENT) 0.03 % nasal spray 2 sprays, Nasal, Daily PRN    Lactobacillus rhamnosus GG (CULTURELLE) 10 billion cell capsule 1 capsule, Oral, Daily    levomefolate calcium (DEPLIN) 15 mg, Oral, Daily    loratadine (CLARITIN) 10 mg, Oral, Daily    losartan-hydrochlorothiazide 100-25 mg (HYZAAR) 100-25 mg per tablet 1 tablet, Oral, Daily    magnesium 30 mg, Oral, Daily    meloxicam (MOBIC) 15 mg, Oral, Daily    mv-mn/folic acid/vit [id number] (ALIVE ONCE DAILY WOMEN 50 PLUS ORAL) 1 tablet, Oral, Daily    naproxen sodium (ANAPROX) 220 mg, Oral, Daily PRN    potassium chloride SA (K-DUR,KLOR-CON) 10 MEQ tablet 10 mEq, Oral, 2 times daily    terazosin (HYTRIN) 5 mg, Oral, Nightly    TIROSINT 88 mcg, Oral, Daily          Assessment:       1.   Dyslipidemia  2.  Statin intolerance  3.  Essential hypertension  4.  edema in both lower extremities- IMPROVED  5.  Thyroid dysfunction  6.  Aortic valve stenosis of mild degree with a valve area 1.6 cm  7. Weight loss  8. Thyroid Dysfunciton        Plan:       Continue current  regimen she is doing well with this  Blood pressure is controlled at home   She has been doing more activity and denies symptoms  Will see her back in 6 months time or sooner for problems or concerns.   Consider repeat echo at that time  PCP to do labs and we will obtain a copy    Follow up in about 6 months (around 4/23/2021).

## 2021-01-07 RX ORDER — POTASSIUM CHLORIDE 750 MG/1
10 TABLET, EXTENDED RELEASE ORAL 2 TIMES DAILY
Qty: 180 TABLET | Refills: 3 | Status: SHIPPED | OUTPATIENT
Start: 2021-01-07 | End: 2023-03-14

## 2021-03-18 ENCOUNTER — TELEPHONE (OUTPATIENT)
Dept: CARDIOLOGY | Facility: CLINIC | Age: 76
End: 2021-03-18

## 2021-03-18 DIAGNOSIS — U07.1 COVID-19: Primary | ICD-10-CM

## 2021-03-24 ENCOUNTER — HOSPITAL ENCOUNTER (EMERGENCY)
Facility: HOSPITAL | Age: 76
Discharge: HOME | End: 2021-03-25
Attending: EMERGENCY MEDICINE
Payer: MEDICARE

## 2021-03-24 DIAGNOSIS — Z20.822 SUSPECTED COVID-19 VIRUS INFECTION: ICD-10-CM

## 2021-03-24 DIAGNOSIS — U07.1 COVID-19 VIRUS INFECTION: Primary | ICD-10-CM

## 2021-03-24 LAB
ALBUMIN SERPL BCP-MCNC: 3.1 G/DL (ref 3.5–5.2)
ALP SERPL-CCNC: 101 U/L (ref 55–135)
ALT SERPL W/O P-5'-P-CCNC: 33 U/L (ref 10–44)
ANION GAP SERPL CALC-SCNC: 9 MMOL/L (ref 8–16)
AST SERPL-CCNC: 27 U/L (ref 10–40)
BASOPHILS # BLD AUTO: 0.05 K/UL (ref 0–0.2)
BASOPHILS NFR BLD: 0.6 % (ref 0–1.9)
BILIRUB SERPL-MCNC: 0.5 MG/DL (ref 0.1–1)
BNP SERPL-MCNC: 34 PG/ML (ref 0–99)
BUN SERPL-MCNC: 16 MG/DL (ref 8–23)
CALCIUM SERPL-MCNC: 9.3 MG/DL (ref 8.7–10.5)
CHLORIDE SERPL-SCNC: 103 MMOL/L (ref 95–110)
CK SERPL-CCNC: 68 U/L (ref 20–180)
CO2 SERPL-SCNC: 29 MMOL/L (ref 23–29)
CREAT SERPL-MCNC: 0.9 MG/DL (ref 0.5–1.4)
CRP SERPL-MCNC: 1.8 MG/DL
DIFFERENTIAL METHOD: ABNORMAL
EOSINOPHIL # BLD AUTO: 0.2 K/UL (ref 0–0.5)
EOSINOPHIL NFR BLD: 2.1 % (ref 0–8)
ERYTHROCYTE [DISTWIDTH] IN BLOOD BY AUTOMATED COUNT: 12.7 % (ref 11.5–14.5)
EST. GFR  (AFRICAN AMERICAN): >60 ML/MIN/1.73 M^2
EST. GFR  (NON AFRICAN AMERICAN): >60 ML/MIN/1.73 M^2
FERRITIN SERPL-MCNC: 239 NG/ML (ref 20–300)
GLUCOSE SERPL-MCNC: 127 MG/DL (ref 70–110)
HCT VFR BLD AUTO: 38.1 % (ref 37–48.5)
HGB BLD-MCNC: 12.1 G/DL (ref 12–16)
IMM GRANULOCYTES # BLD AUTO: 0.03 K/UL (ref 0–0.04)
IMM GRANULOCYTES NFR BLD AUTO: 0.4 % (ref 0–0.5)
LACTATE SERPL-SCNC: 1.3 MMOL/L (ref 0.5–1.9)
LDH SERPL L TO P-CCNC: 221 U/L (ref 110–260)
LYMPHOCYTES # BLD AUTO: 2.2 K/UL (ref 1–4.8)
LYMPHOCYTES NFR BLD: 27.4 % (ref 18–48)
MCH RBC QN AUTO: 30.5 PG (ref 27–31)
MCHC RBC AUTO-ENTMCNC: 31.8 G/DL (ref 32–36)
MCV RBC AUTO: 96 FL (ref 82–98)
MONOCYTES # BLD AUTO: 0.8 K/UL (ref 0.3–1)
MONOCYTES NFR BLD: 9.5 % (ref 4–15)
NEUTROPHILS # BLD AUTO: 4.9 K/UL (ref 1.8–7.7)
NEUTROPHILS NFR BLD: 60 % (ref 38–73)
NRBC BLD-RTO: 0 /100 WBC
PLATELET # BLD AUTO: 463 K/UL (ref 150–350)
PMV BLD AUTO: 9.5 FL (ref 9.2–12.9)
POTASSIUM SERPL-SCNC: 3.9 MMOL/L (ref 3.5–5.1)
PROCALCITONIN SERPL IA-MCNC: <0.05 NG/ML (ref 0–0.5)
PROT SERPL-MCNC: 7.7 G/DL (ref 6–8.4)
RBC # BLD AUTO: 3.97 M/UL (ref 4–5.4)
SARS-COV-2 RDRP RESP QL NAA+PROBE: NEGATIVE
SODIUM SERPL-SCNC: 141 MMOL/L (ref 136–145)
TROPONIN I SERPL DL<=0.01 NG/ML-MCNC: <0.03 NG/ML
WBC # BLD AUTO: 8.18 K/UL (ref 3.9–12.7)

## 2021-03-24 PROCEDURE — 84484 ASSAY OF TROPONIN QUANT: CPT | Performed by: EMERGENCY MEDICINE

## 2021-03-24 PROCEDURE — U0002 COVID-19 LAB TEST NON-CDC: HCPCS | Performed by: EMERGENCY MEDICINE

## 2021-03-24 PROCEDURE — 99285 EMERGENCY DEPT VISIT HI MDM: CPT | Mod: 25

## 2021-03-24 PROCEDURE — 83615 LACTATE (LD) (LDH) ENZYME: CPT | Performed by: EMERGENCY MEDICINE

## 2021-03-24 PROCEDURE — 96361 HYDRATE IV INFUSION ADD-ON: CPT

## 2021-03-24 PROCEDURE — 82728 ASSAY OF FERRITIN: CPT | Performed by: EMERGENCY MEDICINE

## 2021-03-24 PROCEDURE — 82550 ASSAY OF CK (CPK): CPT | Performed by: EMERGENCY MEDICINE

## 2021-03-24 PROCEDURE — 93010 EKG 12-LEAD: ICD-10-PCS | Mod: ,,, | Performed by: SPECIALIST

## 2021-03-24 PROCEDURE — 80053 COMPREHEN METABOLIC PANEL: CPT | Performed by: EMERGENCY MEDICINE

## 2021-03-24 PROCEDURE — 93010 ELECTROCARDIOGRAM REPORT: CPT | Mod: ,,, | Performed by: SPECIALIST

## 2021-03-24 PROCEDURE — 83880 ASSAY OF NATRIURETIC PEPTIDE: CPT | Performed by: EMERGENCY MEDICINE

## 2021-03-24 PROCEDURE — 86140 C-REACTIVE PROTEIN: CPT | Performed by: EMERGENCY MEDICINE

## 2021-03-24 PROCEDURE — 84145 PROCALCITONIN (PCT): CPT | Performed by: EMERGENCY MEDICINE

## 2021-03-24 PROCEDURE — 96360 HYDRATION IV INFUSION INIT: CPT | Mod: 59

## 2021-03-24 PROCEDURE — 83605 ASSAY OF LACTIC ACID: CPT | Performed by: EMERGENCY MEDICINE

## 2021-03-24 PROCEDURE — 85025 COMPLETE CBC W/AUTO DIFF WBC: CPT | Performed by: EMERGENCY MEDICINE

## 2021-03-24 PROCEDURE — 25000003 PHARM REV CODE 250: Performed by: EMERGENCY MEDICINE

## 2021-03-24 PROCEDURE — 36415 COLL VENOUS BLD VENIPUNCTURE: CPT | Performed by: EMERGENCY MEDICINE

## 2021-03-24 PROCEDURE — 93005 ELECTROCARDIOGRAM TRACING: CPT | Performed by: SPECIALIST

## 2021-03-24 PROCEDURE — 87040 BLOOD CULTURE FOR BACTERIA: CPT | Performed by: EMERGENCY MEDICINE

## 2021-03-24 RX ORDER — SODIUM CHLORIDE 9 MG/ML
INJECTION, SOLUTION INTRAVENOUS
Status: COMPLETED | OUTPATIENT
Start: 2021-03-24 | End: 2021-03-24

## 2021-03-24 RX ORDER — ALBUTEROL SULFATE 90 UG/1
2 AEROSOL, METERED RESPIRATORY (INHALATION) EVERY 4 HOURS PRN
COMMUNITY
End: 2023-11-09 | Stop reason: SDUPTHER

## 2021-03-24 RX ADMIN — SODIUM CHLORIDE: 0.9 INJECTION, SOLUTION INTRAVENOUS at 08:03

## 2021-03-25 VITALS
TEMPERATURE: 98 F | OXYGEN SATURATION: 98 % | SYSTOLIC BLOOD PRESSURE: 147 MMHG | HEIGHT: 61 IN | RESPIRATION RATE: 20 BRPM | DIASTOLIC BLOOD PRESSURE: 67 MMHG | BODY MASS INDEX: 32.66 KG/M2 | WEIGHT: 173 LBS | HEART RATE: 73 BPM

## 2021-03-25 RX ORDER — AZITHROMYCIN 250 MG/1
250 TABLET, FILM COATED ORAL DAILY
Qty: 6 TABLET | Refills: 0 | Status: SHIPPED | OUTPATIENT
Start: 2021-03-25 | End: 2021-04-22

## 2021-03-29 LAB
BACTERIA BLD CULT: NORMAL
BACTERIA BLD CULT: NORMAL

## 2021-03-30 ENCOUNTER — TELEPHONE (OUTPATIENT)
Dept: CARDIOLOGY | Facility: CLINIC | Age: 76
End: 2021-03-30

## 2021-04-22 ENCOUNTER — OFFICE VISIT (OUTPATIENT)
Dept: CARDIOLOGY | Facility: CLINIC | Age: 76
End: 2021-04-22
Payer: MEDICARE

## 2021-04-22 VITALS
DIASTOLIC BLOOD PRESSURE: 60 MMHG | HEART RATE: 69 BPM | BODY MASS INDEX: 32.66 KG/M2 | HEIGHT: 61 IN | RESPIRATION RATE: 16 BRPM | WEIGHT: 173 LBS | SYSTOLIC BLOOD PRESSURE: 148 MMHG | OXYGEN SATURATION: 98 %

## 2021-04-22 DIAGNOSIS — Z78.9 STATIN INTOLERANCE: ICD-10-CM

## 2021-04-22 DIAGNOSIS — I10 ESSENTIAL HYPERTENSION: Primary | ICD-10-CM

## 2021-04-22 DIAGNOSIS — U07.1 RESPIRATORY TRACT INFECTION DUE TO COVID-19 VIRUS: ICD-10-CM

## 2021-04-22 DIAGNOSIS — K21.00 GASTROESOPHAGEAL REFLUX DISEASE WITH ESOPHAGITIS WITHOUT HEMORRHAGE: ICD-10-CM

## 2021-04-22 DIAGNOSIS — E78.00 HYPERCHOLESTEREMIA: ICD-10-CM

## 2021-04-22 DIAGNOSIS — J98.8 RESPIRATORY TRACT INFECTION DUE TO COVID-19 VIRUS: ICD-10-CM

## 2021-04-22 PROCEDURE — 99214 OFFICE O/P EST MOD 30 MIN: CPT | Mod: CR,S$GLB,, | Performed by: INTERNAL MEDICINE

## 2021-04-22 PROCEDURE — 99214 PR OFFICE/OUTPT VISIT, EST, LEVL IV, 30-39 MIN: ICD-10-PCS | Mod: CR,S$GLB,, | Performed by: INTERNAL MEDICINE

## 2021-05-06 ENCOUNTER — TELEPHONE (OUTPATIENT)
Dept: CARDIOLOGY | Facility: CLINIC | Age: 76
End: 2021-05-06

## 2021-06-21 RX ORDER — LOSARTAN POTASSIUM AND HYDROCHLOROTHIAZIDE 25; 100 MG/1; MG/1
1 TABLET ORAL DAILY
Qty: 90 TABLET | Refills: 3 | Status: SHIPPED | OUTPATIENT
Start: 2021-06-21 | End: 2022-03-29 | Stop reason: SDUPTHER

## 2021-09-17 ENCOUNTER — TELEPHONE (OUTPATIENT)
Dept: CARDIOLOGY | Facility: CLINIC | Age: 76
End: 2021-09-17

## 2021-09-20 ENCOUNTER — HOSPITAL ENCOUNTER (EMERGENCY)
Facility: HOSPITAL | Age: 76
Discharge: HOME | End: 2021-09-20
Attending: EMERGENCY MEDICINE
Payer: MEDICARE

## 2021-09-20 VITALS
OXYGEN SATURATION: 98 % | HEART RATE: 60 BPM | RESPIRATION RATE: 16 BRPM | SYSTOLIC BLOOD PRESSURE: 142 MMHG | BODY MASS INDEX: 32.88 KG/M2 | WEIGHT: 174 LBS | TEMPERATURE: 98 F | DIASTOLIC BLOOD PRESSURE: 63 MMHG

## 2021-09-20 DIAGNOSIS — R07.9 CHEST PAIN: ICD-10-CM

## 2021-09-20 DIAGNOSIS — S39.012A BACK STRAIN, INITIAL ENCOUNTER: Primary | ICD-10-CM

## 2021-09-20 LAB
ALBUMIN SERPL-MCNC: 3.6 G/DL (ref 3.3–5.5)
ALP SERPL-CCNC: 73 U/L (ref 42–141)
BILIRUB SERPL-MCNC: 0.9 MG/DL (ref 0.2–1.6)
BUN SERPL-MCNC: 16 MG/DL (ref 7–22)
CALCIUM SERPL-MCNC: 10.1 MG/DL (ref 8–10.3)
CHLORIDE SERPL-SCNC: 104 MMOL/L (ref 98–108)
CREAT SERPL-MCNC: 1 MG/DL (ref 0.6–1.2)
GLUCOSE SERPL-MCNC: 111 MG/DL (ref 73–118)
POC ALT (SGPT): 27 U/L (ref 10–47)
POC AST (SGOT): 39 U/L (ref 11–38)
POC CARDIAC TROPONIN I: 0 NG/ML
POC PTINR: 1.2 (ref 0.9–1.2)
POC PTWBT: 13.8 SEC (ref 9.7–14.3)
POC TCO2: 27 MMOL/L (ref 18–33)
POTASSIUM BLD-SCNC: 3.7 MMOL/L (ref 3.6–5.1)
PROTEIN, POC: 7.2 G/DL (ref 6.4–8.1)
SAMPLE: NORMAL
SAMPLE: NORMAL
SODIUM BLD-SCNC: 140 MMOL/L (ref 128–145)

## 2021-09-20 PROCEDURE — 25000003 PHARM REV CODE 250: Mod: ER | Performed by: NURSE PRACTITIONER

## 2021-09-20 PROCEDURE — 63600175 PHARM REV CODE 636 W HCPCS: Mod: ER | Performed by: NURSE PRACTITIONER

## 2021-09-20 PROCEDURE — 85025 COMPLETE CBC W/AUTO DIFF WBC: CPT | Mod: ER

## 2021-09-20 PROCEDURE — 93005 ELECTROCARDIOGRAM TRACING: CPT | Mod: ER

## 2021-09-20 PROCEDURE — 93010 EKG 12-LEAD: ICD-10-PCS | Mod: ,,, | Performed by: INTERNAL MEDICINE

## 2021-09-20 PROCEDURE — 96374 THER/PROPH/DIAG INJ IV PUSH: CPT | Mod: ER

## 2021-09-20 PROCEDURE — 99285 EMERGENCY DEPT VISIT HI MDM: CPT | Mod: 25,ER

## 2021-09-20 PROCEDURE — 85610 PROTHROMBIN TIME: CPT | Mod: ER

## 2021-09-20 PROCEDURE — 84484 ASSAY OF TROPONIN QUANT: CPT | Mod: ER

## 2021-09-20 PROCEDURE — 80053 COMPREHEN METABOLIC PANEL: CPT | Mod: ER

## 2021-09-20 PROCEDURE — 93010 ELECTROCARDIOGRAM REPORT: CPT | Mod: ,,, | Performed by: INTERNAL MEDICINE

## 2021-09-20 RX ORDER — CYCLOBENZAPRINE HCL 10 MG
10 TABLET ORAL 3 TIMES DAILY PRN
Qty: 15 TABLET | Refills: 0 | Status: SHIPPED | OUTPATIENT
Start: 2021-09-20 | End: 2021-09-25

## 2021-09-20 RX ORDER — ASPIRIN 325 MG
325 TABLET ORAL
Status: DISCONTINUED | OUTPATIENT
Start: 2021-09-20 | End: 2021-09-20

## 2021-09-20 RX ORDER — KETOROLAC TROMETHAMINE 30 MG/ML
15 INJECTION, SOLUTION INTRAMUSCULAR; INTRAVENOUS
Status: COMPLETED | OUTPATIENT
Start: 2021-09-20 | End: 2021-09-20

## 2021-09-20 RX ORDER — LIDOCAINE 50 MG/G
1 PATCH TOPICAL
Status: DISCONTINUED | OUTPATIENT
Start: 2021-09-20 | End: 2021-09-20 | Stop reason: HOSPADM

## 2021-09-20 RX ORDER — SULINDAC 150 MG/1
150 TABLET ORAL 2 TIMES DAILY
Qty: 10 TABLET | Refills: 0 | Status: SHIPPED | OUTPATIENT
Start: 2021-09-20 | End: 2021-11-08

## 2021-09-20 RX ADMIN — LIDOCAINE 1 PATCH: 50 PATCH TOPICAL at 03:09

## 2021-09-20 RX ADMIN — KETOROLAC TROMETHAMINE 15 MG: 30 INJECTION, SOLUTION INTRAMUSCULAR at 12:09

## 2021-09-21 ENCOUNTER — PATIENT MESSAGE (OUTPATIENT)
Dept: CARDIOLOGY | Facility: CLINIC | Age: 76
End: 2021-09-21

## 2021-09-21 ENCOUNTER — OFFICE VISIT (OUTPATIENT)
Dept: CARDIOLOGY | Facility: CLINIC | Age: 76
End: 2021-09-21
Payer: MEDICARE

## 2021-09-21 VITALS
HEIGHT: 61 IN | SYSTOLIC BLOOD PRESSURE: 142 MMHG | HEART RATE: 60 BPM | DIASTOLIC BLOOD PRESSURE: 80 MMHG | WEIGHT: 175 LBS | BODY MASS INDEX: 33.04 KG/M2

## 2021-09-21 DIAGNOSIS — U07.1 RESPIRATORY TRACT INFECTION DUE TO COVID-19 VIRUS: ICD-10-CM

## 2021-09-21 DIAGNOSIS — I10 ESSENTIAL HYPERTENSION: ICD-10-CM

## 2021-09-21 DIAGNOSIS — R07.89 ATYPICAL CHEST PAIN: ICD-10-CM

## 2021-09-21 DIAGNOSIS — G47.33 OBSTRUCTIVE SLEEP APNEA ON CPAP: ICD-10-CM

## 2021-09-21 DIAGNOSIS — G54.2 CERVICAL NEUROPATHY: ICD-10-CM

## 2021-09-21 DIAGNOSIS — J98.8 RESPIRATORY TRACT INFECTION DUE TO COVID-19 VIRUS: ICD-10-CM

## 2021-09-21 PROCEDURE — 99214 PR OFFICE/OUTPT VISIT, EST, LEVL IV, 30-39 MIN: ICD-10-PCS | Mod: CR,S$GLB,, | Performed by: INTERNAL MEDICINE

## 2021-09-21 PROCEDURE — 99214 OFFICE O/P EST MOD 30 MIN: CPT | Mod: CR,S$GLB,, | Performed by: INTERNAL MEDICINE

## 2021-10-26 ENCOUNTER — HOSPITAL ENCOUNTER (OUTPATIENT)
Dept: RADIOLOGY | Facility: CLINIC | Age: 76
Discharge: HOME | End: 2021-10-26
Attending: INTERNAL MEDICINE
Payer: MEDICARE

## 2021-10-26 ENCOUNTER — HOSPITAL ENCOUNTER (OUTPATIENT)
Dept: CARDIOLOGY | Facility: CLINIC | Age: 76
Discharge: HOME | End: 2021-10-26
Attending: INTERNAL MEDICINE
Payer: MEDICARE

## 2021-10-26 VITALS — BODY MASS INDEX: 33.04 KG/M2 | WEIGHT: 175 LBS | HEIGHT: 61 IN

## 2021-10-26 DIAGNOSIS — G54.2 CERVICAL NEUROPATHY: ICD-10-CM

## 2021-10-26 DIAGNOSIS — J98.8 RESPIRATORY TRACT INFECTION DUE TO COVID-19 VIRUS: ICD-10-CM

## 2021-10-26 DIAGNOSIS — I10 ESSENTIAL HYPERTENSION: ICD-10-CM

## 2021-10-26 DIAGNOSIS — U07.1 RESPIRATORY TRACT INFECTION DUE TO COVID-19 VIRUS: ICD-10-CM

## 2021-10-26 DIAGNOSIS — R07.89 ATYPICAL CHEST PAIN: ICD-10-CM

## 2021-10-26 PROCEDURE — 78452 STRESS TEST WITH MYOCARDIAL PERFUSION (CUPID ONLY): ICD-10-PCS | Mod: CR,S$GLB,, | Performed by: INTERNAL MEDICINE

## 2021-10-26 PROCEDURE — A9502 TC99M TETROFOSMIN: HCPCS | Mod: CR,S$GLB,, | Performed by: INTERNAL MEDICINE

## 2021-10-26 PROCEDURE — 93306 TTE W/DOPPLER COMPLETE: CPT | Mod: CR,S$GLB,, | Performed by: INTERNAL MEDICINE

## 2021-10-26 PROCEDURE — 93015 CV STRESS TEST SUPVJ I&R: CPT | Mod: CR,S$GLB,, | Performed by: INTERNAL MEDICINE

## 2021-10-26 PROCEDURE — 93015 STRESS TEST WITH MYOCARDIAL PERFUSION (CUPID ONLY): ICD-10-PCS | Mod: CR,S$GLB,, | Performed by: INTERNAL MEDICINE

## 2021-10-26 PROCEDURE — A9502 STRESS TEST WITH MYOCARDIAL PERFUSION (CUPID ONLY): ICD-10-PCS | Mod: CR,S$GLB,, | Performed by: INTERNAL MEDICINE

## 2021-10-26 PROCEDURE — 78452 HT MUSCLE IMAGE SPECT MULT: CPT | Mod: CR,S$GLB,, | Performed by: INTERNAL MEDICINE

## 2021-10-26 PROCEDURE — 93306 ECHO (CUPID ONLY): ICD-10-PCS | Mod: CR,S$GLB,, | Performed by: INTERNAL MEDICINE

## 2021-10-26 RX ORDER — REGADENOSON 0.08 MG/ML
0.4 INJECTION, SOLUTION INTRAVENOUS ONCE
Status: COMPLETED | OUTPATIENT
Start: 2021-10-26 | End: 2021-10-26

## 2021-10-26 RX ADMIN — REGADENOSON 0.4 MG: 0.08 INJECTION, SOLUTION INTRAVENOUS at 08:10

## 2021-10-27 LAB
CV PHARM DOSE: 0.4 MG
CV STRESS BASE HR: 62 BPM
DIASTOLIC BLOOD PRESSURE: 82 MMHG
EJECTION FRACTION- HIGH: 65 %
END DIASTOLIC INDEX-HIGH: 158 ML/M2
END DIASTOLIC INDEX-LOW: 94 ML/M2
END SYSTOLIC INDEX-HIGH: 71 ML/M2
END SYSTOLIC INDEX-LOW: 33 ML/M2
NUC STRESS DIASTOLIC VOLUME INDEX: 65
NUC STRESS EJECTION FRACTION: 80 %
NUC STRESS SYSTOLIC VOLUME INDEX: 13
OHS CV CPX 1 MINUTE RECOVERY HEART RATE: 81 BPM
OHS CV CPX 85 PERCENT MAX PREDICTED HEART RATE MALE: 119
OHS CV CPX MAX PREDICTED HEART RATE: 140
OHS CV CPX PATIENT IS FEMALE: 1
OHS CV CPX PATIENT IS MALE: 0
OHS CV CPX PEAK DIASTOLIC BLOOD PRESSURE: 66 MMHG
OHS CV CPX PEAK HEAR RATE: 85 BPM
OHS CV CPX PEAK RATE PRESSURE PRODUCT: NORMAL
OHS CV CPX PEAK SYSTOLIC BLOOD PRESSURE: 150 MMHG
OHS CV CPX PERCENT MAX PREDICTED HEART RATE ACHIEVED: 61
OHS CV CPX RATE PRESSURE PRODUCT PRESENTING: NORMAL
RETIRED EF AND QEF - SEE NOTES: 53 %
SYSTOLIC BLOOD PRESSURE: 170 MMHG

## 2021-11-02 LAB
AORTIC ROOT ANNULUS: 2.5 CM
AORTIC VALVE CUSP SEPERATION: 1.3 CM
AV INDEX (PROSTH): 0.57
AV MEAN GRADIENT: 8 MMHG
AV PEAK GRADIENT: 14 MMHG
AV VALVE AREA: 1.78 CM2
AV VELOCITY RATIO: 0.59
BSA FOR ECHO PROCEDURE: 1.85 M2
CV ECHO LV RWT: 0.46 CM
DOP CALC AO PEAK VEL: 1.87 M/S
DOP CALC AO VTI: 55.2 CM
DOP CALC LVOT AREA: 3.1 CM2
DOP CALC LVOT DIAMETER: 2 CM
DOP CALC LVOT PEAK VEL: 1.11 M/S
DOP CALC LVOT STROKE VOLUME: 98.28 CM3
DOP CALCLVOT PEAK VEL VTI: 31.3 CM
E WAVE DECELERATION TIME: 296 MS
E/A RATIO: 1.13
E/E' RATIO: 13.38 M/S
ECHO LV POSTERIOR WALL: 1.1 CM (ref 0.6–1.1)
EJECTION FRACTION: 78 %
FRACTIONAL SHORTENING: 47 % (ref 28–44)
INTERVENTRICULAR SEPTUM: 1.09 CM (ref 0.6–1.1)
IVRT: 63 MS
LA MAJOR: 3.7 CM
LEFT ATRIUM SIZE: 3.7 CM
LEFT INTERNAL DIMENSION IN SYSTOLE: 2.52 CM (ref 2.1–4)
LEFT VENTRICLE MASS INDEX: 107 G/M2
LEFT VENTRICULAR INTERNAL DIMENSION IN DIASTOLE: 4.76 CM (ref 3.5–6)
LEFT VENTRICULAR MASS: 190.17 G
LV LATERAL E/E' RATIO: 13.38 M/S
LV SEPTAL E/E' RATIO: 13.38 M/S
MV PEAK A VEL: 0.95 M/S
MV PEAK E VEL: 1.07 M/S
PISA TR MAX VEL: 2.5 M/S
RA PRESSURE: 3 MMHG
RIGHT VENTRICULAR END-DIASTOLIC DIMENSION: 2.32 CM
TDI LATERAL: 0.08 M/S
TDI SEPTAL: 0.08 M/S
TDI: 0.08 M/S
TR MAX PG: 25 MMHG
TV REST PULMONARY ARTERY PRESSURE: 28 MMHG

## 2021-11-04 ENCOUNTER — PATIENT MESSAGE (OUTPATIENT)
Dept: ADMINISTRATIVE | Facility: OTHER | Age: 76
End: 2021-11-04
Payer: MEDICARE

## 2021-11-04 ENCOUNTER — OFFICE VISIT (OUTPATIENT)
Dept: CARDIOLOGY | Facility: CLINIC | Age: 76
End: 2021-11-04
Payer: MEDICARE

## 2021-11-04 VITALS
WEIGHT: 178 LBS | HEART RATE: 72 BPM | SYSTOLIC BLOOD PRESSURE: 160 MMHG | HEIGHT: 61 IN | DIASTOLIC BLOOD PRESSURE: 70 MMHG | BODY MASS INDEX: 33.61 KG/M2

## 2021-11-04 DIAGNOSIS — E03.2 HYPOTHYROIDISM DUE TO MEDICATION: ICD-10-CM

## 2021-11-04 DIAGNOSIS — R73.9 HYPERGLYCEMIA: ICD-10-CM

## 2021-11-04 DIAGNOSIS — E78.00 HYPERCHOLESTEREMIA: ICD-10-CM

## 2021-11-04 DIAGNOSIS — E07.9 THYROID DYSFUNCTION: ICD-10-CM

## 2021-11-04 DIAGNOSIS — R07.89 ATYPICAL CHEST PAIN: ICD-10-CM

## 2021-11-04 DIAGNOSIS — G47.33 OBSTRUCTIVE SLEEP APNEA ON CPAP: ICD-10-CM

## 2021-11-04 DIAGNOSIS — I20.89 STABLE ANGINA: Primary | ICD-10-CM

## 2021-11-04 DIAGNOSIS — Z78.9 STATIN INTOLERANCE: ICD-10-CM

## 2021-11-04 DIAGNOSIS — I10 ESSENTIAL HYPERTENSION: ICD-10-CM

## 2021-11-04 PROCEDURE — 99214 PR OFFICE/OUTPT VISIT, EST, LEVL IV, 30-39 MIN: ICD-10-PCS | Mod: S$GLB,,, | Performed by: NURSE PRACTITIONER

## 2021-11-04 PROCEDURE — 99214 OFFICE O/P EST MOD 30 MIN: CPT | Mod: S$GLB,,, | Performed by: NURSE PRACTITIONER

## 2021-11-04 RX ORDER — ISOSORBIDE MONONITRATE 30 MG/1
30 TABLET, EXTENDED RELEASE ORAL DAILY
Qty: 30 TABLET | Refills: 11 | Status: SHIPPED | OUTPATIENT
Start: 2021-11-04 | End: 2022-09-09

## 2021-11-04 RX ORDER — TERAZOSIN 2 MG/1
2 CAPSULE ORAL NIGHTLY
Qty: 30 CAPSULE | Refills: 11 | Status: SHIPPED | OUTPATIENT
Start: 2021-11-04 | End: 2022-02-10 | Stop reason: SDUPTHER

## 2021-11-08 ENCOUNTER — TELEPHONE (OUTPATIENT)
Dept: RADIOLOGY | Facility: HOSPITAL | Age: 76
End: 2021-11-08
Payer: MEDICARE

## 2021-11-08 DIAGNOSIS — Z01.812 PRE-PROCEDURE LAB EXAM: Primary | ICD-10-CM

## 2021-11-10 ENCOUNTER — HOSPITAL ENCOUNTER (OUTPATIENT)
Dept: RADIOLOGY | Facility: HOSPITAL | Age: 76
Discharge: HOME | End: 2021-11-10
Attending: NURSE PRACTITIONER
Payer: MEDICARE

## 2021-11-10 VITALS
OXYGEN SATURATION: 97 % | HEART RATE: 62 BPM | RESPIRATION RATE: 20 BRPM | DIASTOLIC BLOOD PRESSURE: 59 MMHG | SYSTOLIC BLOOD PRESSURE: 137 MMHG

## 2021-11-10 DIAGNOSIS — E07.9 THYROID DYSFUNCTION: ICD-10-CM

## 2021-11-10 DIAGNOSIS — R73.9 HYPERGLYCEMIA: ICD-10-CM

## 2021-11-10 DIAGNOSIS — I20.89 STABLE ANGINA: ICD-10-CM

## 2021-11-10 PROCEDURE — 75574 CT ANGIO HRT W/3D IMAGE: CPT | Mod: TC

## 2021-11-10 PROCEDURE — 25000003 PHARM REV CODE 250: Performed by: NURSE PRACTITIONER

## 2021-11-10 PROCEDURE — 25500020 PHARM REV CODE 255: Performed by: NURSE PRACTITIONER

## 2021-11-10 RX ORDER — METOPROLOL TARTRATE 1 MG/ML
2.5 INJECTION, SOLUTION INTRAVENOUS ONCE
Status: COMPLETED | OUTPATIENT
Start: 2021-11-10 | End: 2021-11-10

## 2021-11-10 RX ORDER — NITROGLYCERIN 400 UG/1
1 SPRAY ORAL ONCE
Status: COMPLETED | OUTPATIENT
Start: 2021-11-10 | End: 2021-11-10

## 2021-11-10 RX ADMIN — METOPROLOL TARTRATE 2.5 MG: 5 INJECTION, SOLUTION INTRAVENOUS at 10:11

## 2021-11-10 RX ADMIN — NITROGLYCERIN 1 SPRAY: 400 SPRAY ORAL at 10:11

## 2021-11-10 RX ADMIN — IOHEXOL 90 ML: 350 INJECTION, SOLUTION INTRAVENOUS at 10:11

## 2021-11-11 ENCOUNTER — TELEPHONE (OUTPATIENT)
Dept: CARDIOLOGY | Facility: CLINIC | Age: 76
End: 2021-11-11
Payer: MEDICARE

## 2021-11-12 ENCOUNTER — TELEPHONE (OUTPATIENT)
Dept: CARDIOLOGY | Facility: CLINIC | Age: 76
End: 2021-11-12
Payer: MEDICARE

## 2021-11-15 ENCOUNTER — LAB VISIT (OUTPATIENT)
Dept: LAB | Facility: HOSPITAL | Age: 76
End: 2021-11-15
Attending: NURSE PRACTITIONER
Payer: MEDICARE

## 2021-11-15 DIAGNOSIS — I20.89 STABLE ANGINA: ICD-10-CM

## 2021-11-15 DIAGNOSIS — R73.9 HYPERGLYCEMIA: ICD-10-CM

## 2021-11-15 DIAGNOSIS — E07.9 THYROID DYSFUNCTION: ICD-10-CM

## 2021-11-15 LAB
ESTIMATED AVG GLUCOSE: 123 MG/DL (ref 68–131)
HBA1C MFR BLD: 5.9 % (ref 4.5–6.2)
TSH SERPL DL<=0.005 MIU/L-ACNC: 1.15 UIU/ML (ref 0.34–5.6)

## 2021-11-15 PROCEDURE — 83036 HEMOGLOBIN GLYCOSYLATED A1C: CPT | Performed by: NURSE PRACTITIONER

## 2021-11-15 PROCEDURE — 84443 ASSAY THYROID STIM HORMONE: CPT | Performed by: NURSE PRACTITIONER

## 2021-11-15 PROCEDURE — 36415 COLL VENOUS BLD VENIPUNCTURE: CPT | Performed by: NURSE PRACTITIONER

## 2021-12-06 ENCOUNTER — LAB VISIT (OUTPATIENT)
Dept: LAB | Facility: HOSPITAL | Age: 76
End: 2021-12-06
Attending: PHYSICIAN ASSISTANT
Payer: MEDICARE

## 2021-12-06 DIAGNOSIS — I51.9 MYXEDEMA HEART DISEASE: Primary | ICD-10-CM

## 2021-12-06 DIAGNOSIS — E55.9 AVITAMINOSIS D: ICD-10-CM

## 2021-12-06 DIAGNOSIS — E03.9 MYXEDEMA HEART DISEASE: Primary | ICD-10-CM

## 2021-12-06 LAB
25(OH)D3+25(OH)D2 SERPL-MCNC: 87 NG/ML (ref 30–96)
ALBUMIN SERPL BCP-MCNC: 3.7 G/DL (ref 3.5–5.2)
ANION GAP SERPL CALC-SCNC: 9 MMOL/L (ref 8–16)
BUN SERPL-MCNC: 16 MG/DL (ref 8–23)
CALCIUM SERPL-MCNC: 9.8 MG/DL (ref 8.7–10.5)
CHLORIDE SERPL-SCNC: 106 MMOL/L (ref 95–110)
CO2 SERPL-SCNC: 24 MMOL/L (ref 23–29)
CREAT SERPL-MCNC: 1 MG/DL (ref 0.5–1.4)
EST. GFR  (AFRICAN AMERICAN): >60 ML/MIN/1.73 M^2
EST. GFR  (NON AFRICAN AMERICAN): 55.2 ML/MIN/1.73 M^2
GLUCOSE SERPL-MCNC: 109 MG/DL (ref 70–110)
PHOSPHATE SERPL-MCNC: 3 MG/DL (ref 2.7–4.5)
POTASSIUM SERPL-SCNC: 4 MMOL/L (ref 3.5–5.1)
SODIUM SERPL-SCNC: 139 MMOL/L (ref 136–145)
TSH SERPL DL<=0.005 MIU/L-ACNC: 2.52 UIU/ML (ref 0.4–4)

## 2021-12-06 PROCEDURE — 80069 RENAL FUNCTION PANEL: CPT | Performed by: PHYSICIAN ASSISTANT

## 2021-12-06 PROCEDURE — 36415 COLL VENOUS BLD VENIPUNCTURE: CPT | Performed by: PHYSICIAN ASSISTANT

## 2021-12-06 PROCEDURE — 84443 ASSAY THYROID STIM HORMONE: CPT | Performed by: PHYSICIAN ASSISTANT

## 2021-12-06 PROCEDURE — 82306 VITAMIN D 25 HYDROXY: CPT | Performed by: PHYSICIAN ASSISTANT

## 2022-02-10 ENCOUNTER — OFFICE VISIT (OUTPATIENT)
Dept: CARDIOLOGY | Facility: CLINIC | Age: 77
End: 2022-02-10
Payer: MEDICARE

## 2022-02-10 VITALS
HEIGHT: 61 IN | SYSTOLIC BLOOD PRESSURE: 130 MMHG | WEIGHT: 180 LBS | HEART RATE: 88 BPM | DIASTOLIC BLOOD PRESSURE: 70 MMHG | BODY MASS INDEX: 33.99 KG/M2

## 2022-02-10 DIAGNOSIS — I10 ESSENTIAL HYPERTENSION: Primary | ICD-10-CM

## 2022-02-10 DIAGNOSIS — M54.40 CHRONIC LOW BACK PAIN WITH SCIATICA, SCIATICA LATERALITY UNSPECIFIED, UNSPECIFIED BACK PAIN LATERALITY: ICD-10-CM

## 2022-02-10 DIAGNOSIS — Z78.9 STATIN INTOLERANCE: ICD-10-CM

## 2022-02-10 DIAGNOSIS — E07.9 THYROID DYSFUNCTION: ICD-10-CM

## 2022-02-10 DIAGNOSIS — G47.33 OBSTRUCTIVE SLEEP APNEA ON CPAP: ICD-10-CM

## 2022-02-10 DIAGNOSIS — G89.29 CHRONIC LOW BACK PAIN WITH SCIATICA, SCIATICA LATERALITY UNSPECIFIED, UNSPECIFIED BACK PAIN LATERALITY: ICD-10-CM

## 2022-02-10 PROCEDURE — 99214 PR OFFICE/OUTPT VISIT, EST, LEVL IV, 30-39 MIN: ICD-10-PCS | Mod: S$GLB,,, | Performed by: NURSE PRACTITIONER

## 2022-02-10 PROCEDURE — 99214 OFFICE O/P EST MOD 30 MIN: CPT | Mod: S$GLB,,, | Performed by: NURSE PRACTITIONER

## 2022-02-10 RX ORDER — TERAZOSIN 2 MG/1
2 CAPSULE ORAL NIGHTLY
Qty: 90 CAPSULE | Refills: 3 | Status: SHIPPED | OUTPATIENT
Start: 2022-02-10 | End: 2023-02-20

## 2022-02-10 RX ORDER — TERAZOSIN 5 MG/1
5 CAPSULE ORAL NIGHTLY
Qty: 90 CAPSULE | Refills: 3 | Status: SHIPPED | OUTPATIENT
Start: 2022-02-10 | End: 2023-02-16 | Stop reason: SDUPTHER

## 2022-02-10 NOTE — ASSESSMENT & PLAN NOTE
Controlled on present regimen  Losartan 100 HCTZ 25 daily and Hytrin 7 mg every night Imdur 30 daily

## 2022-02-10 NOTE — PROGRESS NOTES
Subjective:    Patient ID:  Iram Aguirre is a 76 y.o. female patient here for evaluation Hyperlipidemia and Hypertension      History of Present Illness:       Ms. Aguirre is here today for her follow up visit. She is being followed by ENT, and Pulmonary. She is also being followed by back DrPaige Vargas. Since last visit she has been diagnosed with osteopetrosis and chronic back issues. She has DJD, Osteoarthritis, and Scoliosis. She has gotten injections and has helped. She is also going to have a reclast injection. BP is stable. No chest pain. She is being treated for fungal infection in her sinus and URI.       Review of patient's allergies indicates:   Allergen Reactions    Cortisone Shortness Of Breath     Causes heart to race    Hair formula [mv,iron,min-folic acid-biotin]      dye    Levaquin [levofloxacin] Other (See Comments)     Tongue swelling and blisters    Codeine Nausea Only    Doxycycline      Other reaction(s): Unknown       Past Medical History:   Diagnosis Date    AR (allergic rhinitis) 11/29/2012    Benign hypertension     Depression     Diverticulosis 2005    Esophageal polyp     Fibromyalgia     Gastric ulcer; benign 2007    GERD (gastroesophageal reflux disease) 11/29/2012    Hypercholesteremia     Hypothyroidism     Kidney stones     MTHFR gene mutation     per daughter E72.8    Obstructive sleep apnea on CPAP     Osteopenia     Rectal polyp     Skin cancer     pre skin cancer skin    Vulvovaginal melanosis      Past Surgical History:   Procedure Laterality Date    CHOLECYSTECTOMY  08/08/2012    CHOLECYSTECTOMY      COLONOSCOPY N/A 2/6/2017    Procedure: COLONOSCOPY;  Surgeon: Arturo Crane MD;  Location: [facility];  Service: Endoscopy;  Laterality: N/A;    FOOT FRACTURE SURGERY  2005    plate in right foot    GANGLION CYST EXCISION  1980    left wrist    HYSTERECTOMY      KNEE ARTHROSCOPY W/ ACL RECONSTRUCTION      bilateral     NASAL SEPTUM  SURGERY      TEAR DUCT SURGERY  2006    left eye    TOTAL KNEE ARTHROPLASTY      bilateral    TOTAL VAGINAL HYSTERECTOMY  age 31    w/BSO    TUBAL LIGATION  age 26     Social History     Tobacco Use    Smoking status: Never Smoker    Smokeless tobacco: Never Used   Substance Use Topics    Alcohol use: No    Drug use: No        Review of Systems:    As noted in HPI in addition      REVIEW OF SYSTEMS  CARDIOVASCULAR: No recent chest pain, palpitations, arm, neck, or jaw pain  RESPIRATORY: No recent fever, cough chills, SOB or congestion  : No blood in the urine  GI: No Nausea, vomiting, constipation, diarrhea, blood, or reflux.  MUSCULOSKELETAL: No myalgias  NEURO: No lightheadedness or dizziness  EYES: No Double vision, blurry, vision or headache              Objective        Vitals:    02/10/22 1221   BP: 130/70   Pulse: 88       LIPIDS - LAST 2   Lab Results   Component Value Date    CHOL 224 (H) 11/08/2019    CHOL 152 08/01/2006    HDL 56 11/08/2019    HDL 57.0 08/01/2006    LDLCALC 145.0 11/08/2019    LDLCALC 85.0 08/01/2006    TRIG 115 11/08/2019    TRIG 50 08/01/2006    CHOLHDL 25.0 11/08/2019    CHOLHDL 37.5 08/01/2006       CBC - LAST 2  Lab Results   Component Value Date    WBC 8.18 03/24/2021    WBC 7.52 11/07/2019    RBC 3.97 (L) 03/24/2021    RBC 4.09 11/07/2019    HGB 12.1 03/24/2021    HGB 12.8 11/07/2019    HCT 38.1 03/24/2021    HCT 39.2 11/07/2019    MCV 96 03/24/2021    MCV 96 11/07/2019    MCH 30.5 03/24/2021    MCH 31.3 (H) 11/07/2019    MCHC 31.8 (L) 03/24/2021    MCHC 32.7 11/07/2019    RDW 12.7 03/24/2021    RDW 12.8 11/07/2019     (H) 03/24/2021     11/07/2019    MPV 9.5 03/24/2021    MPV 9.5 11/07/2019    GRAN 4.9 03/24/2021    GRAN 60.0 03/24/2021    LYMPH 2.2 03/24/2021    LYMPH 27.4 03/24/2021    MONO 0.8 03/24/2021    MONO 9.5 03/24/2021    BASO 0.05 03/24/2021    BASO 0.03 11/07/2019    NRBC 0 03/24/2021    NRBC 0 11/07/2019       CHEMISTRY & LIVER FUNCTION - LAST  2  Lab Results   Component Value Date     12/06/2021     03/24/2021    K 4.0 12/06/2021    K 3.9 03/24/2021     12/06/2021     03/24/2021    CO2 24 12/06/2021    CO2 29 03/24/2021    ANIONGAP 9 12/06/2021    ANIONGAP 9 03/24/2021    BUN 16 12/06/2021    BUN 15 11/10/2021    CREATININE 1.0 12/06/2021    CREATININE 0.8 11/10/2021     12/06/2021     (H) 03/24/2021    CALCIUM 9.8 12/06/2021    CALCIUM 9.3 03/24/2021    MG 1.8 11/08/2019    ALBUMIN 3.7 12/06/2021    ALBUMIN 3.1 (L) 03/24/2021    PROT 7.7 03/24/2021    PROT 7.5 11/07/2019    ALKPHOS 101 03/24/2021    ALKPHOS 65 11/07/2019    ALT 33 03/24/2021    ALT 19 11/07/2019    AST 27 03/24/2021    AST 23 11/07/2019    BILITOT 0.5 03/24/2021    BILITOT 1.1 (H) 11/07/2019        CARDIAC PROFILE - LAST 2  Lab Results   Component Value Date    BNP 34 03/24/2021    BNP 16 11/07/2019    CPK 68 03/24/2021     03/24/2021    TROPONINI <0.030 03/24/2021    TROPONINI <0.030 11/08/2019        COAGULATION - LAST 2  Lab Results   Component Value Date    LABPT 13.8 11/07/2019    INR 1.1 11/07/2019       ENDOCRINE & PSA - LAST 2  Lab Results   Component Value Date    HGBA1C 5.9 11/15/2021    HGBA1C 5.9 11/08/2019    TSH 2.517 12/06/2021    TSH 1.150 11/15/2021    PROCAL <0.05 03/24/2021        ECHOCARDIOGRAM RESULTS  Results for orders placed during the hospital encounter of 10/26/21    Echo    Interpretation Summary  · The left ventricle is normal in size with mild concentric hypertrophy and hyperdynamic systolic function.  · The estimated ejection fraction is 78%.  · Normal right ventricular size with normal right ventricular systolic function.  · Mild left atrial enlargement.  · Mild tricuspid regurgitation.  · Normal central venous pressure (3 mmHg).  · The estimated PA systolic pressure is 28 mmHg.      CURRENT/PREVIOUS VISIT EKG  Results for orders placed or performed during the hospital encounter of 09/20/21   EKG 12-lead     Collection Time: 09/20/21 11:18 AM    Narrative    Test Reason : R07.9,    Vent. Rate : 069 BPM     Atrial Rate : 069 BPM     P-R Int : 160 ms          QRS Dur : 126 ms      QT Int : 430 ms       P-R-T Axes : 067 065 042 degrees     QTc Int : 460 ms    Normal sinus rhythm  Right bundle branch block  Abnormal ECG  When compared with ECG of 24-MAR-2021 18:13,  No significant change was found  Confirmed by Sandip CRUZ, Koko CAVAZOS ([id number]) on 9/22/2021 10:50:37 PM    Referred By: JENNA   SELF           Confirmed By:Koko Oropeza MD     No valid procedures specified.   Results for orders placed during the hospital encounter of 10/26/21    Nuclear Stress - Cardiology Interpreted    Interpretation Summary    Abnormal myocardial perfusion scan.    There is a moderate to severe intensity, moderate sized, equivocal defect that is fixed in the basal to apical anterior wall(s). This finding is equivocal due to a breast shadow overlying the myocardium.    There is a moderate to severe intensity defect in the anterior wall of the left ventricle, secondary to breast attenuation.    The gated perfusion images showed an ejection fraction of 80% post stress. Normal ejection fraction is greater than 53%.    There is normal wall motion post stress.    LV cavity size is  and normal at stress.    The EKG portion of this study is negative for ischemia.    The patient reported no chest pain during the stress test.    There were no arrhythmias during stress.      PHYSICAL EXAM  CONSTITUTIONAL: Well built, well nourished in no apparent distress  NECK: no carotid bruit, no JVD  LUNGS: CTA  CHEST WALL: no tenderness  HEART: regular rate and rhythm, S1, S2 normal, no murmur, click, rub or gallop   ABDOMEN: soft, non-tender; bowel sounds normal; no masses,  no organomegaly  EXTREMITIES: Extremities normal, no edema, no calf tenderness noted  NEURO: AAO X 3    I HAVE REVIEWED :    The vital signs, nurses notes, and all the pertinent  radiology and labs.        Current Outpatient Medications   Medication Instructions    albuterol (PROVENTIL/VENTOLIN HFA) 90 mcg/actuation inhaler 2 puffs, Inhalation, Every 4 hours PRN, Rescue     B-complex with vitamin C tablet 1 tablet, Oral, Daily    cetirizine (ZYRTEC) 10 mg, Oral, Daily PRN    co-enzyme Q-10 30 mg, Oral, Daily    FLUoxetine 20 mg, Oral, Daily    fluticasone (FLONASE) 50 mcg/actuation nasal spray 1 spray, Each Nostril, Daily PRN    ipratropium (ATROVENT) 0.03 % nasal spray 2 sprays, Nasal, Daily PRN    isosorbide mononitrate (IMDUR) 30 mg, Oral, Daily    KRILL OIL ORAL Oral    Lactobacillus rhamnosus GG (CULTURELLE) 10 billion cell capsule 1 capsule, Oral, Daily    levomefolate calcium (DEPLIN) 15 mg, Oral, Daily    loratadine (CLARITIN) 10 mg, Oral, Daily    losartan-hydrochlorothiazide 100-25 mg (HYZAAR) 100-25 mg per tablet 1 tablet, Oral, Daily    magnesium 30 mg, Oral, Daily    mv-mn/folic acid/vit [id number] (ALIVE ONCE DAILY WOMEN 50 PLUS ORAL) 1 tablet, Oral, Daily    om 3/E/linol/ala/oleic/gla/lip (OMEGA 3-6-9 ORAL) Oral    potassium chloride SA (K-DUR,KLOR-CON) 10 MEQ tablet 10 mEq, Oral, 2 times daily    terazosin (HYTRIN) 2 mg, Oral, Nightly    terazosin (HYTRIN) 5 mg, Oral, Nightly    TIROSINT 88 mcg, Oral, Daily          Assessment & Plan     Essential hypertension  Controlled on present regimen  Losartan 100 HCTZ 25 daily and Hytrin 7 mg every night Imdur 30 daily    Obstructive sleep apnea on CPAP  Continue to use CPAP    Chronic low back pain with sciatica  Following with a back doctor    Thyroid dysfunction  Stable followed by PCP    Statin intolerance  Patient also could not tolerate next for that.  Will repeat lipid panel          No follow-ups on file.

## 2022-02-21 ENCOUNTER — PATIENT MESSAGE (OUTPATIENT)
Dept: CARDIOLOGY | Facility: CLINIC | Age: 77
End: 2022-02-21
Payer: MEDICARE

## 2022-02-22 ENCOUNTER — TELEPHONE (OUTPATIENT)
Dept: CARDIOLOGY | Facility: CLINIC | Age: 77
End: 2022-02-22
Payer: MEDICARE

## 2022-02-24 ENCOUNTER — PATIENT MESSAGE (OUTPATIENT)
Dept: CARDIOLOGY | Facility: CLINIC | Age: 77
End: 2022-02-24
Payer: MEDICARE

## 2022-03-29 RX ORDER — LOSARTAN POTASSIUM AND HYDROCHLOROTHIAZIDE 25; 100 MG/1; MG/1
1 TABLET ORAL DAILY
Qty: 90 TABLET | Refills: 3 | Status: SHIPPED | OUTPATIENT
Start: 2022-03-29 | End: 2023-03-20

## 2022-06-15 ENCOUNTER — HOSPITAL ENCOUNTER (EMERGENCY)
Facility: HOSPITAL | Age: 77
Discharge: HOME | End: 2022-06-15
Attending: EMERGENCY MEDICINE
Payer: MEDICARE

## 2022-06-15 VITALS
TEMPERATURE: 99 F | HEART RATE: 79 BPM | DIASTOLIC BLOOD PRESSURE: 71 MMHG | OXYGEN SATURATION: 96 % | SYSTOLIC BLOOD PRESSURE: 153 MMHG | RESPIRATION RATE: 16 BRPM

## 2022-06-15 DIAGNOSIS — S61.211A LACERATION OF LEFT INDEX FINGER WITHOUT FOREIGN BODY WITHOUT DAMAGE TO NAIL, INITIAL ENCOUNTER: Primary | ICD-10-CM

## 2022-06-15 PROCEDURE — 90471 IMMUNIZATION ADMIN: CPT | Performed by: STUDENT IN AN ORGANIZED HEALTH CARE EDUCATION/TRAINING PROGRAM

## 2022-06-15 PROCEDURE — 12002 RPR S/N/AX/GEN/TRNK2.6-7.5CM: CPT

## 2022-06-15 PROCEDURE — 99284 EMERGENCY DEPT VISIT MOD MDM: CPT | Mod: 25

## 2022-06-15 PROCEDURE — 63600175 PHARM REV CODE 636 W HCPCS: Performed by: STUDENT IN AN ORGANIZED HEALTH CARE EDUCATION/TRAINING PROGRAM

## 2022-06-15 PROCEDURE — 90715 TDAP VACCINE 7 YRS/> IM: CPT | Performed by: STUDENT IN AN ORGANIZED HEALTH CARE EDUCATION/TRAINING PROGRAM

## 2022-06-15 PROCEDURE — 25000003 PHARM REV CODE 250: Performed by: STUDENT IN AN ORGANIZED HEALTH CARE EDUCATION/TRAINING PROGRAM

## 2022-06-15 RX ORDER — LIDOCAINE HYDROCHLORIDE 10 MG/ML
10 INJECTION, SOLUTION EPIDURAL; INFILTRATION; INTRACAUDAL; PERINEURAL
Status: COMPLETED | OUTPATIENT
Start: 2022-06-15 | End: 2022-06-15

## 2022-06-15 RX ORDER — LIDOCAINE HYDROCHLORIDE AND EPINEPHRINE 10; 10 MG/ML; UG/ML
10 INJECTION, SOLUTION INFILTRATION; PERINEURAL
Status: COMPLETED | OUTPATIENT
Start: 2022-06-15 | End: 2022-06-15

## 2022-06-15 RX ORDER — CEPHALEXIN 500 MG/1
500 CAPSULE ORAL 4 TIMES DAILY
Qty: 20 CAPSULE | Refills: 0 | Status: SHIPPED | OUTPATIENT
Start: 2022-06-15 | End: 2022-06-20

## 2022-06-15 RX ADMIN — LIDOCAINE HYDROCHLORIDE AND EPINEPHRINE 10 ML: 10; 10 INJECTION, SOLUTION INFILTRATION; PERINEURAL at 04:06

## 2022-06-15 RX ADMIN — CLOSTRIDIUM TETANI TOXOID ANTIGEN (FORMALDEHYDE INACTIVATED), CORYNEBACTERIUM DIPHTHERIAE TOXOID ANTIGEN (FORMALDEHYDE INACTIVATED), BORDETELLA PERTUSSIS TOXOID ANTIGEN (GLUTARALDEHYDE INACTIVATED), BORDETELLA PERTUSSIS FILAMENTOUS HEMAGGLUTININ ANTIGEN (FORMALDEHYDE INACTIVATED), BORDETELLA PERTUSSIS PERTACTIN ANTIGEN, AND BORDETELLA PERTUSSIS FIMBRIAE 2/3 ANTIGEN 0.5 ML: 5; 2; 2.5; 5; 3; 5 INJECTION, SUSPENSION INTRAMUSCULAR at 04:06

## 2022-06-15 RX ADMIN — LIDOCAINE HYDROCHLORIDE 100 MG: 10 INJECTION, SOLUTION EPIDURAL; INFILTRATION; INTRACAUDAL; PERINEURAL at 04:06

## 2022-06-15 NOTE — ED PROVIDER NOTES
Encounter Date: 6/15/2022       History     Chief Complaint   Patient presents with    Laceration     Patient cut L first 4 fingers on electric  cutting jose angel bushes today, denies blood thinners      76-year-old female presents emergency room for evaluation of laceration to multiple fingers of her left hand.  Patient states that she was using  cutting her jose angel bushes with the caro slipped and cut her left hand.  Bleeding controlled prior to arrival.  Last tetanus unknown.  She has no loss of sensation, no loss of motor function.        Review of patient's allergies indicates:   Allergen Reactions    Cortisone Shortness Of Breath     Causes heart to race    Hair formula [mv,iron,min-folic acid-biotin]      dye    Levaquin [levofloxacin] Other (See Comments)     Tongue swelling and blisters    Codeine Nausea Only    Doxycycline      Other reaction(s): Unknown     Past Medical History:   Diagnosis Date    AR (allergic rhinitis) 11/29/2012    Benign hypertension     Depression     Diverticulosis 2005    Esophageal polyp     Fibromyalgia     Gastric ulcer; benign 2007    GERD (gastroesophageal reflux disease) 11/29/2012    Hypercholesteremia     Hypothyroidism     Kidney stones     MTHFR gene mutation     per daughter E72.8    Obstructive sleep apnea on CPAP     Osteopenia     Rectal polyp     Skin cancer     pre skin cancer skin    Vulvovaginal melanosis      Past Surgical History:   Procedure Laterality Date    CHOLECYSTECTOMY  08/08/2012    CHOLECYSTECTOMY      COLONOSCOPY N/A 2/6/2017    Procedure: COLONOSCOPY;  Surgeon: Arturo Crane MD;  Location: [facility];  Service: Endoscopy;  Laterality: N/A;    FOOT FRACTURE SURGERY  2005    plate in right foot    GANGLION CYST EXCISION  1980    left wrist    HYSTERECTOMY      KNEE ARTHROSCOPY W/ ACL RECONSTRUCTION      bilateral     NASAL SEPTUM SURGERY      TEAR DUCT SURGERY  2006    left eye    TOTAL KNEE  ARTHROPLASTY      bilateral    TOTAL VAGINAL HYSTERECTOMY  age 31    w/BSO    TUBAL LIGATION  age 26     Family History   Problem Relation Age of Onset    Heart disease Father     Kidney disease Mother      Social History     Tobacco Use    Smoking status: Never Smoker    Smokeless tobacco: Never Used   Substance Use Topics    Alcohol use: No    Drug use: No     Review of Systems   Constitutional: Negative for chills, fatigue and fever.   HENT: Negative for congestion, hearing loss, sore throat and trouble swallowing.    Eyes: Negative for visual disturbance.   Respiratory: Negative for cough, chest tightness and shortness of breath.    Cardiovascular: Negative for chest pain.   Gastrointestinal: Negative for abdominal pain and nausea.   Endocrine: Negative for polyuria.   Genitourinary: Negative for difficulty urinating.   Musculoskeletal: Negative for arthralgias and myalgias.   Skin: Positive for wound. Negative for rash.   Neurological: Negative for dizziness and headaches.   Psychiatric/Behavioral: The patient is not nervous/anxious.    All other systems reviewed and are negative.      Physical Exam     Initial Vitals [06/15/22 1505]   BP Pulse Resp Temp SpO2   (!) 153/71 79 16 98.8 °F (37.1 °C) 96 %      MAP       --         Physical Exam    Nursing note and vitals reviewed.  Constitutional: She appears well-developed and well-nourished.   HENT:   Head: Normocephalic and atraumatic.   Eyes: Conjunctivae are normal. Pupils are equal, round, and reactive to light.   Neck: Neck supple.   Normal range of motion.  Cardiovascular: Normal rate, regular rhythm and normal heart sounds.   Pulmonary/Chest: Breath sounds normal.   Abdominal: Abdomen is soft. She exhibits no distension. There is no abdominal tenderness.   Musculoskeletal:         General: Normal range of motion.      Cervical back: Normal range of motion and neck supple.     Neurological: She is alert and oriented to person, place, and time. GCS  score is 15. GCS eye subscore is 4. GCS verbal subscore is 5. GCS motor subscore is 6.   Skin: Skin is warm and dry. Capillary refill takes less than 2 seconds.   Left hand, index and middle finger demonstrate macerated lacerations to the palmar aspect of the distal fingers.  There is a small shallow laceration to the 4th finger, middle phalanx.  Bleeding controlled.  No foreign bodies.   Psychiatric: She has a normal mood and affect. Her behavior is normal. Judgment and thought content normal.         ED Course   Lac Repair    Date/Time: 6/15/2022 4:54 PM  Performed by: Jordan Quintero PA-C  Authorized by: Sonja Jefferson MD     Consent:     Consent obtained:  Verbal    Consent given by:  Patient    Risks discussed:  Infection, pain, poor cosmetic result, need for additional repair, nerve damage and poor wound healing    Alternatives discussed:  No treatment  Laceration details:     Location:  Finger    Finger location:  L index finger    Length (cm):  4    Depth (mm):  5  Pre-procedure details:     Preparation:  Patient was prepped and draped in usual sterile fashion  Exploration:     Imaging obtained: x-ray      Imaging outcome: foreign body not noted      Wound exploration: wound explored through full range of motion and entire depth of wound visualized    Treatment:     Area cleansed with:  Povidone-iodine    Amount of cleaning:  Extensive    Irrigation solution:  Sterile water    Irrigation volume:  1L    Irrigation method:  Syringe    Debridement:  None  Skin repair:     Repair method:  Sutures    Suture size:  4-0    Suture material:  Nylon    Suture technique:  Simple interrupted    Number of sutures:  6  Approximation:     Approximation:  Loose  Repair type:     Repair type:  Simple  Post-procedure details:     Dressing:  Open (no dressing)    Lac Repair    Date/Time: 6/15/2022 4:59 PM  Performed by: Jordan Quintero PA-C  Authorized by: Sonja Jefferson MD     Consent:     Consent obtained:   Verbal  Laceration details:     Location:  Finger    Finger location:  L long finger    Length (cm):  3    Depth (mm):  5  Pre-procedure details:     Preparation:  Imaging obtained to evaluate for foreign bodies  Exploration:     Imaging obtained: x-ray      Imaging outcome: foreign body not noted      Wound exploration: wound explored through full range of motion and entire depth of wound visualized    Treatment:     Area cleansed with:  Povidone-iodine    Amount of cleaning:  Standard    Irrigation solution:  Sterile water    Irrigation volume:  1L    Irrigation method:  Syringe  Skin repair:     Repair method:  Sutures    Suture size:  4-0    Suture material:  Nylon    Suture technique:  Simple interrupted    Number of sutures:  3  Approximation:     Approximation:  Loose  Repair type:     Repair type:  Simple  Post-procedure details:     Dressing:  Open (no dressing)      Labs Reviewed - No data to display       Imaging Results          X-Ray Hand 3 view Left (Final result)  Result time 06/15/22 15:23:50    Final result by Radu Foote MD (06/15/22 15:23:50)                 Narrative:    Reason: Laceration to fingers Laceration to digits 2-4 on left hand; Unable to remove watch, unable to remove bandage due to active bleeding    FINDINGS:  Three views of left hand show no fracture, dislocation, or destructive osseous lesion. Soft tissue swelling distal left index finger and distal left long finger is evident. No retained metallic fragment or radiopaque foreign body identified. Patient's watch projects over distal left forearm and left wrist junction, limiting evaluation of such. Mild left first CMC joint osteoarthrosis is present. Mild osteoarthrosis affects the second PIP joint.    IMPRESSION:  Distal left index and long finger soft tissue swelling without radiopaque foreign body or fracture.    Electronically signed by:  Radu Foote MD  6/15/2022 3:23 PM CDT Workstation: [id number]                                Medications   LIDOcaine (PF) 10 mg/ml (1%) injection 100 mg (100 mg Infiltration Given 6/15/22 1634)   Tdap vaccine injection 0.5 mL (0.5 mLs Intramuscular Given 6/15/22 1633)   LIDOcaine-EPINEPHrine 1%-1:100,000 injection 10 mL (10 mLs Intradermal Given 6/15/22 1634)     Medical Decision Making:   Initial Assessment:   76-year-old female presents emergency room for evaluation of laceration to multiple fingers of her left hand.  Patient states that she was using  cutting her jose angel bushes with the caro slipped and cut her left hand.  Bleeding controlled prior to arrival.  Last tetanus unknown.  She has no loss of sensation, no loss of motor function.  ED Management:  76-year-old female presenting as above for evaluation of laceration of multiple fingers of the left hand.  Plain films do not demonstrate acute fracture, dislocation or foreign body.  Primary lacerations to index and middle finger.  They are jagged/macerated.  I was able to loosely approximate the tissue after thorough irrigation.  Patient remained neurovascularly intact, with full active range of motion.  I do not suspect underlying tendinous injury.  Patient was placed on Keflex given multiple antibiotic allergies.  Patient tetanus was updated today.    Disposition:  Improved, discharged.  Plan to discharge home with appropriate follow-up, including primary care manager.  Will discharge with prescription for Keflex.    I discussed the findings and plan of care with this patient.  All questions were answered to the patient's satisfaction.  Disposition plan as above.  Verbal and written discharge instructions provided to the patient on discharge.  Return precautions discussed prior to discharge.     I discuss this patient case with the cosigning physician, who agrees with diagnosis and plan of care. This note was written using the assistance of a dictation program and may contain grammatical errors.                       Clinical  Impression:   Final diagnoses:  [S61.211A] Laceration of left index finger without foreign body without damage to nail, initial encounter (Primary)          ED Disposition Condition    Discharge Stable        ED Prescriptions     Medication Sig Dispense Start Date End Date Auth. Provider    cephALEXin (KEFLEX) 500 MG capsule Take 1 capsule (500 mg total) by mouth 4 (four) times daily. for 5 days 20 capsule 6/15/2022 6/20/2022 Jordan Quintero PA-C        Follow-up Information     Follow up With Specialties Details Why Contact Info Additional Information    Ros Macedo, NP Family Medicine Schedule an appointment as soon as possible for a visit in 2 days For wound re-check [address] [place] [address] [phone]       [facility] - Emergency Dept Emergency Medicine Go to  As needed, If symptoms worsen, For wound re-check [address] [facility] [place] [address] [phone] 1st floor           Jordan Quintero PA-C  06/15/22 [id number]

## 2022-06-15 NOTE — DISCHARGE INSTRUCTIONS
Follow-up with primary care, urgent care or emergency care for wound re-evaluation in 2 days.  Follow-up again in approximately 5-7 days for suture removal.  Take antibiotics as prescribed.  Do not soak or submerge wound.  Do not apply topical medicines to the wound.  Do not cover.  Keep clean and dry.

## 2022-09-15 ENCOUNTER — OFFICE VISIT (OUTPATIENT)
Dept: CARDIOLOGY | Facility: CLINIC | Age: 77
End: 2022-09-15
Payer: MEDICARE

## 2022-09-15 ENCOUNTER — LAB VISIT (OUTPATIENT)
Dept: LAB | Facility: HOSPITAL | Age: 77
End: 2022-09-15
Payer: MEDICARE

## 2022-09-15 ENCOUNTER — TELEPHONE (OUTPATIENT)
Dept: CARDIOLOGY | Facility: CLINIC | Age: 77
End: 2022-09-15

## 2022-09-15 VITALS
HEIGHT: 61 IN | BODY MASS INDEX: 34.17 KG/M2 | RESPIRATION RATE: 16 BRPM | HEART RATE: 83 BPM | WEIGHT: 181 LBS | OXYGEN SATURATION: 97 % | SYSTOLIC BLOOD PRESSURE: 136 MMHG | DIASTOLIC BLOOD PRESSURE: 82 MMHG

## 2022-09-15 DIAGNOSIS — Z78.9 STATIN INTOLERANCE: ICD-10-CM

## 2022-09-15 DIAGNOSIS — R06.09 DYSPNEA ON EXERTION: ICD-10-CM

## 2022-09-15 DIAGNOSIS — E78.00 HYPERCHOLESTEREMIA: ICD-10-CM

## 2022-09-15 DIAGNOSIS — I10 ESSENTIAL HYPERTENSION: ICD-10-CM

## 2022-09-15 DIAGNOSIS — R94.31 ABNORMAL ELECTROCARDIOGRAM (ECG) (EKG): ICD-10-CM

## 2022-09-15 DIAGNOSIS — G47.33 OBSTRUCTIVE SLEEP APNEA ON CPAP: ICD-10-CM

## 2022-09-15 LAB
ALBUMIN SERPL BCP-MCNC: 3.9 G/DL (ref 3.5–5.2)
ALP SERPL-CCNC: 67 U/L (ref 55–135)
ALT SERPL W/O P-5'-P-CCNC: 21 U/L (ref 10–44)
ANION GAP SERPL CALC-SCNC: 7 MMOL/L (ref 8–16)
AST SERPL-CCNC: 26 U/L (ref 10–40)
BILIRUB SERPL-MCNC: 0.8 MG/DL (ref 0.1–1)
BNP SERPL-MCNC: 10 PG/ML (ref 0–99)
BUN SERPL-MCNC: 17 MG/DL (ref 8–23)
CALCIUM SERPL-MCNC: 9.9 MG/DL (ref 8.7–10.5)
CHLORIDE SERPL-SCNC: 103 MMOL/L (ref 95–110)
CO2 SERPL-SCNC: 30 MMOL/L (ref 23–29)
CREAT SERPL-MCNC: 0.9 MG/DL (ref 0.5–1.4)
ERYTHROCYTE [DISTWIDTH] IN BLOOD BY AUTOMATED COUNT: 13.5 % (ref 11.5–14.5)
EST. GFR  (NO RACE VARIABLE): >60 ML/MIN/1.73 M^2
GLUCOSE SERPL-MCNC: 103 MG/DL (ref 70–110)
HCT VFR BLD AUTO: 37.8 % (ref 37–48.5)
HGB BLD-MCNC: 12.4 G/DL (ref 12–16)
MCH RBC QN AUTO: 32.1 PG (ref 27–31)
MCHC RBC AUTO-ENTMCNC: 32.8 G/DL (ref 32–36)
MCV RBC AUTO: 98 FL (ref 82–98)
PLATELET # BLD AUTO: 260 K/UL (ref 150–450)
PMV BLD AUTO: 9.8 FL (ref 9.2–12.9)
POTASSIUM SERPL-SCNC: 3.6 MMOL/L (ref 3.5–5.1)
PROT SERPL-MCNC: 7.4 G/DL (ref 6–8.4)
RBC # BLD AUTO: 3.86 M/UL (ref 4–5.4)
SODIUM SERPL-SCNC: 140 MMOL/L (ref 136–145)
TSH SERPL DL<=0.005 MIU/L-ACNC: 1.62 UIU/ML (ref 0.34–5.6)
WBC # BLD AUTO: 6.07 K/UL (ref 3.9–12.7)

## 2022-09-15 PROCEDURE — 84443 ASSAY THYROID STIM HORMONE: CPT

## 2022-09-15 PROCEDURE — 36415 COLL VENOUS BLD VENIPUNCTURE: CPT

## 2022-09-15 PROCEDURE — 80053 COMPREHEN METABOLIC PANEL: CPT

## 2022-09-15 PROCEDURE — 99214 OFFICE O/P EST MOD 30 MIN: CPT | Mod: S$GLB,,,

## 2022-09-15 PROCEDURE — 83880 ASSAY OF NATRIURETIC PEPTIDE: CPT

## 2022-09-15 PROCEDURE — 85027 COMPLETE CBC AUTOMATED: CPT

## 2022-09-15 PROCEDURE — 99214 PR OFFICE/OUTPT VISIT, EST, LEVL IV, 30-39 MIN: ICD-10-PCS | Mod: S$GLB,,,

## 2022-09-15 NOTE — PROGRESS NOTES
Subjective:    Patient ID:  Iram Aguirre is a 77 y.o. female patient here for evaluation Follow-up      History of Present Illness:     Patient is here today for a follow up.  Brings  a BP log with her today that shows readings from 101 systolic to 151 systolic. She states she does not check it at the same time everyday because that is what her PCP told her to do.   She went up to [facility] in July and the altitude change and stopped all her allergy medications after her sinus surgery. She has been getting SOB/JAUREGUI since then and rest helps. This is a new problem in the past 3-4 weeks and she has not experienced this before.       Review of patient's allergies indicates:   Allergen Reactions    Cortisone Shortness Of Breath     Causes heart to race    Hair formula [mv,iron,min-folic acid-biotin]      dye    Levaquin [levofloxacin] Other (See Comments)     Tongue swelling and blisters    Codeine Nausea Only    Doxycycline      Other reaction(s): Unknown       Past Medical History:   Diagnosis Date    AR (allergic rhinitis) 11/29/2012    Benign hypertension     Depression     Diverticulosis 2005    Esophageal polyp     Fibromyalgia     Gastric ulcer; benign 2007    GERD (gastroesophageal reflux disease) 11/29/2012    Hypercholesteremia     Hypothyroidism     Kidney stones     MTHFR gene mutation     per daughter E72.8    Obstructive sleep apnea on CPAP     Osteopenia     Rectal polyp     Skin cancer     pre skin cancer skin    Vulvovaginal melanosis      Past Surgical History:   Procedure Laterality Date    CHOLECYSTECTOMY  08/08/2012    CHOLECYSTECTOMY      COLONOSCOPY N/A 2/6/2017    Procedure: COLONOSCOPY;  Surgeon: Arturo Crane MD;  Location: [facility];  Service: Endoscopy;  Laterality: N/A;    FOOT FRACTURE SURGERY  2005    plate in right foot    GANGLION CYST EXCISION  1980    left wrist    HYSTERECTOMY      KNEE ARTHROSCOPY W/ ACL RECONSTRUCTION      bilateral     NASAL SEPTUM SURGERY      TEAR  DUCT SURGERY  2006    left eye    TOTAL KNEE ARTHROPLASTY      bilateral    TOTAL VAGINAL HYSTERECTOMY  age 31    w/BSO    TUBAL LIGATION  age 26     Social History     Tobacco Use    Smoking status: Never    Smokeless tobacco: Never   Substance Use Topics    Alcohol use: No    Drug use: No        Review of Systems:    As noted in HPI     REVIEW OF SYSTEMS  CARDIOVASCULAR: No recent chest pain, palpitations, arm, neck, or jaw pain  RESPIRATORY: + new onset JAUREGUI and SOB. No recent fever, cough chills, or congestion  : No blood in the urine  GI: No Nausea, vomiting, constipation, diarrhea, blood, or reflux.  MUSCULOSKELETAL: No myalgias  NEURO: No lightheadedness or dizziness  EYES: No Double vision, blurry, vision or headache            Objective        Vitals:    09/15/22 1141   BP: 136/82   Pulse: 83   Resp: 16       LIPIDS - LAST 2   Lab Results   Component Value Date    CHOL 224 (H) 11/08/2019    CHOL 152 08/01/2006    HDL 56 11/08/2019    HDL 57.0 08/01/2006    LDLCALC 145.0 11/08/2019    LDLCALC 85.0 08/01/2006    TRIG 115 11/08/2019    TRIG 50 08/01/2006    CHOLHDL 25.0 11/08/2019    CHOLHDL 37.5 08/01/2006       CBC - LAST 2  Lab Results   Component Value Date    WBC 8.18 03/24/2021    WBC 7.52 11/07/2019    RBC 3.97 (L) 03/24/2021    RBC 4.09 11/07/2019    HGB 12.1 03/24/2021    HGB 12.8 11/07/2019    HCT 38.1 03/24/2021    HCT 39.2 11/07/2019    MCV 96 03/24/2021    MCV 96 11/07/2019    MCH 30.5 03/24/2021    MCH 31.3 (H) 11/07/2019    MCHC 31.8 (L) 03/24/2021    MCHC 32.7 11/07/2019    RDW 12.7 03/24/2021    RDW 12.8 11/07/2019     (H) 03/24/2021     11/07/2019    MPV 9.5 03/24/2021    MPV 9.5 11/07/2019    GRAN 4.9 03/24/2021    GRAN 60.0 03/24/2021    LYMPH 2.2 03/24/2021    LYMPH 27.4 03/24/2021    MONO 0.8 03/24/2021    MONO 9.5 03/24/2021    BASO 0.05 03/24/2021    BASO 0.03 11/07/2019    NRBC 0 03/24/2021    NRBC 0 11/07/2019       CHEMISTRY & LIVER FUNCTION - LAST 2  Lab Results    Component Value Date     12/30/2021     12/06/2021    K 3.8 12/30/2021    K 4.0 12/06/2021     (H) 12/30/2021     12/06/2021    CO2 26 12/30/2021    CO2 24 12/06/2021    ANIONGAP 9 12/06/2021    ANIONGAP 9 03/24/2021    BUN 12 12/30/2021    BUN 16 12/06/2021    CREATININE 0.82 12/30/2021    CREATININE 1.0 12/06/2021     12/06/2021     (H) 03/24/2021    CALCIUM 9.2 12/30/2021    CALCIUM 9.8 12/06/2021    MG 1.8 11/08/2019    ALBUMIN 3.4 12/30/2021    ALBUMIN 3.7 12/06/2021    PROT 7.7 03/24/2021    PROT 7.5 11/07/2019    ALKPHOS 101 03/24/2021    ALKPHOS 65 11/07/2019    ALT 33 03/24/2021    ALT 19 11/07/2019    AST 27 03/24/2021    AST 23 11/07/2019    BILITOT 0.5 03/24/2021    BILITOT 1.1 (H) 11/07/2019        CARDIAC PROFILE - LAST 2  Lab Results   Component Value Date    BNP 34 03/24/2021    BNP 16 11/07/2019    CPK 68 03/24/2021     03/24/2021    TROPONINI <0.030 03/24/2021    TROPONINI <0.030 11/08/2019        COAGULATION - LAST 2  Lab Results   Component Value Date    LABPT 13.8 11/07/2019    INR 1.1 11/07/2019       ENDOCRINE & PSA - LAST 2  Lab Results   Component Value Date    HGBA1C 5.9 11/15/2021    HGBA1C 5.9 11/08/2019    TSH 2.517 12/06/2021    TSH 1.150 11/15/2021    PROCAL <0.05 03/24/2021        ECHOCARDIOGRAM RESULTS  Results for orders placed during the hospital encounter of 10/26/21    Echo    Interpretation Summary  · The left ventricle is normal in size with mild concentric hypertrophy and hyperdynamic systolic function.  · The estimated ejection fraction is 78%.  · Normal right ventricular size with normal right ventricular systolic function.  · Mild left atrial enlargement.  · Mild tricuspid regurgitation.  · Normal central venous pressure (3 mmHg).  · The estimated PA systolic pressure is 28 mmHg.      CURRENT/PREVIOUS VISIT EKG  Results for orders placed or performed during the hospital encounter of 09/20/21   EKG 12-lead    Collection Time:  09/20/21 11:18 AM    Narrative    Test Reason : R07.9,    Vent. Rate : 069 BPM     Atrial Rate : 069 BPM     P-R Int : 160 ms          QRS Dur : 126 ms      QT Int : 430 ms       P-R-T Axes : 067 065 042 degrees     QTc Int : 460 ms    Normal sinus rhythm  Right bundle branch block  Abnormal ECG  When compared with ECG of 24-MAR-2021 18:13,  No significant change was found  Confirmed by Koko Oropeza MD ([id number]) on 9/22/2021 10:50:37 PM    Referred By: JENNA   SELF           Confirmed By:Koko Oropeza MD     No valid procedures specified.   Results for orders placed during the hospital encounter of 10/26/21    Nuclear Stress - Cardiology Interpreted    Interpretation Summary    Abnormal myocardial perfusion scan.    There is a moderate to severe intensity, moderate sized, equivocal defect that is fixed in the basal to apical anterior wall(s). This finding is equivocal due to a breast shadow overlying the myocardium.    There is a moderate to severe intensity defect in the anterior wall of the left ventricle, secondary to breast attenuation.    The gated perfusion images showed an ejection fraction of 80% post stress. Normal ejection fraction is greater than 53%.    There is normal wall motion post stress.    LV cavity size is  and normal at stress.    The EKG portion of this study is negative for ischemia.    The patient reported no chest pain during the stress test.    There were no arrhythmias during stress.      PHYSICAL EXAM  CONSTITUTIONAL: Well built, well nourished in no apparent distress  NECK: no carotid bruit, no JVD  LUNGS: CTA  CHEST WALL: no tenderness  HEART: regular rate and rhythm, S1, S2 normal, soft systolic murmur aortic region   ABDOMEN: soft, non-tender; bowel sounds normal; no masses,  no organomegaly  EXTREMITIES: mild pitting edema   NEURO: AAO X 3    I HAVE REVIEWED :    The vital signs, nurses notes, and all the pertinent radiology and labs.        Current Outpatient Medications    Medication Instructions    albuterol (PROVENTIL/VENTOLIN HFA) 90 mcg/actuation inhaler 2 puffs, Inhalation, Every 4 hours PRN, Rescue     B-complex with vitamin C tablet 1 tablet, Oral, Daily    cetirizine (ZYRTEC) 10 mg, Oral, Daily PRN    co-enzyme Q-10 30 mg, Oral, Daily    FLUoxetine 20 mg, Oral, Daily    fluticasone (FLONASE) 50 mcg/actuation nasal spray 1 spray, Each Nostril, Daily PRN    ipratropium (ATROVENT) 0.03 % nasal spray 2 sprays, Nasal, Daily PRN    isosorbide mononitrate (IMDUR) 30 MG 24 hr tablet TAKE 1 TABLET BY MOUTH EVERY DAY    KRILL OIL ORAL Oral    Lactobacillus rhamnosus GG (CULTURELLE) 10 billion cell capsule 1 capsule, Oral, Daily    levomefolate calcium (DEPLIN) 15 mg, Oral, Daily    loratadine (CLARITIN) 10 mg, Oral, Daily    losartan-hydrochlorothiazide 100-25 mg (HYZAAR) 100-25 mg per tablet 1 tablet, Oral, Daily    magnesium 30 mg, Oral, Daily    mv-mn/folic acid/vit [id number] (ALIVE ONCE DAILY WOMEN 50 PLUS ORAL) 1 tablet, Oral, Daily    om 3/E/linol/ala/oleic/gla/lip (OMEGA 3-6-9 ORAL) Oral    potassium chloride (MICRO-K) 10 MEQ CpSR TAKE 1 CAPSULE BY MOUTH TWICE A DAY    potassium chloride SA (K-DUR,KLOR-CON) 10 MEQ tablet 10 mEq, Oral, 2 times daily    terazosin (HYTRIN) 2 mg, Oral, Nightly    terazosin (HYTRIN) 5 mg, Oral, Nightly    TIROSINT 88 mcg, Oral, Daily          Assessment & Plan     Statin intolerance  Need to repeat lipid panel   Also intolerant to nexletol     Obstructive sleep apnea on CPAP  Faithful to CPAP     Hypercholesteremia  Need to repeat lipid panel   Has a lot of cholesterol medication intolerances     Essential hypertension  BP ok today 136/82   Continue to monitor closely at home   COntiue on hytrin 7 mg nightly, hyzaar 10-25, imdur 30   Low Na diet     Dyspnea on exertion  NEW ONSET   ALSO WITH NEW MURMUR   OBTAIN LABS   ECHO   LEXISCAN patient cannot walk on treadmil has achilles pain           Follow up in about 2 months (around 11/15/2022).

## 2022-09-15 NOTE — ASSESSMENT & PLAN NOTE
NEW ONSET   ALSO WITH NEW MURMUR   OBTAIN LABS   ECHO   LEXISCAN patient cannot walk on treadmil has achilles pain

## 2022-09-15 NOTE — ASSESSMENT & PLAN NOTE
BP ok today 136/82   Continue to monitor closely at home   COntiue on hytrin 7 mg nightly, hyzaar 10-25, imdur 30   Low Na diet

## 2022-09-15 NOTE — TELEPHONE ENCOUNTER
----- Message from Sherry Adrian PA-C sent at 9/15/2022  3:00 PM CDT -----  All labs look really good.

## 2022-09-16 ENCOUNTER — PATIENT MESSAGE (OUTPATIENT)
Dept: CARDIOLOGY | Facility: CLINIC | Age: 77
End: 2022-09-16
Payer: MEDICARE

## 2022-09-19 ENCOUNTER — LAB VISIT (OUTPATIENT)
Dept: LAB | Facility: HOSPITAL | Age: 77
End: 2022-09-19
Payer: MEDICARE

## 2022-09-19 DIAGNOSIS — E78.00 HYPERCHOLESTEREMIA: ICD-10-CM

## 2022-09-19 LAB
CHOLEST SERPL-MCNC: 262 MG/DL (ref 120–199)
CHOLEST/HDLC SERPL: 3.5 {RATIO} (ref 2–5)
HDLC SERPL-MCNC: 74 MG/DL (ref 40–75)
HDLC SERPL: 28.2 % (ref 20–50)
LDLC SERPL CALC-MCNC: 174.4 MG/DL (ref 63–159)
NONHDLC SERPL-MCNC: 188 MG/DL
TRIGL SERPL-MCNC: 68 MG/DL (ref 30–150)

## 2022-09-19 PROCEDURE — 36415 COLL VENOUS BLD VENIPUNCTURE: CPT

## 2022-09-19 PROCEDURE — 80061 LIPID PANEL: CPT

## 2022-09-21 ENCOUNTER — TELEPHONE (OUTPATIENT)
Dept: CARDIOLOGY | Facility: CLINIC | Age: 77
End: 2022-09-21
Payer: MEDICARE

## 2022-09-21 ENCOUNTER — TELEPHONE (OUTPATIENT)
Dept: CARDIOLOGY | Facility: HOSPITAL | Age: 77
End: 2022-09-21

## 2022-09-21 NOTE — TELEPHONE ENCOUNTER
Spoke with patient regarding her stress and echo testing.  She reviewed the instructions and saw that she would need a .    Patient advised that if she usually drives herself, she will be able to drive after testing tomorrow.    Patient elected to keep appt.

## 2022-09-21 NOTE — TELEPHONE ENCOUNTER
----- Message from Quinn Early sent at 9/21/2022  9:04 AM CDT -----  Type: Needs Medical Advice  Who Called: Pt   Symptoms (please be specific):   How long has patient had these symptoms:    Pharmacy name and phone #:    Best Call Back Number: [phone]  Additional Information: Pt requesting a call back to possibly reschedule her test to a Friday.

## 2022-09-21 NOTE — TELEPHONE ENCOUNTER
Patient advised, test will be [facility] [facility]   Will need to register on the first floor at the main entrance.   Patient advised that arrival time is 6:30am.  Patient advised that she may be here about 3.5-4 hours, and may want to bring something to occupy their time, as there will be periods of waiting.    Patient advised, may take her medications prior to testing if you need to.  Patient should HOLD Isosorbide. Advised if she needs to eat to take her medications, please keep it light, like toast and juice.    Patient advised to avoid all caffeine 12 hours prior to testing.  This includes decaf tea and coffee.    Will provide peanut butter crackers for a snack after stress test.  If patient would prefer something else, please bring a snack from home.    Wear comfortable clothing.   No lotions, oils, or powders to the upper chest area. May wear deodorant.    No metal jewelry, buttons, or zippers to the upper body.  Patient verbalizes understanding of instructions.

## 2022-09-22 ENCOUNTER — HOSPITAL ENCOUNTER (OUTPATIENT)
Dept: RADIOLOGY | Facility: HOSPITAL | Age: 77
Discharge: HOME | End: 2022-09-22
Payer: MEDICARE

## 2022-09-22 ENCOUNTER — HOSPITAL ENCOUNTER (OUTPATIENT)
Dept: CARDIOLOGY | Facility: HOSPITAL | Age: 77
Discharge: HOME | End: 2022-09-22
Payer: MEDICARE

## 2022-09-22 VITALS — BODY MASS INDEX: 34.17 KG/M2 | WEIGHT: 181 LBS | HEIGHT: 61 IN

## 2022-09-22 DIAGNOSIS — R06.09 DYSPNEA ON EXERTION: ICD-10-CM

## 2022-09-22 DIAGNOSIS — R94.31 ABNORMAL ELECTROCARDIOGRAM (ECG) (EKG): ICD-10-CM

## 2022-09-22 PROCEDURE — 93018 CV STRESS TEST I&R ONLY: CPT | Mod: ,,, | Performed by: INTERNAL MEDICINE

## 2022-09-22 PROCEDURE — A9502 TC99M TETROFOSMIN: HCPCS

## 2022-09-22 PROCEDURE — 93016 CV STRESS TEST SUPVJ ONLY: CPT | Mod: ,,, | Performed by: NURSE PRACTITIONER

## 2022-09-22 PROCEDURE — 93306 ECHO (CUPID ONLY): ICD-10-PCS | Mod: 26,,, | Performed by: SPECIALIST

## 2022-09-22 PROCEDURE — 93306 TTE W/DOPPLER COMPLETE: CPT | Mod: 26,,, | Performed by: SPECIALIST

## 2022-09-22 PROCEDURE — 93016 STRESS TEST WITH MYOCARDIAL PERFUSION (CUPID ONLY): ICD-10-PCS | Mod: ,,, | Performed by: NURSE PRACTITIONER

## 2022-09-22 PROCEDURE — 93306 TTE W/DOPPLER COMPLETE: CPT

## 2022-09-22 PROCEDURE — 93018 STRESS TEST WITH MYOCARDIAL PERFUSION (CUPID ONLY): ICD-10-PCS | Mod: ,,, | Performed by: INTERNAL MEDICINE

## 2022-09-22 PROCEDURE — 78452 HT MUSCLE IMAGE SPECT MULT: CPT | Mod: 26,,, | Performed by: INTERNAL MEDICINE

## 2022-09-22 PROCEDURE — 78452 STRESS TEST WITH MYOCARDIAL PERFUSION (CUPID ONLY): ICD-10-PCS | Mod: 26,,, | Performed by: INTERNAL MEDICINE

## 2022-09-22 RX ORDER — REGADENOSON 0.08 MG/ML
0.4 INJECTION, SOLUTION INTRAVENOUS ONCE
Status: COMPLETED | OUTPATIENT
Start: 2022-09-22 | End: 2022-09-22

## 2022-09-22 RX ADMIN — REGADENOSON 0.4 MG: 0.08 INJECTION, SOLUTION INTRAVENOUS at 09:09

## 2022-09-27 ENCOUNTER — PATIENT MESSAGE (OUTPATIENT)
Dept: CARDIOLOGY | Facility: CLINIC | Age: 77
End: 2022-09-27
Payer: MEDICARE

## 2022-10-09 LAB
CV PHARM DOSE: 0.4 MG
CV STRESS BASE HR: 55 BPM
DIASTOLIC BLOOD PRESSURE: 80 MMHG
EJECTION FRACTION- HIGH: 59 %
END DIASTOLIC INDEX-HIGH: 155 ML/M2
END DIASTOLIC INDEX-LOW: 91 ML/M2
END SYSTOLIC INDEX-HIGH: 78 ML/M2
END SYSTOLIC INDEX-LOW: 40 ML/M2
NUC STRESS DIASTOLIC VOLUME INDEX: 70
NUC STRESS EJECTION FRACTION: 66 %
NUC STRESS SYSTOLIC VOLUME INDEX: 24
OHS CV CPX 1 MINUTE RECOVERY HEART RATE: 81 BPM
OHS CV CPX 85 PERCENT MAX PREDICTED HEART RATE MALE: 118
OHS CV CPX MAX PREDICTED HEART RATE: 139
OHS CV CPX PATIENT IS FEMALE: 1
OHS CV CPX PATIENT IS MALE: 0
OHS CV CPX PEAK DIASTOLIC BLOOD PRESSURE: 80 MMHG
OHS CV CPX PEAK HEAR RATE: 81 BPM
OHS CV CPX PEAK RATE PRESSURE PRODUCT: NORMAL
OHS CV CPX PEAK SYSTOLIC BLOOD PRESSURE: 168 MMHG
OHS CV CPX PERCENT MAX PREDICTED HEART RATE ACHIEVED: 58
OHS CV CPX RATE PRESSURE PRODUCT PRESENTING: 8140
RETIRED EF AND QEF - SEE NOTES: 47 %
SYSTOLIC BLOOD PRESSURE: 148 MMHG

## 2022-10-22 LAB
AORTIC VALVE CUSP SEPERATION: 1.4 CM
AV INDEX (PROSTH): 0.59
AV MEAN GRADIENT: 16 MMHG
AV PEAK GRADIENT: 16 MMHG
AV VALVE AREA: 2.04 CM2
AV VELOCITY RATIO: 0.57
BSA FOR ECHO PROCEDURE: 1.88 M2
CV ECHO LV RWT: 0.75 CM
DOP CALC AO PEAK VEL: 2.03 M/S
DOP CALC AO VTI: 53.7 CM
DOP CALC LVOT AREA: 3.5 CM2
DOP CALC LVOT DIAMETER: 2.1 CM
DOP CALC LVOT PEAK VEL: 1.15 M/S
DOP CALC LVOT STROKE VOLUME: 109.74 CM3
DOP CALCLVOT PEAK VEL VTI: 31.7 CM
E WAVE DECELERATION TIME: 264 MS
ECHO LV POSTERIOR WALL: 1.54 CM (ref 0.6–1.1)
EJECTION FRACTION: 70 %
FRACTIONAL SHORTENING: 40 % (ref 28–44)
INTERVENTRICULAR SEPTUM: 1.34 CM (ref 0.6–1.1)
IVRT: 79 MS
LEFT ATRIUM SIZE: 3.2 CM
LEFT INTERNAL DIMENSION IN SYSTOLE: 2.47 CM (ref 2.1–4)
LEFT VENTRICLE MASS INDEX: 125 G/M2
LEFT VENTRICULAR INTERNAL DIMENSION IN DIASTOLE: 4.09 CM (ref 3.5–6)
LEFT VENTRICULAR MASS: 225.4 G
MV PEAK A VEL: 0.91 M/S
MV STENOSIS PRESSURE HALF TIME: 48 MS
MV VALVE AREA P 1/2 METHOD: 4.58 CM2
PISA TR MAX VEL: 2.52 M/S
RA PRESSURE: 3 MMHG
RIGHT VENTRICULAR END-DIASTOLIC DIMENSION: 2.6 CM
TDI LATERAL: 0.07 M/S
TDI SEPTAL: 0.08 M/S
TDI: 0.08 M/S
TR MAX PG: 25 MMHG
TV REST PULMONARY ARTERY PRESSURE: 28 MMHG

## 2022-11-15 ENCOUNTER — OFFICE VISIT (OUTPATIENT)
Dept: CARDIOLOGY | Facility: CLINIC | Age: 77
End: 2022-11-15
Payer: MEDICARE

## 2022-11-15 VITALS
HEART RATE: 76 BPM | BODY MASS INDEX: 35.12 KG/M2 | WEIGHT: 186 LBS | HEIGHT: 61 IN | SYSTOLIC BLOOD PRESSURE: 132 MMHG | DIASTOLIC BLOOD PRESSURE: 60 MMHG

## 2022-11-15 DIAGNOSIS — R73.9 HYPERGLYCEMIA: ICD-10-CM

## 2022-11-15 DIAGNOSIS — Z78.9 STATIN INTOLERANCE: ICD-10-CM

## 2022-11-15 DIAGNOSIS — R06.09 DYSPNEA ON EXERTION: Primary | ICD-10-CM

## 2022-11-15 DIAGNOSIS — R09.89 BILATERAL CAROTID BRUITS: ICD-10-CM

## 2022-11-15 DIAGNOSIS — E78.00 HYPERCHOLESTEREMIA: ICD-10-CM

## 2022-11-15 DIAGNOSIS — R94.31 ABNORMAL ELECTROCARDIOGRAM (ECG) (EKG): ICD-10-CM

## 2022-11-15 DIAGNOSIS — I10 ESSENTIAL HYPERTENSION: ICD-10-CM

## 2022-11-15 PROCEDURE — 99214 OFFICE O/P EST MOD 30 MIN: CPT | Mod: S$PBB,,, | Performed by: NURSE PRACTITIONER

## 2022-11-15 PROCEDURE — 99214 OFFICE O/P EST MOD 30 MIN: CPT | Mod: PBBFAC,PN | Performed by: NURSE PRACTITIONER

## 2022-11-15 PROCEDURE — 99999 PR PBB SHADOW E&M-EST. PATIENT-LVL IV: ICD-10-PCS | Mod: PBBFAC,,, | Performed by: NURSE PRACTITIONER

## 2022-11-15 PROCEDURE — 99214 PR OFFICE/OUTPT VISIT, EST, LEVL IV, 30-39 MIN: ICD-10-PCS | Mod: S$PBB,,, | Performed by: NURSE PRACTITIONER

## 2022-11-15 PROCEDURE — 99999 PR PBB SHADOW E&M-EST. PATIENT-LVL IV: CPT | Mod: PBBFAC,,, | Performed by: NURSE PRACTITIONER

## 2022-11-15 RX ORDER — METFORMIN HYDROCHLORIDE 500 MG/1
500 TABLET ORAL 2 TIMES DAILY WITH MEALS
COMMUNITY
End: 2023-10-17 | Stop reason: SDUPTHER

## 2022-11-15 RX ORDER — EZETIMIBE 10 MG/1
10 TABLET ORAL DAILY
COMMUNITY
End: 2023-09-20

## 2022-11-15 NOTE — PROGRESS NOTES
Subjective:    Patient ID:  Iram Aguirre is a 77 y.o. female patient here for evaluation Results (STRESS/ECHO) and Hypertension      History of Present Illness:         Ms. Shell is here for her follow up visit. She recently started seeing new PCP. Started on inhaler, zetia, and metformin. Doing well on all. Denies CP. SOB Improved with inhaler.     Review of patient's allergies indicates:   Allergen Reactions    Cortisone Shortness Of Breath     Causes heart to race    Hair formula [mv,iron,min-folic acid-biotin]      dye    Levaquin [levofloxacin] Other (See Comments)     Tongue swelling and blisters    Codeine Nausea Only    Doxycycline      Other reaction(s): Unknown       Past Medical History:   Diagnosis Date    AR (allergic rhinitis) 11/29/2012    Benign hypertension     Depression     Diverticulosis 2005    Esophageal polyp     Fibromyalgia     Gastric ulcer; benign 2007    GERD (gastroesophageal reflux disease) 11/29/2012    Hypercholesteremia     Hypothyroidism     Kidney stones     MTHFR gene mutation     per daughter E72.8    Obstructive sleep apnea on CPAP     Osteopenia     Rectal polyp     Skin cancer     pre skin cancer skin    Vulvovaginal melanosis      Past Surgical History:   Procedure Laterality Date    CHOLECYSTECTOMY  08/08/2012    CHOLECYSTECTOMY      COLONOSCOPY N/A 2/6/2017    Procedure: COLONOSCOPY;  Surgeon: Arturo Crane MD;  Location: [facility];  Service: Endoscopy;  Laterality: N/A;    FOOT FRACTURE SURGERY  2005    plate in right foot    GANGLION CYST EXCISION  1980    left wrist    HYSTERECTOMY      KNEE ARTHROSCOPY W/ ACL RECONSTRUCTION      bilateral     NASAL SEPTUM SURGERY      TEAR DUCT SURGERY  2006    left eye    TOTAL KNEE ARTHROPLASTY      bilateral    TOTAL VAGINAL HYSTERECTOMY  age 31    w/BSO    TUBAL LIGATION  age 26     Social History     Tobacco Use    Smoking status: Never    Smokeless tobacco: Never   Substance Use Topics    Alcohol use: No    Drug use: No         Review of Systems:    As noted in HPI in addition      REVIEW OF SYSTEMS  CARDIOVASCULAR: No recent chest pain, palpitations, arm, neck, or jaw pain  RESPIRATORY: No recent fever, cough chills, SOB or congestion  : No blood in the urine  GI: No Nausea, vomiting, constipation, diarrhea, blood, or reflux.  MUSCULOSKELETAL: No myalgias  NEURO: No lightheadedness or dizziness  EYES: No Double vision, blurry, vision or headache              Objective        Vitals:    11/15/22 1023   BP: 132/60   Pulse: 76       LIPIDS - LAST 2   Lab Results   Component Value Date    CHOL 262 (H) 09/19/2022    CHOL 224 (H) 11/08/2019    HDL 74 09/19/2022    HDL 56 11/08/2019    LDLCALC 174.4 (H) 09/19/2022    LDLCALC 145.0 11/08/2019    TRIG 68 09/19/2022    TRIG 115 11/08/2019    CHOLHDL 28.2 09/19/2022    CHOLHDL 25.0 11/08/2019       CBC - LAST 2  Lab Results   Component Value Date    WBC 6.07 09/15/2022    WBC 8.18 03/24/2021    RBC 3.86 (L) 09/15/2022    RBC 3.97 (L) 03/24/2021    HGB 12.4 09/15/2022    HGB 12.1 03/24/2021    HCT 37.8 09/15/2022    HCT 38.1 03/24/2021    MCV 98 09/15/2022    MCV 96 03/24/2021    MCH 32.1 (H) 09/15/2022    MCH 30.5 03/24/2021    MCHC 32.8 09/15/2022    MCHC 31.8 (L) 03/24/2021    RDW 13.5 09/15/2022    RDW 12.7 03/24/2021     09/15/2022     (H) 03/24/2021    MPV 9.8 09/15/2022    MPV 9.5 03/24/2021    GRAN 4.9 03/24/2021    GRAN 60.0 03/24/2021    LYMPH 2.2 03/24/2021    LYMPH 27.4 03/24/2021    MONO 0.8 03/24/2021    MONO 9.5 03/24/2021    BASO 0.05 03/24/2021    BASO 0.03 11/07/2019    NRBC 0 03/24/2021    NRBC 0 11/07/2019       CHEMISTRY & LIVER FUNCTION - LAST 2  Lab Results   Component Value Date     09/15/2022     12/30/2021    K 3.6 09/15/2022    K 3.8 12/30/2021     09/15/2022     (H) 12/30/2021    CO2 30 (H) 09/15/2022    CO2 26 12/30/2021    ANIONGAP 7 (L) 09/15/2022    ANIONGAP 9 12/06/2021    BUN 17 09/15/2022    BUN 12 12/30/2021     CREATININE 0.9 09/15/2022    CREATININE 0.82 12/30/2021     09/15/2022     12/06/2021    CALCIUM 9.9 09/15/2022    CALCIUM 9.2 12/30/2021    MG 1.8 11/08/2019    ALBUMIN 3.9 09/15/2022    ALBUMIN 3.4 12/30/2021    PROT 7.4 09/15/2022    PROT 7.7 03/24/2021    ALKPHOS 67 09/15/2022    ALKPHOS 101 03/24/2021    ALT 21 09/15/2022    ALT 33 03/24/2021    AST 26 09/15/2022    AST 27 03/24/2021    BILITOT 0.8 09/15/2022    BILITOT 0.5 03/24/2021        CARDIAC PROFILE - LAST 2  Lab Results   Component Value Date    BNP 10 09/15/2022    BNP 34 03/24/2021    CPK 68 03/24/2021     03/24/2021    TROPONINI <0.030 03/24/2021    TROPONINI <0.030 11/08/2019        COAGULATION - LAST 2  Lab Results   Component Value Date    LABPT 13.8 11/07/2019    INR 1.1 11/07/2019       ENDOCRINE & PSA - LAST 2  Lab Results   Component Value Date    HGBA1C 5.9 11/15/2021    HGBA1C 5.9 11/08/2019    TSH 1.620 09/15/2022    TSH 2.517 12/06/2021    PROCAL <0.05 03/24/2021        ECHOCARDIOGRAM RESULTS  Results for orders placed during the hospital encounter of 09/22/22    Echo    Interpretation Summary  · Mild concentric hypertrophy and normal systolic function.  · The estimated ejection fraction is 70%.  · Normal left ventricular diastolic function.  · Atrial fibrillation not observed.  · Normal right ventricular size with normal right ventricular systolic function.  · There is mild-to-moderate aortic valve stenosis. Calculated aortic valve area is 1.56  · Aortic valve area is 1.56cm2; peak velocity is 2.03 m/s; mean gradient is 16 mmHg.  · Normal central venous pressure (3 mmHg).  · The estimated PA systolic pressure is 28 mmHg.      CURRENT/PREVIOUS VISIT EKG  Results for orders placed or performed during the hospital encounter of 09/20/21   EKG 12-lead    Collection Time: 09/20/21 11:18 AM    Narrative    Test Reason : R07.9,    Vent. Rate : 069 BPM     Atrial Rate : 069 BPM     P-R Int : 160 ms          QRS Dur : 126 ms       QT Int : 430 ms       P-R-T Axes : 067 065 042 degrees     QTc Int : 460 ms    Normal sinus rhythm  Right bundle branch block  Abnormal ECG  When compared with ECG of 24-MAR-2021 18:13,  No significant change was found  Confirmed by Koko Oropeza MD ([id number]) on 9/22/2021 10:50:37 PM    Referred By: JENNA   SELF           Confirmed By:Koko Oropeza MD     No valid procedures specified.   Results for orders placed during the hospital encounter of 09/22/22    Nuclear Stress - Cardiology Interpreted    Interpretation Summary    Normal myocardial perfusion scan. There is no evidence of myocardial ischemia or infarction.    There is a mild intensity perfusion abnormality in the  wall of the left ventricle, secondary to breast attenuation.    The gated perfusion images showed an ejection fraction of 66% post stress. Normal ejection fraction is greater than 47%.    There is normal wall motion post stress.    LV cavity size is  and normal at stress.    The EKG portion of this study is negative for ischemia.    The patient reported no chest pain during the stress test.    There were no arrhythmias during stress.      PHYSICAL EXAM  CONSTITUTIONAL: Well built, well nourished in no apparent distress  NECK: bilateral bruit R>L  LUNGS: CTA  CHEST WALL: no tenderness  HEART: regular rate and rhythm, S1, S2 normal, Murmur present  ABDOMEN: soft, non-tender; bowel sounds normal; no masses,  no organomegaly  EXTREMITIES: Extremities normal, no edema, no calf tenderness noted  NEURO: AAO X 3    I HAVE REVIEWED :    The vital signs, nurses notes, and all the pertinent radiology and labs.        Current Outpatient Medications   Medication Instructions    albuterol (PROVENTIL/VENTOLIN HFA) 90 mcg/actuation inhaler 2 puffs, Inhalation, Every 4 hours PRN, Rescue     co-enzyme Q-10 30 mg, Oral, Daily    ezetimibe (ZETIA) 10 mg, Oral, Daily    FLUoxetine 20 mg, Oral, Daily    fluticasone (FLONASE) 50 mcg/actuation nasal spray 1 spray,  Each Nostril, Daily PRN    ipratropium (ATROVENT) 0.03 % nasal spray 2 sprays, Nasal, Daily PRN    isosorbide mononitrate (IMDUR) 30 MG 24 hr tablet TAKE 1 TABLET BY MOUTH EVERY DAY    KRILL OIL ORAL Oral    Lactobacillus rhamnosus GG (CULTURELLE) 10 billion cell capsule 1 capsule, Oral, Daily    levomefolate calcium (DEPLIN) 15 mg, Oral, Daily    loratadine (CLARITIN) 10 mg, Oral, Daily    losartan-hydrochlorothiazide 100-25 mg (HYZAAR) 100-25 mg per tablet 1 tablet, Oral, Daily    magnesium 30 mg, Oral, Daily    metFORMIN (GLUCOPHAGE) 500 mg, Oral, 2 times daily with meals    mv-mn/folic acid/vit [id number] (ALIVE ONCE DAILY WOMEN 50 PLUS ORAL) 1 tablet, Oral, Daily    om 3/E/linol/ala/oleic/gla/lip (OMEGA 3-6-9 ORAL) Oral    potassium chloride (MICRO-K) 10 MEQ CpSR TAKE 1 CAPSULE BY MOUTH TWICE A DAY    potassium chloride SA (K-DUR,KLOR-CON) 10 MEQ tablet 10 mEq, Oral, 2 times daily    terazosin (HYTRIN) 2 mg, Oral, Nightly    terazosin (HYTRIN) 5 mg, Oral, Nightly    TIROSINT 88 mcg, Oral, Daily          Assessment & Plan     Essential hypertension  BP is stable.   Continue current regimen.    Hypercholesteremia  Zetia 10 mg daily has been started by her PCP.    Statin intolerance  Statin Intolerant   PCP Started Zetia 10 mg daily      Dyspnea on exertion  Improved with Albuterol PRN    Hyperglycemia  Now on metformin per PCP    Abnormal electrocardiogram (ECG) (EKG)  Stress is negative for RI  No angina           No follow-ups on file.

## 2022-11-17 ENCOUNTER — HOSPITAL ENCOUNTER (OUTPATIENT)
Dept: RADIOLOGY | Facility: HOSPITAL | Age: 77
Discharge: HOME | End: 2022-11-17
Attending: NURSE PRACTITIONER
Payer: MEDICARE

## 2022-11-17 DIAGNOSIS — Z78.9 STATIN INTOLERANCE: ICD-10-CM

## 2022-11-17 DIAGNOSIS — R09.89 BILATERAL CAROTID BRUITS: ICD-10-CM

## 2022-11-17 PROCEDURE — 93880 US CAROTID BILATERAL: ICD-10-PCS | Mod: 26,,, | Performed by: RADIOLOGY

## 2022-11-17 PROCEDURE — 93880 EXTRACRANIAL BILAT STUDY: CPT | Mod: 26,,, | Performed by: RADIOLOGY

## 2022-11-17 PROCEDURE — 93880 EXTRACRANIAL BILAT STUDY: CPT | Mod: TC

## 2022-11-22 ENCOUNTER — TELEPHONE (OUTPATIENT)
Dept: CARDIOLOGY | Facility: CLINIC | Age: 77
End: 2022-11-22
Payer: MEDICARE

## 2022-11-22 NOTE — TELEPHONE ENCOUNTER
----- Message from Ana Camejo MA sent at 11/18/2022  4:56 PM CST -----    ----- Message -----  From: Carmela Santos NP  Sent: 11/18/2022   3:44 PM CST  To: Tana Burnett RN, Dante Hinton MA, #    negative

## 2023-02-16 RX ORDER — TERAZOSIN 5 MG/1
5 CAPSULE ORAL NIGHTLY
Qty: 90 CAPSULE | Refills: 3 | Status: SHIPPED | OUTPATIENT
Start: 2023-02-16 | End: 2023-02-20 | Stop reason: SDUPTHER

## 2023-02-16 NOTE — TELEPHONE ENCOUNTER
----- Message from Stan Laughlin sent at 2/16/2023 10:07 AM CST -----  Contact: pt at  [phone]  Type:  RX Refill Request    Who Called:  pt   Refill or New Rx:  REFILL  RX Name and Strength:  terazosin (HYTRIN) 5 MG capsule  How is the patient currently taking it? (ex. 1XDay):  as directed  Is this a 30 day or 90 day RX:  90  Preferred Pharmacy with phone number:    [facility]/pharmacy #[id number] - [place] - [address]  [address] [place] [address]  Phone: [phone] Fax: [phone]  Local or Mail Order:  local  Ordering Provider:  Tom Bingham Call Back Number:  [phone]  Additional Information:  Please call back to advise.

## 2023-02-17 NOTE — TELEPHONE ENCOUNTER
----- Message from Celina Ivy sent at 2/17/2023  4:11 PM CST -----  Contact: [phone]  Type:  RX Refill Request    Who Called:  Pt   Refill or New Rx:  refill  RX Name and Strength:  terazosin (HYTRIN) 2 MG capsule  How is the patient currently taking it? (ex. 1XDay):  1xday   Is this a 30 day or 90 day RX:  90  Preferred Pharmacy with phone number:    [facility]/pharmacy #[id number] - [place], [place] - [address]  [address] [place] [address]  Phone: [phone] Fax: [phone]     Local or Mail Order:  Local  Ordering Provider: raphael Bingham Call Back Number:  [phone] (home)     Additional Information:  Pt stated she got her 5mg but not her 2mg .

## 2023-02-20 RX ORDER — TERAZOSIN 5 MG/1
5 CAPSULE ORAL NIGHTLY
Qty: 90 CAPSULE | Refills: 3 | Status: SHIPPED | OUTPATIENT
Start: 2023-02-20 | End: 2023-10-17 | Stop reason: SDUPTHER

## 2023-03-14 ENCOUNTER — OFFICE VISIT (OUTPATIENT)
Dept: CARDIOLOGY | Facility: CLINIC | Age: 78
End: 2023-03-14
Payer: MEDICARE

## 2023-03-14 VITALS
OXYGEN SATURATION: 97 % | WEIGHT: 178 LBS | SYSTOLIC BLOOD PRESSURE: 144 MMHG | HEIGHT: 61 IN | HEART RATE: 79 BPM | BODY MASS INDEX: 33.61 KG/M2 | DIASTOLIC BLOOD PRESSURE: 60 MMHG

## 2023-03-14 DIAGNOSIS — R06.02 SOB (SHORTNESS OF BREATH): Primary | ICD-10-CM

## 2023-03-14 DIAGNOSIS — I10 ESSENTIAL HYPERTENSION: ICD-10-CM

## 2023-03-14 DIAGNOSIS — R73.9 HYPERGLYCEMIA: ICD-10-CM

## 2023-03-14 DIAGNOSIS — Z78.9 STATIN INTOLERANCE: ICD-10-CM

## 2023-03-14 DIAGNOSIS — R06.09 DYSPNEA ON EXERTION: ICD-10-CM

## 2023-03-14 DIAGNOSIS — E78.00 HYPERCHOLESTEREMIA: ICD-10-CM

## 2023-03-14 PROCEDURE — 99214 PR OFFICE/OUTPT VISIT, EST, LEVL IV, 30-39 MIN: ICD-10-PCS | Mod: S$PBB,,, | Performed by: NURSE PRACTITIONER

## 2023-03-14 PROCEDURE — 99214 OFFICE O/P EST MOD 30 MIN: CPT | Mod: PBBFAC,PN | Performed by: NURSE PRACTITIONER

## 2023-03-14 PROCEDURE — 99214 OFFICE O/P EST MOD 30 MIN: CPT | Mod: S$PBB,,, | Performed by: NURSE PRACTITIONER

## 2023-03-14 PROCEDURE — 99999 PR PBB SHADOW E&M-EST. PATIENT-LVL IV: ICD-10-PCS | Mod: PBBFAC,,, | Performed by: NURSE PRACTITIONER

## 2023-03-14 PROCEDURE — 99999 PR PBB SHADOW E&M-EST. PATIENT-LVL IV: CPT | Mod: PBBFAC,,, | Performed by: NURSE PRACTITIONER

## 2023-03-14 RX ORDER — METRONIDAZOLE 500 MG/1
500 TABLET ORAL
COMMUNITY
Start: 2023-03-13 | End: 2023-03-20

## 2023-03-14 RX ORDER — CEFUROXIME AXETIL 500 MG/1
500 TABLET ORAL
COMMUNITY
Start: 2023-03-13 | End: 2023-03-20

## 2023-03-14 NOTE — PROGRESS NOTES
Subjective:    Patient ID:  Iram Aguirre is a 77 y.o. female patient here for evaluation Follow-up      History of Present Illness:       Patient is here for follow-up visit  Denies chest pain.  Some SOB from deconditioning and weight gain.  Denies recent fever cough chills or congestion.  Denies blood in the urine or blood in the stool.  Denies myalgias  Denies orthopnea or peripheral edema  Denies nausea vomiting or dyspepsia  No recent arm neck or jaw pain.    No lightheadedness or dizziness  Recent diverticulitis flair up.      Review of patient's allergies indicates:   Allergen Reactions    Cortisone Shortness Of Breath     Causes heart to race    Hair formula [mv,iron,min-folic acid-biotin]      dye    Levaquin [levofloxacin] Other (See Comments)     Tongue swelling and blisters    Codeine Nausea Only    Doxycycline      Other reaction(s): Unknown       Past Medical History:   Diagnosis Date    AR (allergic rhinitis) 11/29/2012    Benign hypertension     Depression     Diverticulosis 2005    Esophageal polyp     Fibromyalgia     Gastric ulcer; benign 2007    GERD (gastroesophageal reflux disease) 11/29/2012    Hypercholesteremia     Hypothyroidism     Kidney stones     MTHFR gene mutation     per daughter E72.8    Obstructive sleep apnea on CPAP     Osteopenia     Rectal polyp     Skin cancer     pre skin cancer skin    Vulvovaginal melanosis      Past Surgical History:   Procedure Laterality Date    CHOLECYSTECTOMY  08/08/2012    CHOLECYSTECTOMY      COLONOSCOPY N/A 2/6/2017    Procedure: COLONOSCOPY;  Surgeon: Arturo Crane MD;  Location: [facility];  Service: Endoscopy;  Laterality: N/A;    FOOT FRACTURE SURGERY  2005    plate in right foot    GANGLION CYST EXCISION  1980    left wrist    HYSTERECTOMY      KNEE ARTHROSCOPY W/ ACL RECONSTRUCTION      bilateral     NASAL SEPTUM SURGERY      TEAR DUCT SURGERY  2006    left eye    TOTAL KNEE ARTHROPLASTY      bilateral    TOTAL VAGINAL HYSTERECTOMY   age 31    w/BSO    TUBAL LIGATION  age 26     Social History     Tobacco Use    Smoking status: Never    Smokeless tobacco: Never   Substance Use Topics    Alcohol use: No    Drug use: No        Review of Systems:    As noted in HPI in addition                   Objective        Vitals:    03/14/23 1040   BP: (!) 144/60   Pulse: 79       LIPIDS - LAST 2   Lab Results   Component Value Date    CHOL 262 (H) 09/19/2022    CHOL 224 (H) 11/08/2019    HDL 74 09/19/2022    HDL 56 11/08/2019    LDLCALC 174.4 (H) 09/19/2022    LDLCALC 145.0 11/08/2019    TRIG 68 09/19/2022    TRIG 115 11/08/2019    CHOLHDL 28.2 09/19/2022    CHOLHDL 25.0 11/08/2019       CBC - LAST 2  Lab Results   Component Value Date    WBC 6.07 09/15/2022    WBC 8.18 03/24/2021    RBC 3.86 (L) 09/15/2022    RBC 3.97 (L) 03/24/2021    HGB 12.4 09/15/2022    HGB 12.1 03/24/2021    HCT 37.8 09/15/2022    HCT 38.1 03/24/2021    MCV 98 09/15/2022    MCV 96 03/24/2021    MCH 32.1 (H) 09/15/2022    MCH 30.5 03/24/2021    MCHC 32.8 09/15/2022    MCHC 31.8 (L) 03/24/2021    RDW 13.5 09/15/2022    RDW 12.7 03/24/2021     09/15/2022     (H) 03/24/2021    MPV 9.8 09/15/2022    MPV 9.5 03/24/2021    GRAN 4.9 03/24/2021    GRAN 60.0 03/24/2021    LYMPH 2.2 03/24/2021    LYMPH 27.4 03/24/2021    MONO 0.8 03/24/2021    MONO 9.5 03/24/2021    BASO 0.05 03/24/2021    BASO 0.03 11/07/2019    NRBC 0 03/24/2021    NRBC 0 11/07/2019       CHEMISTRY & LIVER FUNCTION - LAST 2  Lab Results   Component Value Date     09/15/2022     12/30/2021    K 3.6 09/15/2022    K 3.8 12/30/2021     09/15/2022     (H) 12/30/2021    CO2 30 (H) 09/15/2022    CO2 26 12/30/2021    ANIONGAP 7 (L) 09/15/2022    ANIONGAP 9 12/06/2021    BUN 17 09/15/2022    BUN 12 12/30/2021    CREATININE 0.9 09/15/2022    CREATININE 0.82 12/30/2021     09/15/2022     12/06/2021    CALCIUM 9.9 09/15/2022    CALCIUM 9.2 12/30/2021    MG 1.8 11/08/2019    ALBUMIN 3.9  09/15/2022    ALBUMIN 3.4 12/30/2021    PROT 7.4 09/15/2022    PROT 7.7 03/24/2021    ALKPHOS 67 09/15/2022    ALKPHOS 101 03/24/2021    ALT 21 09/15/2022    ALT 33 03/24/2021    AST 26 09/15/2022    AST 27 03/24/2021    BILITOT 0.8 09/15/2022    BILITOT 0.5 03/24/2021        CARDIAC PROFILE - LAST 2  Lab Results   Component Value Date    BNP 10 09/15/2022    BNP 34 03/24/2021    CPK 68 03/24/2021     03/24/2021    TROPONINI <0.030 03/24/2021    TROPONINI <0.030 11/08/2019        COAGULATION - LAST 2  Lab Results   Component Value Date    LABPT 13.8 11/07/2019    INR 1.2 09/20/2021    INR 1.1 11/07/2019       ENDOCRINE & PSA - LAST 2  Lab Results   Component Value Date    HGBA1C 5.9 11/15/2021    HGBA1C 5.9 11/08/2019    TSH 1.620 09/15/2022    TSH 2.517 12/06/2021    PROCAL <0.05 03/24/2021        ECHOCARDIOGRAM RESULTS  Results for orders placed during the hospital encounter of 09/22/22    Echo    Interpretation Summary  · Mild concentric hypertrophy and normal systolic function.  · The estimated ejection fraction is 70%.  · Normal left ventricular diastolic function.  · Atrial fibrillation not observed.  · Normal right ventricular size with normal right ventricular systolic function.  · There is mild-to-moderate aortic valve stenosis. Calculated aortic valve area is 1.56  · Aortic valve area is 1.56cm2; peak velocity is 2.03 m/s; mean gradient is 16 mmHg.  · Normal central venous pressure (3 mmHg).  · The estimated PA systolic pressure is 28 mmHg.      CURRENT/PREVIOUS VISIT EKG  Results for orders placed or performed during the hospital encounter of 09/20/21   EKG 12-lead    Collection Time: 09/20/21 11:18 AM    Narrative    Test Reason : R07.9,    Vent. Rate : 069 BPM     Atrial Rate : 069 BPM     P-R Int : 160 ms          QRS Dur : 126 ms      QT Int : 430 ms       P-R-T Axes : 067 065 042 degrees     QTc Int : 460 ms    Normal sinus rhythm  Right bundle branch block  Abnormal ECG  When compared with  ECG of 24-MAR-2021 18:13,  No significant change was found  Confirmed by Sandip CRUZ, Koko CAVAZOS ([id number]) on 9/22/2021 10:50:37 PM    Referred By: JENNA   SELF           Confirmed By:Koko Oropeza MD     No valid procedures specified.   Results for orders placed during the hospital encounter of 09/22/22    Nuclear Stress - Cardiology Interpreted    Interpretation Summary    Normal myocardial perfusion scan. There is no evidence of myocardial ischemia or infarction.    There is a mild intensity perfusion abnormality in the  wall of the left ventricle, secondary to breast attenuation.    The gated perfusion images showed an ejection fraction of 66% post stress. Normal ejection fraction is greater than 47%.    There is normal wall motion post stress.    LV cavity size is  and normal at stress.    The EKG portion of this study is negative for ischemia.    The patient reported no chest pain during the stress test.    There were no arrhythmias during stress.        PHYSICAL EXAM  CONSTITUTIONAL: Well built, well nourished in no apparent distress  NECK: no carotid bruit, no JVD  LUNGS: CTA  CHEST WALL: no tenderness  HEART: regular rate and rhythm, S1, S2 normal, systolic murmur noted.   ABDOMEN: soft, non-tender; bowel sounds normal; no masses,  no organomegaly  EXTREMITIES: Extremities normal, no edema, no calf tenderness noted  NEURO: AAO X 3    I HAVE REVIEWED :    The vital signs, nurses notes, and all the pertinent radiology and labs.        Current Outpatient Medications   Medication Instructions    albuterol (PROVENTIL/VENTOLIN HFA) 90 mcg/actuation inhaler 2 puffs, Inhalation, Every 4 hours PRN, Rescue     cefUROXime (CEFTIN) 500 mg    co-enzyme Q-10 30 mg, Oral, Daily    ezetimibe (ZETIA) 10 mg, Oral, Daily    FLUoxetine 20 mg, Oral, Daily    fluticasone (FLONASE) 50 mcg/actuation nasal spray 1 spray, Each Nostril, Daily PRN    ipratropium (ATROVENT) 0.03 % nasal spray 2 sprays, Nasal, Daily PRN    isosorbide  mononitrate (IMDUR) 30 MG 24 hr tablet TAKE 1 TABLET BY MOUTH EVERY DAY    KRILL OIL ORAL Oral    Lactobacillus rhamnosus GG (CULTURELLE) 10 billion cell capsule 1 capsule, Oral, Daily    levomefolate calcium (DEPLIN) 15 mg, Oral, Daily    loratadine (CLARITIN) 10 mg, Oral, Daily    losartan-hydrochlorothiazide 100-25 mg (HYZAAR) 100-25 mg per tablet 1 tablet, Oral, Daily    magnesium 30 mg, Oral, Daily    metFORMIN (GLUCOPHAGE) 500 mg, Oral, 2 times daily with meals    metroNIDAZOLE (FLAGYL) 500 mg    mv-mn/folic acid/vit K/bdos094 (ALIVE ONCE DAILY WOMEN 50 PLUS ORAL) 1 tablet, Oral, Daily    om 3/E/linol/ala/oleic/gla/lip (OMEGA 3-6-9 ORAL) Oral    potassium chloride (MICRO-K) 10 MEQ CpSR TAKE 1 CAPSULE BY MOUTH TWICE A DAY    terazosin (HYTRIN) 5 mg, Oral, Nightly    terazosin (HYTRIN) 2 mg, Oral, Nightly    terazosin (HYTRIN) 5 mg, Oral, Nightly    TIROSINT 88 mcg, Oral, Daily          Assessment & Plan     Essential hypertension    Contiue on hytrin 7 mg nightly, hyzaar 10-25, imdur 30   Low Na diet     Hypercholesteremia  Zetia 10 mg daily has been started by her PCP.    Dyspnea on exertion  Improved with Albuterol PRN    Statin intolerance  Zetia 10 mg daily has been started by her PCP.    Hyperglycemia  Now on metformin per PCP          No follow-ups on file.

## 2023-04-20 ENCOUNTER — TELEPHONE (OUTPATIENT)
Dept: CARDIOLOGY | Facility: CLINIC | Age: 78
End: 2023-04-20
Payer: MEDICARE

## 2023-04-20 NOTE — TELEPHONE ENCOUNTER
----- Message from Ester Bran sent at 4/20/2023  9:51 AM CDT -----  Type: Need Medical Advice   Who Called: Patient  Best callback number: [phone]  Additional Information: Patient called to reschedule her missed echo appointment,   Please call to further assist, Thanks

## 2023-05-08 ENCOUNTER — HOSPITAL ENCOUNTER (EMERGENCY)
Facility: HOSPITAL | Age: 78
Discharge: HOME | End: 2023-05-08
Attending: EMERGENCY MEDICINE
Payer: MEDICARE

## 2023-05-08 VITALS
WEIGHT: 178 LBS | TEMPERATURE: 99 F | OXYGEN SATURATION: 94 % | DIASTOLIC BLOOD PRESSURE: 80 MMHG | SYSTOLIC BLOOD PRESSURE: 159 MMHG | HEIGHT: 61 IN | RESPIRATION RATE: 18 BRPM | HEART RATE: 90 BPM | BODY MASS INDEX: 33.61 KG/M2

## 2023-05-08 DIAGNOSIS — R10.9 ABDOMINAL PAIN, UNSPECIFIED ABDOMINAL LOCATION: ICD-10-CM

## 2023-05-08 DIAGNOSIS — K57.92 DIVERTICULITIS: Primary | ICD-10-CM

## 2023-05-08 LAB
ALBUMIN SERPL BCP-MCNC: 4.3 G/DL (ref 3.5–5.2)
ALP SERPL-CCNC: 68 U/L (ref 55–135)
ALT SERPL W/O P-5'-P-CCNC: 22 U/L (ref 10–44)
ANION GAP SERPL CALC-SCNC: 10 MMOL/L (ref 8–16)
AST SERPL-CCNC: 28 U/L (ref 10–40)
BACTERIA #/AREA URNS HPF: NORMAL /HPF
BASOPHILS # BLD AUTO: 0.06 K/UL (ref 0–0.2)
BASOPHILS NFR BLD: 0.7 % (ref 0–1.9)
BILIRUB SERPL-MCNC: 0.9 MG/DL (ref 0.1–1)
BILIRUB UR QL STRIP: NEGATIVE
BILIRUB UR QL STRIP: NEGATIVE
BUN SERPL-MCNC: 11 MG/DL (ref 8–23)
CALCIUM SERPL-MCNC: 10.2 MG/DL (ref 8.7–10.5)
CHLORIDE SERPL-SCNC: 100 MMOL/L (ref 95–110)
CLARITY UR: CLEAR
CLARITY UR: CLEAR
CO2 SERPL-SCNC: 28 MMOL/L (ref 23–29)
COLOR UR: YELLOW
COLOR UR: YELLOW
CREAT SERPL-MCNC: 1 MG/DL (ref 0.5–1.4)
DIFFERENTIAL METHOD: ABNORMAL
EOSINOPHIL # BLD AUTO: 0.2 K/UL (ref 0–0.5)
EOSINOPHIL NFR BLD: 2.2 % (ref 0–8)
ERYTHROCYTE [DISTWIDTH] IN BLOOD BY AUTOMATED COUNT: 13.3 % (ref 11.5–14.5)
EST. GFR  (NO RACE VARIABLE): 58.4 ML/MIN/1.73 M^2
GLUCOSE SERPL-MCNC: 109 MG/DL (ref 70–110)
GLUCOSE UR QL STRIP: NEGATIVE
GLUCOSE UR QL STRIP: NEGATIVE
HCT VFR BLD AUTO: 39.6 % (ref 37–48.5)
HGB BLD-MCNC: 13 G/DL (ref 12–16)
HGB UR QL STRIP: NEGATIVE
HGB UR QL STRIP: NEGATIVE
HYALINE CASTS #/AREA URNS LPF: 0 /LPF
IMM GRANULOCYTES # BLD AUTO: 0.03 K/UL (ref 0–0.04)
IMM GRANULOCYTES NFR BLD AUTO: 0.3 % (ref 0–0.5)
KETONES UR QL STRIP: NEGATIVE
KETONES UR QL STRIP: NEGATIVE
LEUKOCYTE ESTERASE UR QL STRIP: ABNORMAL
LEUKOCYTE ESTERASE UR QL STRIP: NEGATIVE
LIPASE SERPL-CCNC: 31 U/L (ref 4–60)
LYMPHOCYTES # BLD AUTO: 2.8 K/UL (ref 1–4.8)
LYMPHOCYTES NFR BLD: 30.8 % (ref 18–48)
MCH RBC QN AUTO: 31.9 PG (ref 27–31)
MCHC RBC AUTO-ENTMCNC: 32.8 G/DL (ref 32–36)
MCV RBC AUTO: 97 FL (ref 82–98)
MICROSCOPIC COMMENT: NORMAL
MONOCYTES # BLD AUTO: 0.7 K/UL (ref 0.3–1)
MONOCYTES NFR BLD: 7.5 % (ref 4–15)
NEUTROPHILS # BLD AUTO: 5.2 K/UL (ref 1.8–7.7)
NEUTROPHILS NFR BLD: 58.5 % (ref 38–73)
NITRITE UR QL STRIP: NEGATIVE
NITRITE UR QL STRIP: NEGATIVE
NRBC BLD-RTO: 0 /100 WBC
PH UR STRIP: 5 [PH] (ref 5–8)
PH UR STRIP: 6 [PH] (ref 5–8)
PLATELET # BLD AUTO: 292 K/UL (ref 150–450)
PMV BLD AUTO: 9.7 FL (ref 9.2–12.9)
POTASSIUM SERPL-SCNC: 3.7 MMOL/L (ref 3.5–5.1)
PROT SERPL-MCNC: 7.8 G/DL (ref 6–8.4)
PROT UR QL STRIP: NEGATIVE
PROT UR QL STRIP: NEGATIVE
RBC # BLD AUTO: 4.07 M/UL (ref 4–5.4)
RBC #/AREA URNS HPF: 0 /HPF (ref 0–4)
SODIUM SERPL-SCNC: 138 MMOL/L (ref 136–145)
SP GR UR STRIP: 1 (ref 1–1.03)
SP GR UR STRIP: 1.01 (ref 1–1.03)
URN SPEC COLLECT METH UR: ABNORMAL
URN SPEC COLLECT METH UR: NORMAL
UROBILINOGEN UR STRIP-ACNC: NEGATIVE EU/DL
UROBILINOGEN UR STRIP-ACNC: NEGATIVE EU/DL
WBC # BLD AUTO: 8.96 K/UL (ref 3.9–12.7)
WBC #/AREA URNS HPF: 3 /HPF (ref 0–5)

## 2023-05-08 PROCEDURE — 96374 THER/PROPH/DIAG INJ IV PUSH: CPT

## 2023-05-08 PROCEDURE — 25000003 PHARM REV CODE 250: Performed by: STUDENT IN AN ORGANIZED HEALTH CARE EDUCATION/TRAINING PROGRAM

## 2023-05-08 PROCEDURE — 80053 COMPREHEN METABOLIC PANEL: CPT | Performed by: EMERGENCY MEDICINE

## 2023-05-08 PROCEDURE — 83690 ASSAY OF LIPASE: CPT | Performed by: EMERGENCY MEDICINE

## 2023-05-08 PROCEDURE — 81003 URINALYSIS AUTO W/O SCOPE: CPT | Mod: 59 | Performed by: EMERGENCY MEDICINE

## 2023-05-08 PROCEDURE — 99285 EMERGENCY DEPT VISIT HI MDM: CPT | Mod: 25

## 2023-05-08 PROCEDURE — 85025 COMPLETE CBC W/AUTO DIFF WBC: CPT | Performed by: EMERGENCY MEDICINE

## 2023-05-08 PROCEDURE — 25500020 PHARM REV CODE 255: Performed by: EMERGENCY MEDICINE

## 2023-05-08 PROCEDURE — 81001 URINALYSIS AUTO W/SCOPE: CPT | Performed by: EMERGENCY MEDICINE

## 2023-05-08 PROCEDURE — 96375 TX/PRO/DX INJ NEW DRUG ADDON: CPT

## 2023-05-08 PROCEDURE — 63600175 PHARM REV CODE 636 W HCPCS: Performed by: STUDENT IN AN ORGANIZED HEALTH CARE EDUCATION/TRAINING PROGRAM

## 2023-05-08 RX ORDER — ONDANSETRON 2 MG/ML
4 INJECTION INTRAMUSCULAR; INTRAVENOUS
Status: COMPLETED | OUTPATIENT
Start: 2023-05-08 | End: 2023-05-08

## 2023-05-08 RX ORDER — FAMOTIDINE 10 MG/ML
20 INJECTION INTRAVENOUS
Status: COMPLETED | OUTPATIENT
Start: 2023-05-08 | End: 2023-05-08

## 2023-05-08 RX ORDER — MAG HYDROX/ALUMINUM HYD/SIMETH 200-200-20
30 SUSPENSION, ORAL (FINAL DOSE FORM) ORAL ONCE
Status: COMPLETED | OUTPATIENT
Start: 2023-05-08 | End: 2023-05-08

## 2023-05-08 RX ORDER — AMOXICILLIN AND CLAVULANATE POTASSIUM 875; 125 MG/1; MG/1
1 TABLET, FILM COATED ORAL 2 TIMES DAILY
Qty: 14 TABLET | Refills: 0 | Status: SHIPPED | OUTPATIENT
Start: 2023-05-08 | End: 2023-10-17 | Stop reason: SDUPTHER

## 2023-05-08 RX ORDER — LIDOCAINE HYDROCHLORIDE 20 MG/ML
15 SOLUTION OROPHARYNGEAL ONCE
Status: COMPLETED | OUTPATIENT
Start: 2023-05-08 | End: 2023-05-08

## 2023-05-08 RX ADMIN — FAMOTIDINE 20 MG: 10 INJECTION, SOLUTION INTRAVENOUS at 06:05

## 2023-05-08 RX ADMIN — IOHEXOL 100 ML: 350 INJECTION, SOLUTION INTRAVENOUS at 07:05

## 2023-05-08 RX ADMIN — ALUMINUM HYDROXIDE, MAGNESIUM HYDROXIDE, AND SIMETHICONE 30 ML: 200; 200; 20 SUSPENSION ORAL at 06:05

## 2023-05-08 RX ADMIN — ONDANSETRON 4 MG: 2 INJECTION INTRAMUSCULAR; INTRAVENOUS at 06:05

## 2023-05-08 RX ADMIN — LIDOCAINE HYDROCHLORIDE 15 ML: 20 SOLUTION ORAL; TOPICAL at 06:05

## 2023-05-08 NOTE — FIRST PROVIDER EVALUATION
"Medical screening examination initiated.  I have conducted a focused provider triage encounter, findings are as follows:    Brief history of present illness:  LLQ pain     Vitals:    05/08/23 1400   BP: (!) 159/80   Pulse: 90   Resp: 18   Temp: 98.7 °F (37.1 °C)   TempSrc: Oral   SpO2: (!) 94%   Weight: 80.7 kg (178 lb)   Height: 5' 1" (1.549 m)       Pertinent physical exam:  Patient complains of left lower quadrant pain that started a few days prior denies any associated nausea or vomiting    Brief workup plan:  labs UA     Preliminary workup initiated; this workup will be continued and followed by the physician or advanced practice provider that is assigned to the patient when roomed.  "

## 2023-05-08 NOTE — ED PROVIDER NOTES
Encounter Date: 5/8/2023       History     Chief Complaint   Patient presents with    Abdominal Pain     Left sided abd pain since last night, denies n/v/d, c/o belching, taking cefdinir for sinus infection     HPI    77-year-old female with history of diverticulosis, hypothyroidism, prior kidney stones, hypertension, prediabetes presenting with 5 days of intermittent abdominal pain, bloating, and increased belching.  Patient states symptoms started on Thursday when she experienced left lower quadrant pain and left flank pain.  Patient also reports recent fever this past week.  Patient denies any dysuria, recent UTI, vomiting.  Patient has history of diverticulitis.  She is currently on cefdinir for a sinus infection.  Patient seen in ED 5 days ago where she did not receive any imaging and was discharged.  Patient states that pain has returned and was more severe.  Patient also reports that her stool seemed smaller in caliber today.  Able to pass gas.  No blood in rectum.  Had colonoscopy in the past 10 yrs.    Review of patient's allergies indicates:   Allergen Reactions    Cortisone Shortness Of Breath     Causes heart to race    Hair formula [mv,iron,min-folic acid-biotin]      dye    Levaquin [levofloxacin] Other (See Comments)     Tongue swelling and blisters    Codeine Nausea Only    Doxycycline      Other reaction(s): Unknown     Past Medical History:   Diagnosis Date    AR (allergic rhinitis) 11/29/2012    Benign hypertension     Depression     Diverticulosis 2005    Esophageal polyp     Fibromyalgia     Gastric ulcer; benign 2007    GERD (gastroesophageal reflux disease) 11/29/2012    Hypercholesteremia     Hypothyroidism     Kidney stones     MTHFR gene mutation     per daughter E72.8    Obstructive sleep apnea on CPAP     Osteopenia     Rectal polyp     Skin cancer     pre skin cancer skin    Vulvovaginal melanosis      Past Surgical History:   Procedure Laterality Date    CHOLECYSTECTOMY  08/08/2012     CHOLECYSTECTOMY      COLONOSCOPY N/A 2/6/2017    Procedure: COLONOSCOPY;  Surgeon: Arturo Crane MD;  Location: [facility];  Service: Endoscopy;  Laterality: N/A;    FOOT FRACTURE SURGERY  2005    plate in right foot    GANGLION CYST EXCISION  1980    left wrist    HYSTERECTOMY      KNEE ARTHROSCOPY W/ ACL RECONSTRUCTION      bilateral     NASAL SEPTUM SURGERY      TEAR DUCT SURGERY  2006    left eye    TOTAL KNEE ARTHROPLASTY      bilateral    TOTAL VAGINAL HYSTERECTOMY  age 31    w/BSO    TUBAL LIGATION  age 26     Family History   Problem Relation Age of Onset    Heart disease Father     Kidney disease Mother      Social History     Tobacco Use    Smoking status: Never    Smokeless tobacco: Never   Substance Use Topics    Alcohol use: No    Drug use: No     Review of Systems   Constitutional:  Negative for activity change, appetite change and fever.   HENT:  Negative for congestion and rhinorrhea.    Respiratory:  Negative for cough and shortness of breath.    Cardiovascular:  Negative for chest pain.   Gastrointestinal:  Positive for abdominal pain. Negative for constipation, diarrhea, nausea and vomiting.   Genitourinary:  Negative for decreased urine volume, difficulty urinating and dysuria.   Musculoskeletal:  Negative for back pain, neck pain and neck stiffness.   Skin:  Negative for rash and wound.   Neurological:  Negative for light-headedness and headaches.   Psychiatric/Behavioral:  Negative for confusion.      Physical Exam     Initial Vitals [05/08/23 1400]   BP Pulse Resp Temp SpO2   (!) 159/80 90 18 98.7 °F (37.1 °C) (!) 94 %      MAP       --         Physical Exam    Constitutional: She appears well-developed and well-nourished.   HENT:   Head: Normocephalic.   Eyes: Conjunctivae are normal.   Neck:   Normal range of motion.  Cardiovascular:  Normal rate.           No murmur heard.  Pulmonary/Chest: Breath sounds normal. No respiratory distress. She has no wheezes.   Abdominal: Abdomen is soft.  She exhibits distension. She exhibits no mass. There is abdominal tenderness.   Mild left lower quadrant tenderness and left flank tenderness There is no rebound and no guarding.   Musculoskeletal:         General: No edema. Normal range of motion.      Cervical back: Normal range of motion.     Neurological: She is alert and oriented to person, place, and time.   Skin: Skin is warm and dry. Capillary refill takes less than 2 seconds. No rash noted.       ED Course   Procedures  Labs Reviewed   CBC W/ AUTO DIFFERENTIAL - Abnormal; Notable for the following components:       Result Value    MCH 31.9 (*)     All other components within normal limits    Narrative:     For upper or mid abdominal pain.   COMPREHENSIVE METABOLIC PANEL - Abnormal; Notable for the following components:    eGFR 58.4 (*)     All other components within normal limits    Narrative:     For upper or mid abdominal pain.   URINALYSIS, REFLEX TO URINE CULTURE - Abnormal; Notable for the following components:    Leukocytes, UA Trace (*)     All other components within normal limits    Narrative:     In and Out Cath as needed it patient unable to void  Specimen Source->Urine   LIPASE    Narrative:     For upper or mid abdominal pain.   URINALYSIS MICROSCOPIC    Narrative:     In and Out Cath as needed it patient unable to void  Specimen Source->Urine   URINALYSIS, REFLEX TO URINE CULTURE          Imaging Results              CT Abdomen Pelvis With Contrast (Final result)  Result time 05/08/23 20:12:03      Final result by Jero See MD (05/08/23 20:12:03)                   Narrative:    EXAM:  CT Abdomen and Pelvis With Intravenous Contrast    CLINICAL HISTORY:  The patient is 77 years old and is Female; LLQ abdominal pain; Bowel obstruction suspected    TECHNIQUE:  Axial computed tomography images of the abdomen and pelvis with intravenous contrast.  Sagittal and coronal reformatted images were created and reviewed.  This CT exam was  performed using one or more of the following dose reduction techniques:  automated exposure control, adjustment of the mA and/or kV according to patient size, and/or use of iterative reconstruction technique.    COMPARISON:  No relevant prior studies available.    FINDINGS:  LUNG BASES:  Unremarkable.  No mass.  No consolidation.    ABDOMEN:  LIVER:  Unremarkable.  No mass.  GALLBLADDER AND BILE DUCTS:  Cholecystectomy.  No ductal dilation.  PANCREAS:  Unremarkable.  No mass.  No ductal dilation.  SPLEEN:  Unremarkable.  No splenomegaly.  ADRENALS:  Unremarkable.  No mass.  KIDNEYS AND URETERS:  Unremarkable.  No solid mass.  No hydronephrosis.  STOMACH AND BOWEL:  Mid sigmoid colon inflamed diverticulum with mild pericolonic stranding. No fluid collection or free air.  Sigmoid colon diverticulosis.  No obstruction.  No mucosal thickening.    PELVIS:  APPENDIX:  No findings to suggest acute appendicitis.  BLADDER:  Unremarkable.  No mass.  REPRODUCTIVE:  Hysterectomy.    ABDOMEN and PELVIS:  INTRAPERITONEAL SPACE:  See above.  BONES/JOINTS:  No acute osseous findings. Mild levoscoliosis in the lumbar spine.  No dislocation.  SOFT TISSUES:  Unremarkable.  VASCULATURE:  No abdominal aortic aneurysm. Mild atherosclerotic calcifications.  LYMPH NODES:  Unremarkable.  No enlarged lymph nodes.    IMPRESSION:  Mid sigmoid colon acute diverticulitis, uncomplicated.    Electronically signed by:  Jero See MD  5/8/2023 8:12 PM CDT Workstation: [id number]                                     Medications   aluminum-magnesium hydroxide-simethicone 200-200-20 mg/5 mL suspension 30 mL (30 mLs Oral Given 5/8/23 1827)     And   LIDOcaine HCl 2% oral solution 15 mL (15 mLs Oral Given 5/8/23 1827)   famotidine (PF) injection 20 mg (20 mg Intravenous Given 5/8/23 1828)   ondansetron injection 4 mg (4 mg Intravenous Given 5/8/23 1828)   iohexoL (OMNIPAQUE 350) injection 100 mL (100 mLs Intravenous Given 5/8/23 1949)     Medical  Decision Making:   Initial Assessment:   77-year-old female with history of diverticulitis, hypothyroidism and kidney stones presenting with 2 days of intermittent left lower quadrant abdominal pain.  Patient afebrile.  No bright red blood per rectum.  Vital signs are stable.  Exam with mild left lower quadrant abdominal pain and some distention with frequent belching.  Differential Diagnosis:   Partial bowel obstruction, diverticulitis, pyelonephritis, renal stone  Clinical Tests:   Lab Tests: Ordered and Reviewed  Radiological Study: Ordered and Reviewed  ED Management:  Patient given GI cocktail, Zofran, and Pepcid.  UA without evidence of UTI or renal stone, pancreatitis.  No leukocytosis.  No evidence of liver injury, acute kidney injury, or other acute abnormality.  Lipase within normal limits.  Given age and return visit to the ED, CT abdomen pelvis ordered.  Results show mild diverticulitis.  Will treat patient with course of Augmentin with recommendations to follow-up with PCP for close follow-up.  Return precautions were discussed.  Patient understands and agrees with this plan.    Bravo Patel MD  Internal/Emergency Medicine, PGY-V                            Clinical Impression:   Final diagnoses:  [K57.92] Diverticulitis (Primary)  [R10.9] Abdominal pain, unspecified abdominal location        ED Disposition Condition    Discharge Stable          ED Prescriptions       Medication Sig Dispense Start Date End Date Auth. Provider    amoxicillin-clavulanate 875-125mg (AUGMENTIN) 875-125 mg per tablet Take 1 tablet by mouth 2 (two) times daily. 14 tablet 5/8/2023 -- Bravo Patel MD          Follow-up Information       Follow up With Specialties Details Why Contact Info Additional Information    [facility] - Emergency Dept Emergency Medicine  As needed, If symptoms worsen [address] [facility] [address] [phone] 1st floor             Bravo Patel MD  Resident  05/08/23  2057

## 2023-06-02 ENCOUNTER — HOSPITAL ENCOUNTER (OUTPATIENT)
Dept: CARDIOLOGY | Facility: HOSPITAL | Age: 78
Discharge: HOME | End: 2023-06-02
Attending: NURSE PRACTITIONER
Payer: MEDICARE

## 2023-06-02 VITALS — HEIGHT: 61 IN | BODY MASS INDEX: 33.61 KG/M2 | WEIGHT: 178 LBS

## 2023-06-02 DIAGNOSIS — R06.02 SOB (SHORTNESS OF BREATH): ICD-10-CM

## 2023-06-02 PROCEDURE — 93306 TTE W/DOPPLER COMPLETE: CPT

## 2023-06-02 PROCEDURE — 93306 TTE W/DOPPLER COMPLETE: CPT | Mod: 26,,, | Performed by: INTERNAL MEDICINE

## 2023-06-02 PROCEDURE — 93306 ECHO (CUPID ONLY): ICD-10-PCS | Mod: 26,,, | Performed by: INTERNAL MEDICINE

## 2023-06-07 LAB
AORTIC ROOT ANNULUS: 3.1 CM
AORTIC VALVE CUSP SEPERATION: 1.3 CM
AV INDEX (PROSTH): 0.61
AV MEAN GRADIENT: 10 MMHG
AV PEAK GRADIENT: 17 MMHG
AV VALVE AREA: 1.92 CM2
AV VELOCITY RATIO: 0.61
BSA FOR ECHO PROCEDURE: 1.86 M2
CV ECHO LV RWT: 0.3 CM
DOP CALC AO PEAK VEL: 2.08 M/S
DOP CALC AO VTI: 46.4 CM
DOP CALC LVOT AREA: 3.1 CM2
DOP CALC LVOT DIAMETER: 2 CM
DOP CALC LVOT PEAK VEL: 1.26 M/S
DOP CALC LVOT STROKE VOLUME: 88.86 CM3
DOP CALCLVOT PEAK VEL VTI: 28.3 CM
E WAVE DECELERATION TIME: 218 MSEC
E/A RATIO: 1.12
E/E' RATIO: 11.11 M/S
ECHO LV POSTERIOR WALL: 0.67 CM (ref 0.6–1.1)
EJECTION FRACTION: 60 %
FRACTIONAL SHORTENING: 30 % (ref 28–44)
INTERVENTRICULAR SEPTUM: 1.07 CM (ref 0.6–1.1)
IVRT: 95 MSEC
LEFT INTERNAL DIMENSION IN SYSTOLE: 3.07 CM (ref 2.1–4)
LEFT VENTRICLE DIASTOLIC VOLUME INDEX: 48.72 ML/M2
LEFT VENTRICLE DIASTOLIC VOLUME: 87.7 ML
LEFT VENTRICLE MASS INDEX: 68 G/M2
LEFT VENTRICLE SYSTOLIC VOLUME INDEX: 20.6 ML/M2
LEFT VENTRICLE SYSTOLIC VOLUME: 37 ML
LEFT VENTRICULAR INTERNAL DIMENSION IN DIASTOLE: 4.4 CM (ref 3.5–6)
LEFT VENTRICULAR MASS: 122.31 G
LV LATERAL E/E' RATIO: 9.09 M/S
LV SEPTAL E/E' RATIO: 14.29 M/S
LVOT MG: 4 MMHG
LVOT MV: 0.88 CM/S
MV PEAK A VEL: 0.89 M/S
MV PEAK E VEL: 1 M/S
MV STENOSIS PRESSURE HALF TIME: 73 MS
MV VALVE AREA P 1/2 METHOD: 3.01 CM2
PISA TR MAX VEL: 2.82 M/S
RA PRESSURE: 3 MMHG
RIGHT VENTRICULAR END-DIASTOLIC DIMENSION: 2.18 CM
TDI LATERAL: 0.11 M/S
TDI SEPTAL: 0.07 M/S
TDI: 0.09 M/S
TR MAX PG: 32 MMHG
TV REST PULMONARY ARTERY PRESSURE: 35 MMHG

## 2023-09-01 ENCOUNTER — TELEPHONE (OUTPATIENT)
Dept: CARDIOLOGY | Facility: CLINIC | Age: 78
End: 2023-09-01
Payer: MEDICARE

## 2023-09-01 NOTE — TELEPHONE ENCOUNTER
----- Message from Henrry Ames sent at 9/1/2023  1:44 PM CDT -----  Contact: pt at [phone]  Type:  Sooner Appointment Request    Caller is requesting a sooner appointment.  Caller declined first available appointment listed below.  Caller will not accept being placed on the waitlist and is requesting a message be sent to doctor.    Name of Caller:  pt  When is the first available appointment?  9/20  Symptoms:  f/u and left leg is severely swollen  Best Call Back Number:  [phone]  Additional Information:  pt is calling the office to reschedule her 9/20 appt due to her left leg is severely swollen and the date of 10/4 comes up she states she needs to be seen sooner than that date.

## 2023-09-08 NOTE — TELEPHONE ENCOUNTER
Lm for pt that daren doesn't have anything sooner, but moved her appt up to 9/13 at 320pm with pernell phelps

## 2023-09-20 ENCOUNTER — OFFICE VISIT (OUTPATIENT)
Dept: CARDIOLOGY | Facility: CLINIC | Age: 78
End: 2023-09-20
Payer: MEDICARE

## 2023-09-20 VITALS
HEART RATE: 60 BPM | BODY MASS INDEX: 33.79 KG/M2 | SYSTOLIC BLOOD PRESSURE: 122 MMHG | OXYGEN SATURATION: 97 % | DIASTOLIC BLOOD PRESSURE: 80 MMHG | HEIGHT: 61 IN | WEIGHT: 179 LBS

## 2023-09-20 DIAGNOSIS — I35.0 NONRHEUMATIC AORTIC VALVE STENOSIS: ICD-10-CM

## 2023-09-20 DIAGNOSIS — R73.9 HYPERGLYCEMIA: ICD-10-CM

## 2023-09-20 DIAGNOSIS — E07.9 THYROID DYSFUNCTION: ICD-10-CM

## 2023-09-20 DIAGNOSIS — R07.9 CHEST PAIN, UNSPECIFIED TYPE: Primary | ICD-10-CM

## 2023-09-20 DIAGNOSIS — R09.89 BILATERAL CAROTID BRUITS: ICD-10-CM

## 2023-09-20 PROCEDURE — 99999 PR PBB SHADOW E&M-EST. PATIENT-LVL IV: ICD-10-PCS | Mod: PBBFAC,,, | Performed by: NURSE PRACTITIONER

## 2023-09-20 PROCEDURE — 99214 OFFICE O/P EST MOD 30 MIN: CPT | Mod: PBBFAC,PN | Performed by: NURSE PRACTITIONER

## 2023-09-20 PROCEDURE — 99214 PR OFFICE/OUTPT VISIT, EST, LEVL IV, 30-39 MIN: ICD-10-PCS | Mod: S$PBB,,, | Performed by: NURSE PRACTITIONER

## 2023-09-20 PROCEDURE — 99214 OFFICE O/P EST MOD 30 MIN: CPT | Mod: S$PBB,,, | Performed by: NURSE PRACTITIONER

## 2023-09-20 PROCEDURE — 99999 PR PBB SHADOW E&M-EST. PATIENT-LVL IV: CPT | Mod: PBBFAC,,, | Performed by: NURSE PRACTITIONER

## 2023-09-20 RX ORDER — ISOSORBIDE MONONITRATE 30 MG/1
60 TABLET, EXTENDED RELEASE ORAL DAILY
Qty: 90 TABLET | Refills: 3 | Status: SHIPPED | OUTPATIENT
Start: 2023-09-20

## 2023-09-20 RX ORDER — ISOSORBIDE MONONITRATE 30 MG/1
30 TABLET, EXTENDED RELEASE ORAL DAILY
Qty: 90 TABLET | Refills: 3 | Status: SHIPPED | OUTPATIENT
Start: 2023-09-20 | End: 2023-09-20 | Stop reason: SDUPTHER

## 2023-09-20 NOTE — PROGRESS NOTES
[facility]    Subjective:     Patient ID:  Iram Aguirre is a 78 y.o. female patient here for evaluation Hospital Follow Up (IN THE [facility] [place]), Edema, Shortness of Breath, and Fatigue      HPI    Iram Aguirre is here for follow-up visit. RECENTLY WENT TO ER WITH CP SOB AND PERIPHERAL EDEMA Denies recent fever cough chills or congestion. Denies blood in the urine or blood in the stool.   Denies orthopnea. + Peripheral edema. Denies nausea vomiting or dyspepsia. No recent arm neck or jaw pain. No lightheadedness or dizziness.       Review of Systems   Constitutional:  Positive for malaise/fatigue.   Respiratory:  Positive for shortness of breath.    Cardiovascular:  Positive for chest pain, orthopnea and leg swelling.   Musculoskeletal:  Positive for joint pain and myalgias.   All other systems reviewed and are negative.       Past Medical History:   Diagnosis Date    AR (allergic rhinitis) 11/29/2012    Benign hypertension     Depression     Diverticulosis 2005    Esophageal polyp     Fibromyalgia     Gastric ulcer; benign 2007    GERD (gastroesophageal reflux disease) 11/29/2012    Hypercholesteremia     Hypothyroidism     Kidney stones     MTHFR gene mutation     per daughter E72.8    Obstructive sleep apnea on CPAP     Osteopenia     Rectal polyp     Skin cancer     pre skin cancer skin    Vulvovaginal melanosis        Past Surgical History:   Procedure Laterality Date    CHOLECYSTECTOMY  08/08/2012    CHOLECYSTECTOMY      COLONOSCOPY N/A 2/6/2017    Procedure: COLONOSCOPY;  Surgeon: Arturo Crane MD;  Location: [facility];  Service: Endoscopy;  Laterality: N/A;    FOOT FRACTURE SURGERY  2005    plate in right foot    GANGLION CYST EXCISION  1980    left wrist    HYSTERECTOMY      KNEE ARTHROSCOPY W/ ACL RECONSTRUCTION      bilateral     NASAL SEPTUM SURGERY      TEAR DUCT SURGERY  2006    left eye    TOTAL KNEE ARTHROPLASTY      bilateral     TOTAL VAGINAL HYSTERECTOMY  age 31    w/BSO    TUBAL LIGATION  age 26       Family History   Problem Relation Age of Onset    Heart disease Father     Kidney disease Mother        Social History     Socioeconomic History    Marital status:     Number of children: 3   Tobacco Use    Smoking status: Never    Smokeless tobacco: Never   Substance and Sexual Activity    Alcohol use: No    Drug use: No    Sexual activity: Not Currently     Partners: Male     Birth control/protection: None       Current Outpatient Medications   Medication Sig Dispense Refill    albuterol (PROVENTIL/VENTOLIN HFA) 90 mcg/actuation inhaler Inhale 2 puffs into the lungs every 4 (four) hours as needed for Wheezing. Rescue      amoxicillin-clavulanate 875-125mg (AUGMENTIN) 875-125 mg per tablet Take 1 tablet by mouth 2 (two) times daily. 14 tablet 0    co-enzyme Q-10 30 mg capsule Take 30 mg by mouth once daily.      FLUoxetine 20 MG capsule Take 20 mg by mouth once daily.   2    fluticasone (FLONASE) 50 mcg/actuation nasal spray 1 spray by Each Nostril route daily as needed.       ipratropium (ATROVENT) 0.03 % nasal spray 2 sprays by Nasal route daily as needed.       KRILL OIL ORAL Take by mouth.      Lactobacillus rhamnosus GG (CULTURELLE) 10 billion cell capsule Take 1 capsule by mouth once daily.      levomefolate calcium (DEPLIN) 7.5 mg Tab tablet Take 15 mg by mouth once daily.       loratadine (CLARITIN) 10 mg tablet Take 10 mg by mouth once daily.      losartan-hydrochlorothiazide 100-25 mg (HYZAAR) 100-25 mg per tablet TAKE 1 TABLET BY MOUTH EVERY DAY 90 tablet 3    magnesium 30 mg tablet Take 30 mg by mouth once daily.      metFORMIN (GLUCOPHAGE) 500 MG tablet Take 500 mg by mouth 2 (two) times daily with meals.      mv-mn/folic acid/vit [id number] (ALIVE ONCE DAILY WOMEN 50 PLUS ORAL) Take 1 tablet by mouth once daily.      om 3/E/linol/ala/oleic/gla/lip (OMEGA 3-6-9 ORAL) Take by mouth.      potassium chloride (MICRO-K) 10  MEQ CpSR TAKE 1 CAPSULE BY MOUTH TWICE A  capsule 3    terazosin (HYTRIN) 2 MG capsule TAKE 1 CAPSULE (2 MG TOTAL) BY MOUTH EVERY EVENING. 90 capsule 3    terazosin (HYTRIN) 5 MG capsule TAKE 1 CAPSULE (5 MG TOTAL) BY MOUTH EVERY EVENING. 90 capsule 3    terazosin (HYTRIN) 5 MG capsule Take 1 capsule (5 mg total) by mouth every evening. 90 capsule 3    TIROSINT 88 mcg Cap Take 88 mcg by mouth once daily.       isosorbide mononitrate (IMDUR) 30 MG 24 hr tablet Take 2 tablets (60 mg total) by mouth once daily. 90 tablet 3     No current facility-administered medications for this visit.       Review of patient's allergies indicates:   Allergen Reactions    Cortisone Shortness Of Breath     Causes heart to race    Hair formula [mv,iron,min-folic acid-biotin]      dye    Levaquin [levofloxacin] Other (See Comments)     Tongue swelling and blisters    Codeine Nausea Only    Doxycycline      Other reaction(s): Unknown         Objective:        Vitals:    09/20/23 1503   BP: 122/80   Pulse: 60       Physical Exam  Constitutional:       Appearance: Normal appearance. She is normal weight.   Eyes:      Pupils: Pupils are equal, round, and reactive to light.   Cardiovascular:      Rate and Rhythm: Normal rate.      Heart sounds: Murmur heard.   Pulmonary:      Effort: Pulmonary effort is normal.      Breath sounds: Normal breath sounds.   Musculoskeletal:      Left lower leg: Edema present.   Skin:     General: Skin is warm and dry.      Capillary Refill: Capillary refill takes less than 2 seconds.   Neurological:      General: No focal deficit present.      Mental Status: She is alert and oriented to person, place, and time.   Psychiatric:         Mood and Affect: Mood normal.         Behavior: Behavior normal.         Thought Content: Thought content normal.         Judgment: Judgment normal.         LIPIDS - LAST 2   Lab Results   Component Value Date    CHOL 262 (H) 09/19/2022    CHOL 224 (H) 11/08/2019    HDL 74  09/19/2022    HDL 56 11/08/2019    LDLCALC 174.4 (H) 09/19/2022    LDLCALC 145.0 11/08/2019    TRIG 68 09/19/2022    TRIG 115 11/08/2019    CHOLHDL 28.2 09/19/2022    CHOLHDL 25.0 11/08/2019       CBC - LAST 2  Lab Results   Component Value Date    WBC 8.96 05/08/2023    WBC 6.07 09/15/2022    RBC 4.07 05/08/2023    RBC 3.86 (L) 09/15/2022    HGB 13.0 05/08/2023    HGB 12.4 09/15/2022    HCT 39.6 05/08/2023    HCT 37.8 09/15/2022    MCV 97 05/08/2023    MCV 98 09/15/2022    MCH 31.9 (H) 05/08/2023    MCH 32.1 (H) 09/15/2022    MCHC 32.8 05/08/2023    MCHC 32.8 09/15/2022    RDW 13.3 05/08/2023    RDW 13.5 09/15/2022     05/08/2023     09/15/2022    MPV 9.7 05/08/2023    MPV 9.8 09/15/2022    GRAN 5.2 05/08/2023    GRAN 58.5 05/08/2023    LYMPH 2.8 05/08/2023    LYMPH 30.8 05/08/2023    MONO 0.7 05/08/2023    MONO 7.5 05/08/2023    BASO 0.06 05/08/2023    BASO 0.05 03/24/2021    NRBC 0 05/08/2023    NRBC 0 03/24/2021       CHEMISTRY & LIVER FUNCTION - LAST 2  Lab Results   Component Value Date     05/08/2023     09/15/2022    K 3.7 05/08/2023    K 3.6 09/15/2022     05/08/2023     09/15/2022    CO2 28 05/08/2023    CO2 30 (H) 09/15/2022    ANIONGAP 10 05/08/2023    ANIONGAP 7 (L) 09/15/2022    BUN 11 05/08/2023    BUN 17 09/15/2022    CREATININE 1.0 05/08/2023    CREATININE 0.9 09/15/2022     05/08/2023     09/15/2022    CALCIUM 10.2 05/08/2023    CALCIUM 9.9 09/15/2022    MG 1.8 11/08/2019    ALBUMIN 4.3 05/08/2023    ALBUMIN 3.9 09/15/2022    PROT 7.8 05/08/2023    PROT 7.4 09/15/2022    ALKPHOS 68 05/08/2023    ALKPHOS 67 09/15/2022    ALT 22 05/08/2023    ALT 21 09/15/2022    AST 28 05/08/2023    AST 26 09/15/2022    BILITOT 0.9 05/08/2023    BILITOT 0.8 09/15/2022        CARDIAC PROFILE - LAST 2  Lab Results   Component Value Date    BNP 10 09/15/2022    BNP 34 03/24/2021    CPK 68 03/24/2021     03/24/2021    TROPONINI <0.030 03/24/2021    TROPONINI  <0.030 11/08/2019        COAGULATION - LAST 2  Lab Results   Component Value Date    LABPT 13.8 11/07/2019    INR 1.2 09/20/2021    INR 1.1 11/07/2019       ENDOCRINE & PSA - LAST 2  Lab Results   Component Value Date    HGBA1C 5.9 11/15/2021    HGBA1C 5.9 11/08/2019    TSH 1.620 09/15/2022    TSH 2.517 12/06/2021    PROCAL <0.05 03/24/2021        ECHOCARDIOGRAM RESULTS  Results for orders placed during the hospital encounter of 06/02/23    Echo    Interpretation Summary  · The left ventricle is normal in size with normal systolic function.  · Normal left ventricular diastolic function.  · The estimated ejection fraction is 60%.  · Normal right ventricular size with normal right ventricular systolic function.  · Normal central venous pressure (3 mmHg).  · The estimated PA systolic pressure is 35 mmHg.  · There is mild aortic valve stenosis.  · Aortic valve area is 1.92 cm2; peak velocity is 2.08 m/s; mean gradient is 10 mmHg.      CURRENT/PREVIOUS VISIT EKG  Results for orders placed or performed during the hospital encounter of 09/20/21   EKG 12-lead    Collection Time: 09/20/21 11:18 AM    Narrative    Test Reason : R07.9,    Vent. Rate : 069 BPM     Atrial Rate : 069 BPM     P-R Int : 160 ms          QRS Dur : 126 ms      QT Int : 430 ms       P-R-T Axes : 067 065 042 degrees     QTc Int : 460 ms    Normal sinus rhythm  Right bundle branch block  Abnormal ECG  When compared with ECG of 24-MAR-2021 18:13,  No significant change was found  Confirmed by Sandip CRUZ, Koko CAVAZOS ([id number]) on 9/22/2021 10:50:37 PM    Referred By: [email or web address]   SELF           Confirmed By:Koko Oropeza MD     No valid procedures specified.   Results for orders placed during the hospital encounter of 09/22/22    Nuclear Stress - Cardiology Interpreted    Interpretation Summary    Normal myocardial perfusion scan. There is no evidence of myocardial ischemia or infarction.    There is a mild intensity perfusion abnormality in the  wall of the left  ventricle, secondary to breast attenuation.    The gated perfusion images showed an ejection fraction of 66% post stress. Normal ejection fraction is greater than 47%.    There is normal wall motion post stress.    LV cavity size is  and normal at stress.    The EKG portion of this study is negative for ischemia.    The patient reported no chest pain during the stress test.    There were no arrhythmias during stress.            Assessment:       1. Chest pain, unspecified type    2. Thyroid dysfunction    3. Hyperglycemia    4. Bilateral carotid bruits    5. Nonrheumatic aortic valve stenosis         Add ASA 81 mg daily  Increase Imdur to 60 mg daily  ECHO  STAN  US L LEG  Follow up after      Problem List Items Addressed This Visit          Cardiac/Vascular    Chest pain - Primary    Nonrheumatic aortic valve stenosis    Bilateral carotid bruits       Endocrine    Thyroid dysfunction    Hyperglycemia          [facility] [place]  [place]

## 2023-09-21 ENCOUNTER — PATIENT MESSAGE (OUTPATIENT)
Dept: CARDIOLOGY | Facility: CLINIC | Age: 78
End: 2023-09-21
Payer: MEDICARE

## 2023-09-22 ENCOUNTER — HOSPITAL ENCOUNTER (OUTPATIENT)
Dept: RADIOLOGY | Facility: HOSPITAL | Age: 78
Discharge: HOME | End: 2023-09-22
Attending: NURSE PRACTITIONER
Payer: MEDICARE

## 2023-09-22 DIAGNOSIS — R07.9 CHEST PAIN, UNSPECIFIED TYPE: ICD-10-CM

## 2023-09-22 PROCEDURE — 93971 US LOWER EXTREMITY VEINS LEFT: ICD-10-PCS | Mod: 26,LT,, | Performed by: RADIOLOGY

## 2023-09-22 PROCEDURE — 93971 EXTREMITY STUDY: CPT | Mod: 26,LT,, | Performed by: RADIOLOGY

## 2023-09-22 PROCEDURE — 93971 EXTREMITY STUDY: CPT | Mod: TC,LT

## 2023-09-26 DIAGNOSIS — E07.9 THYROID DYSFUNCTION: ICD-10-CM

## 2023-09-26 RX ORDER — TERAZOSIN 2 MG/1
2 CAPSULE ORAL NIGHTLY
Qty: 90 CAPSULE | Refills: 3 | Status: CANCELLED | OUTPATIENT
Start: 2023-09-26 | End: 2024-09-25

## 2023-09-26 RX ORDER — TERAZOSIN 5 MG/1
5 CAPSULE ORAL NIGHTLY
Qty: 90 CAPSULE | Refills: 3 | Status: CANCELLED | OUTPATIENT
Start: 2023-09-26

## 2023-09-27 ENCOUNTER — HOSPITAL ENCOUNTER (EMERGENCY)
Facility: HOSPITAL | Age: 78
Discharge: HOME | End: 2023-09-27
Attending: EMERGENCY MEDICINE
Payer: MEDICARE

## 2023-09-27 VITALS
DIASTOLIC BLOOD PRESSURE: 79 MMHG | TEMPERATURE: 98 F | OXYGEN SATURATION: 96 % | SYSTOLIC BLOOD PRESSURE: 150 MMHG | WEIGHT: 179 LBS | HEART RATE: 60 BPM | HEIGHT: 60 IN | RESPIRATION RATE: 18 BRPM | BODY MASS INDEX: 35.14 KG/M2

## 2023-09-27 DIAGNOSIS — M25.559 HIP PAIN: ICD-10-CM

## 2023-09-27 DIAGNOSIS — M54.30 SCIATIC LEG PAIN: Primary | ICD-10-CM

## 2023-09-27 LAB — POCT GLUCOSE: 97 MG/DL (ref 70–110)

## 2023-09-27 PROCEDURE — 82962 GLUCOSE BLOOD TEST: CPT

## 2023-09-27 PROCEDURE — 99283 EMERGENCY DEPT VISIT LOW MDM: CPT

## 2023-09-27 PROCEDURE — 25000003 PHARM REV CODE 250

## 2023-09-27 RX ORDER — LIDOCAINE 50 MG/G
1 PATCH TOPICAL DAILY
Qty: 5 PATCH | Refills: 0 | Status: SHIPPED | OUTPATIENT
Start: 2023-09-27

## 2023-09-27 RX ORDER — LIDOCAINE 50 MG/G
1 PATCH TOPICAL ONCE
Status: DISCONTINUED | OUTPATIENT
Start: 2023-09-27 | End: 2023-09-27

## 2023-09-27 RX ORDER — ACETAMINOPHEN 325 MG/1
650 TABLET ORAL EVERY 6 HOURS PRN
Qty: 12 TABLET | Refills: 0 | Status: SHIPPED | OUTPATIENT
Start: 2023-09-27 | End: 2023-09-30

## 2023-09-27 RX ORDER — LIDOCAINE 50 MG/G
1 PATCH TOPICAL ONCE
Status: DISCONTINUED | OUTPATIENT
Start: 2023-09-27 | End: 2023-09-27 | Stop reason: HOSPADM

## 2023-09-27 RX ORDER — OXYCODONE HYDROCHLORIDE 5 MG/1
5 TABLET ORAL EVERY 6 HOURS PRN
Qty: 8 TABLET | Refills: 0 | Status: SHIPPED | OUTPATIENT
Start: 2023-09-27

## 2023-09-27 RX ADMIN — LIDOCAINE 1 PATCH: 50 PATCH CUTANEOUS at 03:09

## 2023-09-27 NOTE — TELEPHONE ENCOUNTER
----- Message from Kendall Seals sent at 9/27/2023 10:30 AM CDT -----  Regarding: Return Call  Contact: patient  Type:  Patient Returning Call    Who Called:patient  Who Left Message for Patient:office nurse  Does the patient know what this is regarding?:patient is going to Urgent Care/Lower Spine to Left hip to knee  Would the patient rather a call back or a response via [facility]? Please advise  Best Call Back Number:[phone]  Additional Information:

## 2023-09-27 NOTE — ED TRIAGE NOTES
"Pt co of L hip and L knee pain. Pt states she was diagnosed with Baker's cyst, has follow up apt with ortho on Monday, but pain has gotten too bad "she can't ignore it".  "

## 2023-09-27 NOTE — ED PROVIDER NOTES
Encounter Date: 9/27/2023       History     Chief Complaint   Patient presents with    Knee Pain     L knee/hip pain. Recently dx with bakers cyst. Has ortho appt Monday but can't handle the pain     78-year-old female with PMH of hypertension, fibromyalgia, GERD, hypothyroidism,HLD, osteopenia presents with chronic left lower back pain that has worsened this morning.  She notes she woke up with persistent 6/10 sharp lower back pain. She was able to get out of her bed and walk with notable increased pain to the lower back radiating down left leg.  She notes this pain was constant for about an hour but now only hurts with palpation or certain movements.  Patient has a long history of arthralgias, knee pain, and spinal pain managed with over-the-counter anti-inflammatories.  She has a appointment with orthopedist on Monday.  She denies any bowel or bladder incontinence, fevers, IV drug use, midline tenderness, trauma.  She took Motrin prior to arrival with little relief of symptoms.      The history is provided by the patient. No  was used.     Review of patient's allergies indicates:   Allergen Reactions    Cortisone Shortness Of Breath     Causes heart to race    Hair formula [mv,iron,min-folic acid-biotin]      dye    Levaquin [levofloxacin] Other (See Comments)     Tongue swelling and blisters    Codeine Nausea Only    Doxycycline      Other reaction(s): Unknown     Past Medical History:   Diagnosis Date    AR (allergic rhinitis) 11/29/2012    Benign hypertension     Depression     Diverticulosis 2005    Esophageal polyp     Fibromyalgia     Gastric ulcer; benign 2007    GERD (gastroesophageal reflux disease) 11/29/2012    Hypercholesteremia     Hypothyroidism     Kidney stones     MTHFR gene mutation     per daughter E72.8    Obstructive sleep apnea on CPAP     Osteopenia     Rectal polyp     Skin cancer     pre skin cancer skin    Vulvovaginal melanosis      Past Surgical History:   Procedure  Laterality Date    CHOLECYSTECTOMY  08/08/2012    CHOLECYSTECTOMY      COLONOSCOPY N/A 2/6/2017    Procedure: COLONOSCOPY;  Surgeon: Arturo Crane MD;  Location: [facility];  Service: Endoscopy;  Laterality: N/A;    FOOT FRACTURE SURGERY  2005    plate in right foot    GANGLION CYST EXCISION  1980    left wrist    HYSTERECTOMY      KNEE ARTHROSCOPY W/ ACL RECONSTRUCTION      bilateral     NASAL SEPTUM SURGERY      TEAR DUCT SURGERY  2006    left eye    TOTAL KNEE ARTHROPLASTY      bilateral    TOTAL VAGINAL HYSTERECTOMY  age 31    w/BSO    TUBAL LIGATION  age 26     Family History   Problem Relation Age of Onset    Heart disease Father     Kidney disease Mother      Social History     Tobacco Use    Smoking status: Never    Smokeless tobacco: Never   Substance Use Topics    Alcohol use: No    Drug use: No     Review of Systems   Constitutional:  Negative for chills and fever.   HENT:  Negative for congestion, ear pain, nosebleeds, rhinorrhea, sore throat and trouble swallowing.    Eyes:  Negative for redness.   Respiratory:  Negative for cough, shortness of breath and stridor.    Cardiovascular:  Negative for chest pain.   Gastrointestinal:  Negative for abdominal distention, abdominal pain, constipation, diarrhea, nausea and vomiting.   Genitourinary:  Negative for decreased urine volume, dysuria, frequency, hematuria and urgency.   Musculoskeletal:  Positive for arthralgias, back pain and gait problem. Negative for joint swelling, myalgias, neck pain and neck stiffness.   Skin:  Negative for rash and wound.   Neurological:  Negative for dizziness, speech difficulty, weakness, light-headedness, numbness and headaches.   Psychiatric/Behavioral:  Negative for confusion.        Physical Exam     Initial Vitals [09/27/23 1300]   BP Pulse Resp Temp SpO2   (!) 144/65 84 17 98.1 °F (36.7 °C) 97 %      MAP       --         Physical Exam    Nursing note and vitals reviewed.  Constitutional: She appears well-developed and  well-nourished. She is not diaphoretic. No distress.   Patient sitting comfortably in bed.  Ambulatory without difficulty on exam.   HENT:   Head: Normocephalic and atraumatic.   Right Ear: External ear normal.   Left Ear: External ear normal.   Nose: Nose normal.   Mouth/Throat: Oropharynx is clear and moist and mucous membranes are normal.   Eyes: Conjunctivae and EOM are normal. Pupils are equal, round, and reactive to light. Right conjunctiva is not injected. Left conjunctiva is not injected. No scleral icterus.   Neck: Neck supple.   Normal range of motion.   Full passive range of motion without pain.     Cardiovascular:  Normal rate, regular rhythm, S1 normal, S2 normal, normal heart sounds and normal pulses.     Exam reveals no gallop and no friction rub.       No murmur heard.  Pulses:       Radial pulses are 2+ on the right side and 2+ on the left side.   Pulmonary/Chest: Effort normal and breath sounds normal. No respiratory distress.   Abdominal: Abdomen is soft. She exhibits no distension and no mass. There is no abdominal tenderness. There is no rebound and no guarding.   Musculoskeletal:         General: Tenderness present. No edema. Normal range of motion.      Cervical back: Full passive range of motion without pain, normal range of motion and neck supple.      Comments: Exam significant for left lower back pain to palpation.    Otherwise, skin is warm, dry, intact w/o effusions/edema/ erythema/ ecchymosis/ masses/lesions/ obvious deformities/ obvious muscle wasting.    2+ b/l dorsalis pedis pulses, capillary refill <2sec.     Essentially normal gait. Ambulates without assistance or assistive device. Able to transfer to and from the exam table without assistance. No obvious leg length discrepancy.    CERVICAL: Nontender to the midline spinous processes. Nontender to the paraspinal muscles b/l. Full AROM (Flexion, Extension, Lateral Flexion to the Right, Lateral Flexion to the Left,  Rotation)    THORACIC: Nontender to the midline spinous processes. Nontender to the paraspinal muscles b/l    LUMBAR: Nontender to the midline spinous processes. Nontender to the paraspinal muscles b/l. Full AROM (Flexion, Extension, Lateral Flexion to the Right, Lateral Flexion to the Left, Rotation to the Right, Rotation to the Left)     Neurological: She is oriented to person, place, and time. She has normal strength. No cranial nerve deficit. Gait normal.   A&Ox4, normal speech.   Skin: Skin is warm. No ecchymosis and no rash noted.   Psychiatric: She has a normal mood and affect. Thought content normal.         ED Course   Procedures  Labs Reviewed   POCT GLUCOSE          Imaging Results              X-Ray Hip 2 or 3 views Left (with Pelvis when performed) (Final result)  Result time 09/27/23 14:34:54      Final result by Dennis Crabtree MD (09/27/23 14:34:54)                   Impression:      1. No acute displaced fracture or dislocation of the left hip.      Electronically signed by: Dennis Crabtree MD  Date:    09/27/2023  Time:    14:34               Narrative:    EXAMINATION:  XR HIP WITH PELVIS WHEN PERFORMED, 2 OR 3 VIEWS LEFT    CLINICAL HISTORY:  Pain in unspecified hip    TECHNIQUE:  AP view of the pelvis and frog leg lateral view of the left hip were performed.    COMPARISON:  None    FINDINGS:  Two views left hip.    There is osteopenia.  The bilateral sacroiliac joints are intact noting degenerative change.  The pubic symphysis is intact noting degenerative change.  There are degenerative changes of the bilateral femoroacetabular joints without dislocation.                                       X-Ray Lumbar Spine Ap And Lateral (Final result)  Result time 09/27/23 14:34:01      Final result by Dennis Crabtree MD (09/27/23 14:34:01)                   Impression:      1. Multilevel degenerative changes of the spine, no convincing acute displaced fracture or dislocation.      Electronically  "signed by: Dennis Crabtree MD  Date:    09/27/2023  Time:    14:34               Narrative:    EXAMINATION:  XR LUMBAR SPINE AP AND LATERAL    CLINICAL HISTORY:  back pain;    TECHNIQUE:  AP, lateral and spot images were performed of the lumbar spine.    COMPARISON:  11/06/2013, CT abdomen and pelvis 05/08/2023    FINDINGS:  Three views lumbar spine.    Lateral imaging demonstrates adequate alignment of the lumbar spine without significant vertebral body height loss.  There is multilevel disc space height loss most significantly involving L2-L3, L3-L4, L4-L5 and L5-S1.  The facet joints are aligned noting lower lumbar facet arthropathy.  The sacral segments are aligned.  AP spinal alignment is remarkable for levo scoliotic curvature of the spine.  The bilateral sacroiliac joints are intact.  Surgical change projects over the abdomen.  There is calcification of the aorta.                                       Medications - No data to display    Medical Decision Making  This is an emergent evaluation of a 78 y.o. female with a Hx of chronic low back pain presenting to the ED for "sharp" low back pain that intermittently radiates down the LLE. Denies traumatic injury, Hx of IV drug use, fever, urinary retention/loss of bowel bladder control, urinary symptoms, and abdominal pain. Afebrile. Patient is non-toxic appearing and in no acute distress. Ambulatory. (+) SLR. No sensory or motor deficit.   X-ray lumbar spine with degenerative changes, no acute displaced fracture or dislocation.  X-ray hip and pelvis with osteopenia and degenerative changes, no acute displaced fracture or dislocation.  Presentation most consistent with acute lumbosacral radiculopathy. No Hx of trauma to suggest acute vertebral fracture. I doubt cauda equina, AAA rupture, and ureteral stone. I have a low suspicion for infectious etiology, including for epidural abscess and pyelonephritis. I have a lower suspicion for neoplastic etiology that will " require emergent neurosurgical intervention today.     Will offer pain control in ED. Discharged home with supportive care. Instructed the patient to follow up with PCP. Also instructed to follow up with neurosurgery and/or orthopedics for reevaluation and management of symptoms.  Patient states she has a an appointment with orthopedist on Monday.  May benefit from MRI of lumbar spine as an outpatient if symptoms persist. An educational resource on sciatica is issued to the patient.     I discussed with the patient the diagnosis, treatment plan, indications for return to the emergency department, and for expected follow-up. The patient verbalized an understanding. The patient is asked if there are any questions or concerns. We discuss the case, until all issues are addressed to the patient's satisfaction. Patient understands and is agreeable to the plan.      Amount and/or Complexity of Data Reviewed  External Data Reviewed: labs, radiology and notes.  Labs: ordered. Decision-making details documented in ED Course.  Radiology: ordered and independent interpretation performed. Decision-making details documented in ED Course.     Details: No acute displaced fractures, dislocations or other bony abnormalities.    Risk  OTC drugs.  Prescription drug management.  Diagnosis or treatment significantly limited by social determinants of health.               ED Course as of 09/27/23 1825   Wed Sep 27, 2023   1448 X-Ray Hip 2 or 3 views Left (with Pelvis when performed)  1. No acute displaced fracture or dislocation of the left hip. [CC]   1449 X-Ray Lumbar Spine Ap And Lateral     1. Multilevel degenerative changes of the spine, no convincing acute displaced fracture or dislocation. [CC]   1449 POCT Glucose: 97 [CC]      ED Course User Index  [CC] Elvira Strange PA-C                    Clinical Impression:   Final diagnoses:  [M25.559] Hip pain  [M54.30] Sciatic leg pain (Primary)        ED Disposition Condition     Discharge Stable          ED Prescriptions       Medication Sig Dispense Start Date End Date Auth. Provider    LIDOcaine (LIDODERM) 5 % Place 1 patch onto the skin once daily. Remove & Discard patch within 12 hours or as directed by MD 5 patch 9/27/2023 -- Elvira Strange PA-C    oxyCODONE (ROXICODONE) 5 MG immediate release tablet Take 1 tablet (5 mg total) by mouth every 6 (six) hours as needed for Pain. 8 tablet 9/27/2023 -- Elvira Strange PA-C    acetaminophen (TYLENOL) 325 MG tablet Take 2 tablets (650 mg total) by mouth every 6 (six) hours as needed for Pain. 12 tablet 9/27/2023 9/30/2023 Elvira Strange PA-C          Follow-up Information       Follow up With Specialties Details Why Contact Info    [facility] Emergency Dept Emergency Medicine Go to  For new or worsening symptoms [address] [place], [place] [address] [address] [phone]             Elvira Strange PA-C  09/27/23 5832

## 2023-09-27 NOTE — DISCHARGE INSTRUCTIONS
Follow up with your orthopedist on Monday.  Take anti-inflammatory such as Tylenol and Motrin as needed for pain. Apply a compressive ACE bandage. Rest and elevate the affected painful area.  Apply cold compresses intermittently as needed.  As pain recedes, begin normal activities slowly as tolerated.  Call if symptoms persist.  Narcotic pain medication reserve for persistent pain unrelieved by over-the-counter medication.  Only take if needed.  Do not drink or drive while consuming narcotic pain medication.      Thank you for coming to our Emergency Department today. It is important to remember that some problems or medical conditions are difficult to diagnose and may not be found or addressed during your Emergency Department visit.  These conditions often start with non-specific symptoms and can only be diagnosed on follow up visits with your primary care physician or specialist when the symptoms continue or change. Please remember that all medical conditions can change, and we cannot predict how you will be feeling tomorrow or the next day. Return to the ER with any questions/concerns, new/concerning symptoms, worsening or failure to improve.       Be sure to follow up with your primary care doctor and review all labs/imaging/tests that were performed during your ER visit with them. It is very common for us to identify non-emergent incidental findings which must be followed up with your primary care physician.  Some labs/imaging/tests may be outside of the normal range, and require non-emergent follow-up and/or further investigation/treatment/procedures/testing to help diagnose/exclude/prevent complications or other potentially serious medical conditions. Some abnormalities may not have been discussed or addressed during your ER visit.     An ER visit does not replace a primary care visit, and many screening tests or follow-up tests cannot be ordered by an ER doctor or performed by the ER. Some tests may even  require pre-approval.    If you do not have a primary care doctor, you may contact the one listed on your discharge paperwork or you may also call the [facility] Appointment Desk at [phone] , or [address] [place] [address] at  [phone] to schedule an appointment, or establish care with a primary care doctor or even a specialist and to obtain information about local resources. It is important to your health that you have a primary care doctor.    Please take all medications as directed. We have done our best to select a medication for you that will treat your condition however, all medications may potentially have side-effects and it is impossible to predict which medications may give you side-effects or what those side-effects (if any) those medications may give you.  If you feel that you are having a negative effect or side-effect of any medication you should stop taking those medications immediately and seek medical attention. If you feel that you are having a life-threatening reaction call 911.        Do not drive, swim, climb to height, take a bath, operate heavy machinery, drink alcohol or take potentially sedating medications, sign any legal documents or make any important decisions for 24 hours if you have received any pain medications, sedatives or mood altering drugs during your ER visit or within 24 hours of taking them if they have been prescribed to you.     You can find additional resources for Dentists, hearing aids, durable medical equipment, low cost pharmacies and other resources at [email or web address]

## 2023-09-27 NOTE — FIRST PROVIDER EVALUATION
Emergency Department TeleTriage Encounter Note      CHIEF COMPLAINT    Chief Complaint   Patient presents with    Knee Pain     L knee/hip pain. Recently dx with bakers cyst. Has ortho appt Monday but can't handle the pain       VITAL SIGNS   Initial Vitals [09/27/23 1300]   BP Pulse Resp Temp SpO2   (!) 144/65 84 17 98.1 °F (36.7 °C) 97 %      MAP       --            ALLERGIES    Review of patient's allergies indicates:   Allergen Reactions    Cortisone Shortness Of Breath     Causes heart to race    Hair formula [mv,iron,min-folic acid-biotin]      dye    Levaquin [levofloxacin] Other (See Comments)     Tongue swelling and blisters    Codeine Nausea Only    Doxycycline      Other reaction(s): Unknown       PROVIDER TRIAGE NOTE  This is a teletriage evaluation of a 78 y.o. female presenting to the ED complaining of back pain. Patient reports low back pain and left hip pain that radiates down leg. Pain acutely worsened this morning. She is having trouble walking and standing from a sitting position. She denies numbness, tingling, or weakness.    Patient is alert and oriented. She speaks in complete sentences. She is sitting upright in the chair in no distress.     Initial orders will be placed and care will be transferred to an alternate provider when patient is roomed for a full evaluation. Any additional orders and the final disposition will be determined by that provider.         ORDERS  Labs Reviewed - No data to display    ED Orders (720h ago, onward)      Start Ordered     Status Ordering Provider    09/27/23 1401 09/27/23 1400  X-Ray Hip 2 or 3 views Left (with Pelvis when performed)  1 time imaging         Ordered ZARIA BRYANT    09/27/23 1401 09/27/23 1400  X-Ray Lumbar Spine Ap And Lateral  1 time imaging         Ordered ZARIA BRYANT              Virtual Visit Note: The provider triage portion of this emergency department evaluation and documentation was performed via [employer], a HIPAA-compliant  telemedicine application, in concert with a tele-presenter in the room. A face to face patient evaluation with one of my colleagues will occur once the patient is placed in an emergency department room.      DISCLAIMER: This note was prepared with [employer] voice recognition transcription software. Garbled syntax, mangled pronouns, and other bizarre constructions may be attributed to that software system.

## 2023-09-28 ENCOUNTER — TELEPHONE (OUTPATIENT)
Dept: SPORTS MEDICINE | Facility: CLINIC | Age: 78
End: 2023-09-28
Payer: MEDICARE

## 2023-09-28 NOTE — TELEPHONE ENCOUNTER
PALMERM for pt in regards to moving her appt to Dr. Leonard Bernstein. Pt needs to be worked up before seeing a surgeon.

## 2023-09-29 ENCOUNTER — HOSPITAL ENCOUNTER (OUTPATIENT)
Dept: RADIOLOGY | Facility: HOSPITAL | Age: 78
Discharge: HOME | End: 2023-09-29
Attending: ORTHOPAEDIC SURGERY
Payer: MEDICARE

## 2023-09-29 ENCOUNTER — OFFICE VISIT (OUTPATIENT)
Dept: SPORTS MEDICINE | Facility: CLINIC | Age: 78
End: 2023-09-29
Payer: MEDICARE

## 2023-09-29 VITALS — WEIGHT: 180.25 LBS | BODY MASS INDEX: 35.2 KG/M2

## 2023-09-29 DIAGNOSIS — M17.11 PRIMARY OSTEOARTHRITIS OF RIGHT KNEE: ICD-10-CM

## 2023-09-29 DIAGNOSIS — M47.26 OSTEOARTHRITIS OF SPINE WITH RADICULOPATHY, LUMBAR REGION: ICD-10-CM

## 2023-09-29 DIAGNOSIS — M25.562 LEFT KNEE PAIN, UNSPECIFIED CHRONICITY: ICD-10-CM

## 2023-09-29 DIAGNOSIS — M17.12 PRIMARY OSTEOARTHRITIS OF LEFT KNEE: Primary | ICD-10-CM

## 2023-09-29 PROCEDURE — 73564 XR KNEE ORTHO BILAT WITH FLEXION: ICD-10-PCS | Mod: 26,50,, | Performed by: RADIOLOGY

## 2023-09-29 PROCEDURE — 99999 PR PBB SHADOW E&M-EST. PATIENT-LVL IV: CPT | Mod: PBBFAC,,, | Performed by: ORTHOPAEDIC SURGERY

## 2023-09-29 PROCEDURE — 73564 X-RAY EXAM KNEE 4 OR MORE: CPT | Mod: TC,50

## 2023-09-29 PROCEDURE — 99999 PR PBB SHADOW E&M-EST. PATIENT-LVL IV: ICD-10-PCS | Mod: PBBFAC,,, | Performed by: ORTHOPAEDIC SURGERY

## 2023-09-29 PROCEDURE — 73564 X-RAY EXAM KNEE 4 OR MORE: CPT | Mod: 26,50,, | Performed by: RADIOLOGY

## 2023-09-29 PROCEDURE — 99214 OFFICE O/P EST MOD 30 MIN: CPT | Mod: PBBFAC | Performed by: ORTHOPAEDIC SURGERY

## 2023-09-29 PROCEDURE — 99214 PR OFFICE/OUTPT VISIT, EST, LEVL IV, 30-39 MIN: ICD-10-PCS | Mod: S$PBB,,, | Performed by: ORTHOPAEDIC SURGERY

## 2023-09-29 PROCEDURE — 99214 OFFICE O/P EST MOD 30 MIN: CPT | Mod: S$PBB,,, | Performed by: ORTHOPAEDIC SURGERY

## 2023-09-29 NOTE — PROGRESS NOTES
Subjective:     Chief Complaint: Iram Aguirre is a 78 y.o. female who had no chief complaint listed for this encounter.    HPI    Patient presents to clinic with chronic left knee pain x several years. Patient states the pain began gradually and is localized along the lateral aspect of the knee at the joint line. She denies any WILLIAM or traumatic event.  She states she is previously seen with PA providers and undergone injections as well as physical therapy.  She is had good success with viscosupplementation injections in the past.  She rates the pain as 5/10.  She has attempted multiple conservative measures that include activity modification, ice & elevation, and anti-inflammatories with little relief. Is affecting ADLs and limiting desired level of activity. Denies numbness, tingling, radiation, and inability to bear weight. She is here today to discuss treatment options.    No previous surgeries or trauma on bilateral knees    Review of Systems   Constitutional: Negative.   HENT: Negative.     Eyes: Negative.    Cardiovascular: Negative.    Respiratory: Negative.     Endocrine: Negative.    Hematologic/Lymphatic: Negative.    Skin: Negative.    Musculoskeletal:  Positive for arthritis, joint pain, muscle weakness and stiffness. Negative for falls and joint swelling.   Neurological: Negative.    Psychiatric/Behavioral: Negative.     Allergic/Immunologic: Negative.                  Objective:     General: Iram is well-developed, well-nourished, appears stated age, in no acute distress, alert and oriented to time, place and person.     General    Nursing note and vitals reviewed.  Constitutional: She is oriented to person, place, and time. She appears well-developed and well-nourished. No distress.   HENT:   Head: Normocephalic and atraumatic.   Nose: Nose normal.   Eyes: EOM are normal.   Cardiovascular:  Intact distal pulses.            Pulmonary/Chest: Effort normal. No respiratory distress.    Neurological: She is alert and oriented to person, place, and time.   Psychiatric: She has a normal mood and affect. Her behavior is normal. Judgment and thought content normal.           Right Knee Exam     Inspection   Erythema: absent  Scars: absent  Swelling: absent  Effusion: absent  Deformity: absent  Bruising: absent    Tenderness   The patient is experiencing no tenderness.     Range of Motion   Extension:  -5   Flexion:  140     Tests   Meniscus   Molina:  Medial - negative Lateral - negative  Ligament Examination   Lachman: normal (-1 to 2mm)   PCL-Posterior Drawer: normal (0 to 2mm)     MCL - Valgus: normal (0 to 2mm)  LCL - Varus: normal  Patella   Patellar apprehension: negative  Passive Patellar Tilt: neutral  Patellar Tracking: normal  Patellar Grind: negative    Other   Sensation: normal    Left Knee Exam     Inspection   Erythema: absent  Scars: absent  Swelling: absent  Effusion: absent  Deformity: absent  Bruising: absent    Tenderness   The patient tender to palpation of the medial joint line and lateral joint line.    Range of Motion   Extension:  0   Flexion:  120     Tests   Meniscus   Molina:  Medial - negative Lateral - negative  Stability   Lachman: normal (-1 to 2mm)   PCL-Posterior Drawer: normal (0 to 2mm)  MCL - Valgus: normal (0 to 2mm)  LCL - Varus: normal (0 to 2mm)  Patella   Patellar apprehension: negative  Passive Patellar Tilt: neutral  Patellar Tracking: normal  Patellar Grind: negative    Other   Sensation: normal    Muscle Strength   Right Lower Extremity   Quadriceps:  5/5   Hamstrin/5   Left Lower Extremity   Quadriceps:  5/5   Hamstrin/5     Vascular Exam     Right Pulses  Dorsalis Pedis:      2+  Posterior Tibial:      2+        Left Pulses  Dorsalis Pedis:      2+  Posterior Tibial:      2+        Radiographs bilateral knee     My interpretation:    Moderate joint space narrowing seen in medial and lateral compartments of both knees with worse in lateral  compartment of left knee.  Tricompartmental osteophyte formation    Kellgren Vladimir grade 4, bilateral      Assessment:     Encounter Diagnoses   Name Primary?    Left knee pain, unspecified chronicity     Primary osteoarthritis of left knee Yes    Osteoarthritis of spine with radiculopathy, lumbar region         Plan:       1. I made the decision to order new imaging of the extremity or extremities evaluated. I independently reviewed and interpreted the radiographs and/or MRIs today. These images were shown to the patient where I then discussed my findings in detail.    2. We have discussed a variety of treatment options including medications, injections, physical therapy and other alternative treatments. I also explained the indications, risks and benefits of surgery. Patient's pain is refractory HEP, conservative management, and NSAIDs. Had a lengthy discussion about osteoarthritis in the knees to include the primary causes to include excessive weight, trauma, genetics, and muscle weakness as well as the different forms of treatment used to help alleviate symptoms including CSI, physical therapy, and Visco supplementation injections.  At this time, Pt would like to proceed with  Durolane, Left knee .    3. Pre-authorization placed for  Durolane  injection.    4. Ice compress to the affected area 2-3x a day for 15-20 minutes as needed for pain management.  Anti-inflammatories prn pain.    5. RTC to see Negro Bernstein PA-C in 2 weeks for Durolane injection, left knee.    All of the patient's questions were answered and the patient will contact us if they have any questions or concerns in the interim.        Patient questionnaires may have been collected.

## 2023-10-06 ENCOUNTER — TELEPHONE (OUTPATIENT)
Dept: CARDIOLOGY | Facility: HOSPITAL | Age: 78
End: 2023-10-06

## 2023-10-06 NOTE — TELEPHONE ENCOUNTER
Nuclear Stress (talked to patient)  Patient advised, test will be at [facility] ([address]  Will need to register on the first floor at the main entrance.  Patient advised that arrival time is 6:20.  Patient advised that she may be here about 3.5-4 hours, and may want to bring something to occupy their time, as there will be periods of waiting.    Patient advised, may take her medications prior to testing if you need to.  Patient should HOLD Isosorbide. Advised if she needs to eat to take her medications, please keep it light, like toast and juice.    Patient advised to avoid all caffeine 12 hours prior to testing.  This includes decaf tea and coffee.    Will provide peanut butter crackers for a snack after stress test.  If patient would prefer something else, please bring a snack from home.    Wear comfortable clothing.  No lotions, oils, or powders to the upper chest area. May wear deodorant.    No metal jewelry, buttons, or zippers to the upper body.  Patient verbalizes understanding of instructions.

## 2023-10-09 ENCOUNTER — HOSPITAL ENCOUNTER (OUTPATIENT)
Dept: CARDIOLOGY | Facility: HOSPITAL | Age: 78
Discharge: HOME | End: 2023-10-09
Attending: NURSE PRACTITIONER
Payer: MEDICARE

## 2023-10-09 ENCOUNTER — HOSPITAL ENCOUNTER (OUTPATIENT)
Dept: RADIOLOGY | Facility: HOSPITAL | Age: 78
Discharge: HOME | End: 2023-10-09
Attending: NURSE PRACTITIONER
Payer: MEDICARE

## 2023-10-09 DIAGNOSIS — R07.9 CHEST PAIN, UNSPECIFIED TYPE: ICD-10-CM

## 2023-10-09 PROCEDURE — 78452 NUCLEAR STRESS - CARDIOLOGY INTERPRETED (CUPID ONLY): ICD-10-PCS | Mod: 26,,, | Performed by: INTERNAL MEDICINE

## 2023-10-09 PROCEDURE — 93018 CV STRESS TEST I&R ONLY: CPT | Mod: ,,, | Performed by: INTERNAL MEDICINE

## 2023-10-09 PROCEDURE — 93017 CV STRESS TEST TRACING ONLY: CPT

## 2023-10-09 PROCEDURE — 93018 NUCLEAR STRESS - CARDIOLOGY INTERPRETED (CUPID ONLY): ICD-10-PCS | Mod: ,,, | Performed by: INTERNAL MEDICINE

## 2023-10-09 PROCEDURE — 78452 HT MUSCLE IMAGE SPECT MULT: CPT | Mod: 26,,, | Performed by: INTERNAL MEDICINE

## 2023-10-09 PROCEDURE — A9502 TC99M TETROFOSMIN: HCPCS

## 2023-10-09 PROCEDURE — 93016 CV STRESS TEST SUPVJ ONLY: CPT | Mod: ,,, | Performed by: NURSE PRACTITIONER

## 2023-10-09 PROCEDURE — 93016 NUCLEAR STRESS - CARDIOLOGY INTERPRETED (CUPID ONLY): ICD-10-PCS | Mod: ,,, | Performed by: NURSE PRACTITIONER

## 2023-10-09 RX ORDER — REGADENOSON 0.08 MG/ML
0.4 INJECTION, SOLUTION INTRAVENOUS ONCE
Status: COMPLETED | OUTPATIENT
Start: 2023-10-09 | End: 2023-10-09

## 2023-10-09 RX ADMIN — REGADENOSON 0.4 MG: 0.08 INJECTION, SOLUTION INTRAVENOUS at 08:10

## 2023-10-11 LAB
CV PHARM DOSE: 0.4 MG
CV STRESS BASE HR: 67 BPM
DIASTOLIC BLOOD PRESSURE: 80 MMHG
EJECTION FRACTION- HIGH: 65 %
END DIASTOLIC INDEX-HIGH: 153 ML/M2
END DIASTOLIC INDEX-LOW: 93 ML/M2
END SYSTOLIC INDEX-HIGH: 71 ML/M2
END SYSTOLIC INDEX-LOW: 31 ML/M2
NUC REST DIASTOLIC VOLUME INDEX: 67
NUC REST EJECTION FRACTION: 90
NUC REST SYSTOLIC VOLUME INDEX: 7
NUC STRESS DIASTOLIC VOLUME INDEX: 73
NUC STRESS EJECTION FRACTION: 73 %
NUC STRESS SYSTOLIC VOLUME INDEX: 20
OHS CV CPX 1 MINUTE RECOVERY HEART RATE: 88 BPM
OHS CV CPX 85 PERCENT MAX PREDICTED HEART RATE MALE: 118
OHS CV CPX MAX PREDICTED HEART RATE: 138
OHS CV CPX PATIENT IS FEMALE: 1
OHS CV CPX PATIENT IS MALE: 0
OHS CV CPX PEAK DIASTOLIC BLOOD PRESSURE: 68 MMHG
OHS CV CPX PEAK HEAR RATE: 88 BPM
OHS CV CPX PEAK RATE PRESSURE PRODUCT: NORMAL
OHS CV CPX PEAK SYSTOLIC BLOOD PRESSURE: 164 MMHG
OHS CV CPX PERCENT MAX PREDICTED HEART RATE ACHIEVED: 64
OHS CV CPX RATE PRESSURE PRODUCT PRESENTING: NORMAL
RETIRED EF AND QEF - SEE NOTES: 53 %
SYSTOLIC BLOOD PRESSURE: 172 MMHG

## 2023-10-12 ENCOUNTER — TELEPHONE (OUTPATIENT)
Dept: CARDIOLOGY | Facility: CLINIC | Age: 78
End: 2023-10-12
Payer: MEDICARE

## 2023-10-12 NOTE — TELEPHONE ENCOUNTER
----- Message from Carmela Santos NP sent at 9/26/2023  9:58 AM CDT -----  Send to orthopedic  NO CLOT  You do have a Backer Cyst however.   Best treatment for this is heat/icd  Exercises to maintain knee mobility  Could also drain it or get steroid shot   Can send to orthopedic

## 2023-10-17 ENCOUNTER — PATIENT MESSAGE (OUTPATIENT)
Dept: SPORTS MEDICINE | Facility: CLINIC | Age: 78
End: 2023-10-17

## 2023-10-17 ENCOUNTER — OFFICE VISIT (OUTPATIENT)
Dept: SPORTS MEDICINE | Facility: CLINIC | Age: 78
End: 2023-10-17
Payer: MEDICARE

## 2023-10-17 ENCOUNTER — OFFICE VISIT (OUTPATIENT)
Dept: OPHTHALMOLOGY | Facility: CLINIC | Age: 78
End: 2023-10-17
Payer: MEDICARE

## 2023-10-17 VITALS — BODY MASS INDEX: 35.4 KG/M2 | HEIGHT: 60 IN | WEIGHT: 180.31 LBS

## 2023-10-17 DIAGNOSIS — M17.11 PRIMARY OSTEOARTHRITIS OF RIGHT KNEE: ICD-10-CM

## 2023-10-17 DIAGNOSIS — Z96.1 PSEUDOPHAKIA OF BOTH EYES: ICD-10-CM

## 2023-10-17 DIAGNOSIS — H04.123 DRY EYE SYNDROME OF BOTH EYES: ICD-10-CM

## 2023-10-17 DIAGNOSIS — H43.813 VITREOUS DEGENERATION OF BOTH EYES: Primary | ICD-10-CM

## 2023-10-17 DIAGNOSIS — M17.12 PRIMARY OSTEOARTHRITIS OF LEFT KNEE: Primary | ICD-10-CM

## 2023-10-17 PROCEDURE — 92004 PR EYE EXAM, NEW PATIENT,COMPREHESV: ICD-10-PCS | Mod: S$PBB,,, | Performed by: OPHTHALMOLOGY

## 2023-10-17 PROCEDURE — 92134 OCT, RETINA - OU - BOTH EYES: ICD-10-PCS | Mod: 26,S$PBB,, | Performed by: OPHTHALMOLOGY

## 2023-10-17 PROCEDURE — 99499 UNLISTED E&M SERVICE: CPT | Mod: S$PBB,,, | Performed by: ORTHOPAEDIC SURGERY

## 2023-10-17 PROCEDURE — 92134 CPTRZ OPH DX IMG PST SGM RTA: CPT | Mod: PBBFAC | Performed by: OPHTHALMOLOGY

## 2023-10-17 PROCEDURE — 99499 NO LOS: ICD-10-PCS | Mod: S$PBB,,, | Performed by: ORTHOPAEDIC SURGERY

## 2023-10-17 PROCEDURE — 20610 LARGE JOINT ASPIRATION/INJECTION: L KNEE: ICD-10-PCS | Mod: S$PBB,LT,, | Performed by: ORTHOPAEDIC SURGERY

## 2023-10-17 PROCEDURE — 20610 DRAIN/INJ JOINT/BURSA W/O US: CPT | Mod: PBBFAC,LT | Performed by: ORTHOPAEDIC SURGERY

## 2023-10-17 PROCEDURE — 99999 PR PBB SHADOW E&M-EST. PATIENT-LVL IV: ICD-10-PCS | Mod: PBBFAC,,, | Performed by: ORTHOPAEDIC SURGERY

## 2023-10-17 PROCEDURE — 99999PBSHW PR PBB SHADOW TECHNICAL ONLY FILED TO HB: ICD-10-PCS | Mod: JZ,PBBFAC,,

## 2023-10-17 PROCEDURE — 99999PBSHW PR PBB SHADOW TECHNICAL ONLY FILED TO HB: Mod: JZ,PBBFAC,,

## 2023-10-17 PROCEDURE — 99999 PR PBB SHADOW E&M-EST. PATIENT-LVL III: ICD-10-PCS | Mod: PBBFAC,,, | Performed by: OPHTHALMOLOGY

## 2023-10-17 PROCEDURE — 99999 PR PBB SHADOW E&M-EST. PATIENT-LVL III: CPT | Mod: PBBFAC,,, | Performed by: OPHTHALMOLOGY

## 2023-10-17 PROCEDURE — 20610 DRAIN/INJ JOINT/BURSA W/O US: CPT | Mod: S$PBB,LT,, | Performed by: ORTHOPAEDIC SURGERY

## 2023-10-17 PROCEDURE — 92004 COMPRE OPH EXAM NEW PT 1/>: CPT | Mod: S$PBB,,, | Performed by: OPHTHALMOLOGY

## 2023-10-17 PROCEDURE — 99214 OFFICE O/P EST MOD 30 MIN: CPT | Mod: PBBFAC | Performed by: ORTHOPAEDIC SURGERY

## 2023-10-17 PROCEDURE — 99213 OFFICE O/P EST LOW 20 MIN: CPT | Mod: PBBFAC,27 | Performed by: OPHTHALMOLOGY

## 2023-10-17 PROCEDURE — 99999 PR PBB SHADOW E&M-EST. PATIENT-LVL IV: CPT | Mod: PBBFAC,,, | Performed by: ORTHOPAEDIC SURGERY

## 2023-10-17 RX ORDER — MULTIVITAMIN
1 TABLET ORAL DAILY
COMMUNITY

## 2023-10-17 RX ORDER — MECOBALAMIN 1000 MCG
1000 TABLET,CHEWABLE ORAL DAILY
COMMUNITY

## 2023-10-17 RX ORDER — POTASSIUM CHLORIDE 750 MG/1
10 TABLET, EXTENDED RELEASE ORAL DAILY
COMMUNITY
Start: 2021-11-02

## 2023-10-17 RX ORDER — VITAMIN E 268 MG
1 CAPSULE ORAL DAILY
COMMUNITY

## 2023-10-17 RX ORDER — EPINEPHRINE 0.22MG
100 AEROSOL WITH ADAPTER (ML) INHALATION DAILY
COMMUNITY

## 2023-10-17 RX ORDER — NAPROXEN 500 MG/1
1 TABLET ORAL
COMMUNITY

## 2023-10-17 RX ORDER — ISOSORBIDE MONONITRATE 30 MG/1
30 TABLET, EXTENDED RELEASE ORAL DAILY
COMMUNITY
Start: 2021-11-22 | End: 2023-12-14 | Stop reason: SDUPTHER

## 2023-10-17 RX ORDER — EPINEPHRINE 0.3 MG/.3ML
0.3 INJECTION SUBCUTANEOUS
COMMUNITY
Start: 2023-08-18

## 2023-10-17 RX ORDER — FAMOTIDINE 40 MG/1
40 TABLET, FILM COATED ORAL
COMMUNITY
Start: 2023-05-04 | End: 2023-12-14 | Stop reason: SDUPTHER

## 2023-10-17 RX ORDER — AZELASTINE 1 MG/ML
137 SPRAY, METERED NASAL DAILY
COMMUNITY
Start: 2022-11-01

## 2023-10-17 RX ORDER — ACETAMINOPHEN 500 MG
1 TABLET ORAL NIGHTLY
COMMUNITY

## 2023-10-17 RX ORDER — HYDROCODONE BITARTRATE AND ACETAMINOPHEN 5; 325 MG/1; MG/1
5 TABLET ORAL
COMMUNITY

## 2023-10-17 RX ORDER — SEMAGLUTIDE 0.68 MG/ML
0.25 INJECTION, SOLUTION SUBCUTANEOUS WEEKLY
COMMUNITY
Start: 2023-06-14 | End: 2023-12-14

## 2023-10-17 RX ORDER — METRONIDAZOLE 500 MG/1
500 TABLET ORAL
COMMUNITY

## 2023-10-17 RX ORDER — LEVOMEFOLATE CALCIUM 15 MG
15 TABLET ORAL DAILY
COMMUNITY

## 2023-10-17 RX ORDER — MUPIROCIN 20 MG/G
2 OINTMENT TOPICAL
COMMUNITY

## 2023-10-17 RX ORDER — ECHINACEA 380 MG
25 CAPSULE ORAL DAILY
COMMUNITY
End: 2023-12-14 | Stop reason: SDUPTHER

## 2023-10-17 RX ORDER — TURMERIC 100 %
1 POWDER (GRAM) MISCELLANEOUS DAILY
COMMUNITY

## 2023-10-17 RX ADMIN — Medication 60 MG: at 11:10

## 2023-10-17 NOTE — PROCEDURES
Large Joint Aspiration/Injection: L knee    Date/Time: 10/17/2023 11:30 AM    Performed by: Jefferson Bernstein PA-C  Authorized by: Jefferson Bernstein PA-C    Consent Done?:  Yes (Verbal)  Indications:  Arthritis and pain  Site marked: the procedure site was marked    Timeout: prior to procedure the correct patient, procedure, and site was verified    Prep: patient was prepped and draped in usual sterile fashion      Local anesthesia used?: Yes    Local anesthetic:  Lidocaine spray    Details:  Needle Size:  22 G  Ultrasonic Guidance for needle placement?: No    Approach:  Anterolateral  Location:  Knee  Site:  L knee  Medications:  60 mg hyaluronate sodium, stabilized (DUROLANE) 60 mg/3 mL  Patient tolerance:  Patient tolerated the procedure well with no immediate complications

## 2023-10-17 NOTE — PROGRESS NOTES
Patient is here for follow up of bilateral knee arthritis. Pt is requesting Durolane for Left knee.  [facility] Has failed other conservative modalities including NSAIDS, activity modification, weight loss.    The prior shots were tolerated well.    PHYSICAL EXAMINATION:     General: The patient is alert and oriented x 3. Mood is pleasant.   Observation of ears, eyes and nose reveals no gross abnormalities. No   labored breathing observed.     No signs of infection or adverse reaction to knee.    Patient had no adverse reactions to the medication. Pain decreased. Patient was instructed to apply ice to the joint for 20 minutes and avoid strenuous activities for 24-36 hours following the injection. They were warned of possible blood sugar and/or blood pressure changes during that time. Following that time, they can resume regular activities.    Patient was reminded to call the clinic immediately for any adverse side effects as explained in clinic today.      RTC in 12 week with Jefferson Bernstein PA-C for f/u.    All questions were answered, pt will contact us for questions or concerns in the interim.

## 2023-10-17 NOTE — PROGRESS NOTES
"HPI    Referral by NP - Carmela Santos ([place] location )    CC: pt state my left eye doesn't focus as good as I was expecting.     -flashes/floaters  -diplopia  -eye pain  -headaches  -curtain/shadow/veils    Eye Meds: AT"s OU prn (gel)    POHx:   1. ABIGAIL OU  2. Myogenic Ptosis OU   3. Essential HTN  4 Combined forms of AR Cataract OU    S/P CE OS 3-4 yrs ago           CE OD  oct/2022 by Dr. Marlon Fu MD ([place] [facility])  Last edited by Kiya Ledesma MA on 10/17/2023 12:55 PM.         A/P    ICD-10-CM ICD-9-CM   1. Vitreous degeneration of both eyes  H43.813 379.21   2. Pseudophakia of both eyes  Z96.1 V43.1   3. Dry eye syndrome of both eyes  H04.123 375.15       1. Vitreous degeneration of both eyes  Here for retina check  Pt notices occ changes/veil in vision    Exam today no RT/RD   Plan: Observation  Pathology of PVD, Retinal Tear, Retinal Detachment reviewed in great detail  RD precautions discussed in detail, patient expressed understanding  RTC immediately PRN (especially ANY change flashes, floaters, vision, visual field)     2. Pseudophakia of both eyes  Good lens position OU  Plan: Observation, update Mrx prn    3. Dry eye syndrome of both eyes  Mild dry eye  Rec ATs prn    RTC Optom prn        I saw and examined the patient and reviewed in detail the findings documented. The final examination findings, image interpretations which have been independently interpreted, and plan as documented in the record represent my personal judgment and conclusions.    Shemar Torre MD  Vitreoretinal Surgery   [facility]   "

## 2023-10-18 ENCOUNTER — PATIENT MESSAGE (OUTPATIENT)
Dept: OPTOMETRY | Facility: CLINIC | Age: 78
End: 2023-10-18
Payer: MEDICARE

## 2023-10-19 ENCOUNTER — TELEPHONE (OUTPATIENT)
Dept: CARDIOLOGY | Facility: CLINIC | Age: 78
End: 2023-10-19
Payer: MEDICARE

## 2023-11-07 ENCOUNTER — OFFICE VISIT (OUTPATIENT)
Dept: SPORTS MEDICINE | Facility: CLINIC | Age: 78
End: 2023-11-07
Payer: MEDICARE

## 2023-11-07 VITALS
HEART RATE: 88 BPM | HEIGHT: 60 IN | WEIGHT: 178.56 LBS | DIASTOLIC BLOOD PRESSURE: 64 MMHG | BODY MASS INDEX: 35.05 KG/M2 | SYSTOLIC BLOOD PRESSURE: 139 MMHG

## 2023-11-07 DIAGNOSIS — M17.11 PRIMARY OSTEOARTHRITIS OF RIGHT KNEE: Primary | ICD-10-CM

## 2023-11-07 PROCEDURE — 20610 DRAIN/INJ JOINT/BURSA W/O US: CPT | Mod: PBBFAC,RT | Performed by: ORTHOPAEDIC SURGERY

## 2023-11-07 PROCEDURE — 20610 LARGE JOINT ASPIRATION/INJECTION: R KNEE: ICD-10-PCS | Mod: S$PBB,RT,, | Performed by: ORTHOPAEDIC SURGERY

## 2023-11-07 PROCEDURE — 99999PBSHW PR PBB SHADOW TECHNICAL ONLY FILED TO HB: ICD-10-PCS | Mod: JZ,PBBFAC,,

## 2023-11-07 PROCEDURE — 99214 OFFICE O/P EST MOD 30 MIN: CPT | Mod: PBBFAC,25 | Performed by: ORTHOPAEDIC SURGERY

## 2023-11-07 PROCEDURE — 99999 PR PBB SHADOW E&M-EST. PATIENT-LVL IV: ICD-10-PCS | Mod: PBBFAC,,, | Performed by: ORTHOPAEDIC SURGERY

## 2023-11-07 PROCEDURE — 99999PBSHW PR PBB SHADOW TECHNICAL ONLY FILED TO HB: Mod: JZ,PBBFAC,,

## 2023-11-07 PROCEDURE — 99999 PR PBB SHADOW E&M-EST. PATIENT-LVL IV: CPT | Mod: PBBFAC,,, | Performed by: ORTHOPAEDIC SURGERY

## 2023-11-07 PROCEDURE — 99499 UNLISTED E&M SERVICE: CPT | Mod: S$PBB,,, | Performed by: ORTHOPAEDIC SURGERY

## 2023-11-07 PROCEDURE — 99499 NO LOS: ICD-10-PCS | Mod: S$PBB,,, | Performed by: ORTHOPAEDIC SURGERY

## 2023-11-07 PROCEDURE — 20610 DRAIN/INJ JOINT/BURSA W/O US: CPT | Mod: S$PBB,RT,, | Performed by: ORTHOPAEDIC SURGERY

## 2023-11-07 RX ADMIN — Medication 60 MG: at 02:11

## 2023-11-07 NOTE — PROGRESS NOTES
Patient is here for follow up of Right knee arthritis. Pt is requesting Durolane for Right knee.  [facility] Has failed other conservative modalities including NSAIDS, activity modification, weight loss.    The prior shots were tolerated well.    PHYSICAL EXAMINATION:     General: The patient is alert and oriented x 3. Mood is pleasant.   Observation of ears, eyes and nose reveals no gross abnormalities. No   labored breathing observed.     No signs of infection or adverse reaction to knee.    Patient had no adverse reactions to the medication. Pain decreased. Patient was instructed to apply ice to the joint for 20 minutes and avoid strenuous activities for 24-36 hours following the injection. They were warned of possible blood sugar and/or blood pressure changes during that time. Following that time, they can resume regular activities.    Patient was reminded to call the clinic immediately for any adverse side effects as explained in clinic today.      RTC in 12 week with Jefferson Bernstein PA-C for f/u.    All questions were answered, pt will contact us for questions or concerns in the interim.

## 2023-11-07 NOTE — PROCEDURES
Large Joint Aspiration/Injection: R knee    Date/Time: 11/7/2023 2:45 PM    Performed by: Jefferson Bernstein PA-C  Authorized by: Jefferson Bernstein PA-C    Consent Done?:  Yes (Verbal)  Indications:  Arthritis and pain  Site marked: the procedure site was marked    Timeout: prior to procedure the correct patient, procedure, and site was verified    Prep: patient was prepped and draped in usual sterile fashion      Local anesthesia used?: Yes    Local anesthetic:  Lidocaine spray    Details:  Needle Size:  22 G  Ultrasonic Guidance for needle placement?: No    Approach:  Anterolateral  Location:  Knee  Site:  R knee  Medications:  60 mg hyaluronate sodium, stabilized (DUROLANE) 60 mg/3 mL  Patient tolerance:  Patient tolerated the procedure well with no immediate complications

## 2023-11-09 ENCOUNTER — HOSPITAL ENCOUNTER (EMERGENCY)
Facility: HOSPITAL | Age: 78
Discharge: HOME | End: 2023-11-09
Attending: EMERGENCY MEDICINE
Payer: MEDICARE

## 2023-11-09 VITALS
OXYGEN SATURATION: 96 % | TEMPERATURE: 99 F | RESPIRATION RATE: 20 BRPM | HEIGHT: 60 IN | DIASTOLIC BLOOD PRESSURE: 74 MMHG | BODY MASS INDEX: 34.95 KG/M2 | HEART RATE: 81 BPM | SYSTOLIC BLOOD PRESSURE: 187 MMHG | WEIGHT: 178 LBS

## 2023-11-09 DIAGNOSIS — J10.1 INFLUENZA A: Primary | ICD-10-CM

## 2023-11-09 DIAGNOSIS — J98.01 BRONCHOSPASM: ICD-10-CM

## 2023-11-09 DIAGNOSIS — J06.9 VIRAL URI WITH COUGH: ICD-10-CM

## 2023-11-09 LAB
ALBUMIN SERPL-MCNC: 3.9 G/DL (ref 3.3–5.5)
ALP SERPL-CCNC: 66 U/L (ref 42–141)
BILIRUB SERPL-MCNC: 0.5 MG/DL (ref 0.2–1.6)
BUN SERPL-MCNC: 12 MG/DL (ref 7–22)
CALCIUM SERPL-MCNC: 9.5 MG/DL (ref 8–10.3)
CHLORIDE SERPL-SCNC: 104 MMOL/L (ref 98–108)
CREAT SERPL-MCNC: 0.9 MG/DL (ref 0.6–1.2)
CTP QC/QA: YES
GLUCOSE SERPL-MCNC: 102 MG/DL (ref 73–118)
HCT, POC: NORMAL
HGB, POC: NORMAL (ref 14–18)
INFLUENZA A ANTIGEN, POC: POSITIVE
INFLUENZA B ANTIGEN, POC: NEGATIVE
MCH, POC: NORMAL
MCHC, POC: NORMAL
MCV, POC: NORMAL
MPV, POC: NORMAL
POC ALT (SGPT): 32 U/L (ref 10–47)
POC AST (SGOT): 46 U/L (ref 11–38)
POC B-TYPE NATRIURETIC PEPTIDE: 21.5 PG/ML (ref 0–100)
POC CARDIAC TROPONIN I: 0 NG/ML (ref 0–0.08)
POC PLATELET COUNT: NORMAL
POC RAPID STREP A: NEGATIVE
POC TCO2: 26 MMOL/L (ref 18–33)
POTASSIUM BLD-SCNC: 4.5 MMOL/L (ref 3.6–5.1)
PROTEIN, POC: 6.9 G/DL (ref 6.4–8.1)
RBC, POC: NORMAL
RDW, POC: NORMAL
SAMPLE: NORMAL
SARS-COV-2 RDRP RESP QL NAA+PROBE: NEGATIVE
SODIUM BLD-SCNC: 133 MMOL/L (ref 128–145)
WBC, POC: NORMAL

## 2023-11-09 PROCEDURE — 87804 INFLUENZA ASSAY W/OPTIC: CPT | Mod: 59,ER

## 2023-11-09 PROCEDURE — 94640 AIRWAY INHALATION TREATMENT: CPT | Mod: ER

## 2023-11-09 PROCEDURE — 99284 EMERGENCY DEPT VISIT MOD MDM: CPT | Mod: 25,ER

## 2023-11-09 PROCEDURE — 84484 ASSAY OF TROPONIN QUANT: CPT | Mod: ER

## 2023-11-09 PROCEDURE — 25000242 PHARM REV CODE 250 ALT 637 W/ HCPCS: Mod: ER | Performed by: EMERGENCY MEDICINE

## 2023-11-09 PROCEDURE — 87635 SARS-COV-2 COVID-19 AMP PRB: CPT | Mod: ER | Performed by: EMERGENCY MEDICINE

## 2023-11-09 PROCEDURE — 83880 ASSAY OF NATRIURETIC PEPTIDE: CPT | Mod: ER

## 2023-11-09 PROCEDURE — 80053 COMPREHEN METABOLIC PANEL: CPT | Mod: ER

## 2023-11-09 PROCEDURE — 27100098 HC SPACER: Mod: ER

## 2023-11-09 RX ORDER — ALBUTEROL SULFATE 90 UG/1
2 AEROSOL, METERED RESPIRATORY (INHALATION) EVERY 4 HOURS PRN
Qty: 8 G | Refills: 1 | Status: SHIPPED | OUTPATIENT
Start: 2023-11-09

## 2023-11-09 RX ORDER — ALBUTEROL SULFATE 2.5 MG/.5ML
5 SOLUTION RESPIRATORY (INHALATION)
Status: COMPLETED | OUTPATIENT
Start: 2023-11-09 | End: 2023-11-09

## 2023-11-09 RX ORDER — ACETAMINOPHEN 500 MG
1000 TABLET ORAL EVERY 8 HOURS PRN
Qty: 40 TABLET | Refills: 0 | Status: SHIPPED | OUTPATIENT
Start: 2023-11-09

## 2023-11-09 RX ORDER — IPRATROPIUM BROMIDE AND ALBUTEROL SULFATE 2.5; .5 MG/3ML; MG/3ML
3 SOLUTION RESPIRATORY (INHALATION)
Status: COMPLETED | OUTPATIENT
Start: 2023-11-09 | End: 2023-11-09

## 2023-11-09 RX ORDER — PROMETHAZINE HYDROCHLORIDE AND DEXTROMETHORPHAN HYDROBROMIDE 6.25; 15 MG/5ML; MG/5ML
5 SYRUP ORAL EVERY 6 HOURS PRN
Qty: 180 ML | Refills: 0 | Status: SHIPPED | OUTPATIENT
Start: 2023-11-09 | End: 2023-11-19

## 2023-11-09 RX ADMIN — IPRATROPIUM BROMIDE AND ALBUTEROL SULFATE 3 ML: 2.5; .5 SOLUTION RESPIRATORY (INHALATION) at 02:11

## 2023-11-09 RX ADMIN — ALBUTEROL SULFATE 5 MG: 2.5 SOLUTION RESPIRATORY (INHALATION) at 03:11

## 2023-11-09 NOTE — ED PROVIDER NOTES
Encounter Date: 11/9/2023    SCRIBE #1 NOTE: I, Analy Ma, am scribing for, and in the presence of,  Marko Thomas MD. I have scribed the following portions of the note - Other sections scribed: HPI, ROS.       History     Chief Complaint   Patient presents with    URI     Patient presents w/ a c/o of URI symptoms (cough, congestion, fever, and wheezing for two days). Denies any known sick contacts. GCS 15. VSS.     78 y.o. female, with a PMHx of GERD, HLD, RUDY on CPAP, presents to ED with a c/o rhinorrhea x3 days. Patient reports associated postnasal drip, productive cough (started out as dark green mucus, now yellow mucus), wheezing, bilateral eye swelling, fever (102F at home), and a slight sore throat. No other exacerbating or alleviating factors. Patient denies shortness of breath, chest pain, abdominal pain, nausea, vomiting, diarrhea, dysuria, headaches, arm or leg trouble, eye pain, ear pain, rash, or other associated symptoms.    The history is provided by the patient. No  was used.     Review of patient's allergies indicates:   Allergen Reactions    Cortisone Shortness Of Breath     Causes heart to race    Hair formula [mv,iron,min-folic acid-biotin]      dye    Levaquin [levofloxacin] Other (See Comments)     Tongue swelling and blisters    Codeine Nausea Only    Doxycycline      Other reaction(s): Unknown     Past Medical History:   Diagnosis Date    AR (allergic rhinitis) 11/29/2012    Benign hypertension     Depression     Diverticulosis 2005    Esophageal polyp     Fibromyalgia     Gastric ulcer; benign 2007    GERD (gastroesophageal reflux disease) 11/29/2012    Hypercholesteremia     Hypothyroidism     Kidney stones     MTHFR gene mutation     per daughter E72.8    Obstructive sleep apnea on CPAP     Osteopenia     Rectal polyp     Skin cancer     pre skin cancer skin    Vulvovaginal melanosis      Past Surgical History:   Procedure Laterality Date    CHOLECYSTECTOMY   08/08/2012    CHOLECYSTECTOMY      COLONOSCOPY N/A 2/6/2017    Procedure: COLONOSCOPY;  Surgeon: Arturo Crane MD;  Location: [facility];  Service: Endoscopy;  Laterality: N/A;    FOOT FRACTURE SURGERY  2005    plate in right foot    GANGLION CYST EXCISION  1980    left wrist    HYSTERECTOMY      KNEE ARTHROSCOPY W/ ACL RECONSTRUCTION      bilateral     NASAL SEPTUM SURGERY      TEAR DUCT SURGERY  2006    left eye    TOTAL KNEE ARTHROPLASTY      bilateral    TOTAL VAGINAL HYSTERECTOMY  age 31    w/BSO    TUBAL LIGATION  age 26     Family History   Problem Relation Age of Onset    Heart disease Father     Kidney disease Mother      Social History     Tobacco Use    Smoking status: Never    Smokeless tobacco: Never   Substance Use Topics    Alcohol use: No    Drug use: No     Review of Systems   Constitutional:  Positive for fever. Negative for diaphoresis.   HENT:  Positive for congestion, postnasal drip, rhinorrhea and sore throat. Negative for ear pain.    Eyes:  Negative for pain.        (+) bilateral eye swelling   Respiratory:  Positive for cough and wheezing. Negative for shortness of breath.    Cardiovascular:  Negative for chest pain.   Gastrointestinal:  Negative for abdominal pain, diarrhea, nausea and vomiting.   Genitourinary:  Negative for dysuria.   Musculoskeletal:  Negative for back pain.        (-) Arm or leg trouble.    Skin:  Negative for rash.   Neurological:  Negative for headaches.   Psychiatric/Behavioral:  Negative for confusion.        Physical Exam     Initial Vitals [11/09/23 1312]   BP Pulse Resp Temp SpO2   (!) 160/77 82 20 99.1 °F (37.3 °C) 95 %      MAP       --         Physical Exam  The patient was examined specifically for the following:   General:No significant distress, Good color, Warm and dry. Head and neck:Scalp atraumatic, Neck supple. Neurological:Appropriate conversation, Gross motor deficits. Eyes:Conjugate gaze, Clear corneas. ENT: No epistaxis. Cardiac: Regular rate and  rhythm, Grossly normal heart tones. Pulmonary: Wheezing, Rales. Gastrointestinal: Abdominal tenderness, Abdominal distention. Musculoskeletal: Extremity deformity, Apparent pain with range of motion of the joints. Skin: Rash.   The findings on examination were normal except for the following:  The pharynx is not red.  There is mild scattered wheezing.  The patient has no fever.  There is no respiratory distress the abdomen is soft.  The patient has obvious facial congestion.  ED Course   Procedures  Labs Reviewed   POCT CMP - Abnormal; Notable for the following components:       Result Value    AST (SGOT), POC 46 (*)     All other components within normal limits   POCT RAPID INFLUENZA A/B - Abnormal; Notable for the following components:    Inflenza A Ag positive (*)     All other components within normal limits   TROPONIN ISTAT   POCT CBC   SARS-COV-2 RDRP GENE   POCT RAPID STREP A   POCT INFLUENZA A/B MOLECULAR   POCT CMP   POCT B-TYPE NATRIURETIC PEPTIDE (BNP)   POCT TROPONIN   POCT STREP A, RAPID   POCT B-TYPE NATRIURETIC PEPTIDE (BNP)          Imaging Results              X-Ray Chest AP Portable (Final result)  Result time 11/09/23 14:59:30      Final result by Zheng Holguin MD (11/09/23 14:59:30)                   Impression:      No acute process.      Electronically signed by: Zheng Holguin MD  Date:    11/09/2023  Time:    14:59               Narrative:    EXAMINATION:  XR CHEST AP PORTABLE    CLINICAL HISTORY:  chest pain;    TECHNIQUE:  Single frontal view of the chest was performed.    COMPARISON:  09/20/2021.    FINDINGS:  The trachea is unremarkable.  There are calcifications of the aortic knob.  The cardiomediastinal silhouette is within normal limits.  The hemidiaphragms are unremarkable.  There is no evidence of free air beneath the hemidiaphragms.  There are no pleural effusions.  There is no evidence of a pneumothorax.  There is no evidence of pneumomediastinum.  No airspace opacity is present.   The osseous structures are unremarkable.                                       Medications   albuterol-ipratropium 2.5 mg-0.5 mg/3 mL nebulizer solution 3 mL (3 mLs Nebulization Given 11/9/23 1450)   albuterol sulfate nebulizer solution 5 mg (5 mg Nebulization Given 11/9/23 1510)     Medical Decision Making  Amount and/or Complexity of Data Reviewed  Labs: ordered.  Radiology: ordered.    Risk  OTC drugs.  Prescription drug management.    Given the above, this patient is COVID and strep negative.  BNP is normal.  Troponin is negative chest x-ray fails to reveal pneumonia.  The patient was treated with multiple bronchodilator treatments.  This caused improvement in her symptoms.  Oxygen saturations are 94% at discharge.  The patient sees a pulmonologist for chronic lung problems related to her COVID infection in 2020.  I doubt congestive heart failure myocardial infarction bacterial pneumonia.  I believe the patient has some mild bronchospastic disease.  I will treat with albuterol Phenergan DM.  I offered Tamiflu.  The patient declined.  I will discharge to outpatient evaluation and treatment.        Scribe Attestation:   Scribe #1: I performed the above scribed service and the documentation accurately describes the services I performed. I attest to the accuracy of the note.                      Please note that the documentation on this chart was provided by the scribe above on the date of service noted above, and that the documentation in the chart accurately reflects the work and decisions made by me alone.  Signed, Dr. Thomas  Clinical Impression:   Final diagnoses:  [J10.1] Influenza A (Primary)  [J06.9] Viral URI with cough  [J98.01] Bronchospasm        ED Disposition Condition    Discharge Stable          ED Prescriptions       Medication Sig Dispense Start Date End Date Auth. Provider    albuterol (PROVENTIL/VENTOLIN HFA) 90 mcg/actuation inhaler Inhale 2 puffs into the lungs every 4 (four) hours as needed  for Wheezing. Rescue 8 g 11/9/2023 -- Marko Thomas MD    promethazine-dextromethorphan (PROMETHAZINE-DM) 6.25-15 mg/5 mL Syrp Take 5 mLs by mouth every 6 (six) hours as needed (cough). 180 mL 11/9/2023 11/19/2023 Marko Thomas MD    acetaminophen (TYLENOL) 500 MG tablet Take 2 tablets (1,000 mg total) by mouth every 8 (eight) hours as needed. 40 tablet 11/9/2023 -- Marko Thomas MD          Follow-up Information       Follow up With Specialties Details Why Contact Info    Wyatt Ely MD Internal Medicine, Wound Care In 3 days  [address] [facility] [address]  [phone]               Marko Thomas MD  11/09/23 1713       Marko Thomas MD  11/09/23 [id number]

## 2023-11-09 NOTE — DISCHARGE INSTRUCTIONS
Please follow-up with your primary care doctor in 3 days.  You may also follow up with the primary care doctor above.  Albuterol as directed.  Phenergan DM for cough.  Tylenol for body aches.  Return immediately if you get worse or if new problems develop.

## 2023-11-11 ENCOUNTER — NURSE TRIAGE (OUTPATIENT)
Dept: ADMINISTRATIVE | Facility: CLINIC | Age: 78
End: 2023-11-11
Payer: MEDICARE

## 2023-11-11 NOTE — TELEPHONE ENCOUNTER
Patient recently dx with the flu. She c/o green sputum, wheezing, and worsening of SOB. Patient was seen for these symptoms 2 days ago. Reports that symptoms are improving. Patient calling to schedule a f/u appointment. No appointments are available. Will route a message to the provider. Advised the patient to call back with any further questions or if symptoms worsen.        Reason for Disposition   [1] Influenza diagnosed by doctor (or NP/PA) AND [2] no complications    Additional Information   Negative: SEVERE difficulty breathing (e.g., struggling for each breath, speaks in single words)   Negative: Bluish (or gray) lips or face now   Negative: Shock suspected (e.g., cold/pale/clammy skin, too weak to stand, low BP, rapid pulse)   Negative: Sounds like a life-threatening emergency to the triager   Negative: Chest pain  (Exception: MILD central chest pain, present only when coughing.)   Negative: Headache and stiff neck (can't touch chin to chest)   Negative: [1] Difficulty breathing AND [2] not severe AND [3] not from stuffy nose (e.g., not relieved by cleaning out the nose)   Negative: Fever > 104 F (40 C)   Negative: Patient sounds very sick or weak to the triager   Negative: Fever present > 3 days (72 hours)   Negative: [1] Fever returns after gone for over 24 hours AND [2] symptoms worse or not improved   Negative: [1] Using nasal washes and pain medicine > 24 hours AND [2] sinus pain (around cheekbone or eye) persists   Negative: Earache   Negative: [1] HIGH RISK (e.g., age > 64 years, pregnant, HIV+, or chronic medical condition) AND [2]  > 72 hours (3 days) since evaluated by doctor (or NP/PA) AND [3] symptoms not improved   Negative: [1] Taking antiviral medication AND [2] has question about the medication that triager can't answer   Negative: [1] Nasal discharge AND [2] present > 10 days   Negative: Cough present > 3 weeks    Protocols used: Influenza (Flu) Follow-up Call-A-

## 2023-11-13 ENCOUNTER — TELEPHONE (OUTPATIENT)
Dept: FAMILY MEDICINE | Facility: CLINIC | Age: 78
End: 2023-11-13
Payer: MEDICARE

## 2023-11-13 NOTE — TELEPHONE ENCOUNTER
----- Message from Yenni Faith sent at 11/13/2023  3:22 PM CST -----  Regarding: appt request  Contact: [phone]  Pt Iram is calling. Pt would like to schedule their hospital follow up with Dr. Ely. She was reffered to him from an [facility] dr. Please give her a call back soon to schedule this appt.

## 2023-12-07 ENCOUNTER — HOSPITAL ENCOUNTER (OUTPATIENT)
Dept: CARDIOLOGY | Facility: HOSPITAL | Age: 78
Discharge: HOME | End: 2023-12-07
Attending: NURSE PRACTITIONER
Payer: MEDICARE

## 2023-12-07 DIAGNOSIS — R07.9 CHEST PAIN, UNSPECIFIED TYPE: ICD-10-CM

## 2023-12-07 LAB
ASCENDING AORTA: 3.15 CM
AV INDEX (PROSTH): 0.54
AV MEAN GRADIENT: 16 MMHG
AV PEAK GRADIENT: 26 MMHG
AV VALVE AREA BY VELOCITY RATIO: 1.58 CM²
AV VALVE AREA: 1.77 CM²
AV VELOCITY RATIO: 0.48
CV ECHO LV RWT: 0.63 CM
DOP CALC AO PEAK VEL: 2.57 M/S
DOP CALC AO VTI: 56.2 CM
DOP CALC LVOT AREA: 3.3 CM2
DOP CALC LVOT DIAMETER: 2.05 CM
DOP CALC LVOT PEAK VEL: 1.23 M/S
DOP CALC LVOT STROKE VOLUME: 99.63 CM3
DOP CALC MV VTI: 36.5 CM
DOP CALCLVOT PEAK VEL VTI: 30.2 CM
E WAVE DECELERATION TIME: 247.37 MSEC
E/A RATIO: 1
E/E' RATIO: 12.4 M/S
ECHO LV POSTERIOR WALL: 1.26 CM (ref 0.6–1.1)
FRACTIONAL SHORTENING: 37 % (ref 28–44)
INTERVENTRICULAR SEPTUM: 1.31 CM (ref 0.6–1.1)
IVC DIAMETER: 1.73 CM
IVRT: 70.41 MSEC
LA MAJOR: 4.78 CM
LA MINOR: 4.88 CM
LA WIDTH: 3.4 CM
LEFT ATRIUM SIZE: 4 CM
LEFT ATRIUM VOLUME: 55.83 CM3
LEFT INTERNAL DIMENSION IN SYSTOLE: 2.53 CM (ref 2.1–4)
LEFT VENTRICLE DIASTOLIC VOLUME: 69.98 ML
LEFT VENTRICLE SYSTOLIC VOLUME: 23.05 ML
LEFT VENTRICULAR INTERNAL DIMENSION IN DIASTOLE: 4 CM (ref 3.5–6)
LEFT VENTRICULAR MASS: 183.3 G
LV LATERAL E/E' RATIO: 12.4 M/S
LV SEPTAL E/E' RATIO: 12.4 M/S
LVOT MG: 3.17 MMHG
LVOT MV: 0.84 CM/S
MV MEAN GRADIENT: 4 MMHG
MV PEAK A VEL: 1.24 M/S
MV PEAK E VEL: 1.24 M/S
MV PEAK GRADIENT: 6 MMHG
MV STENOSIS PRESSURE HALF TIME: 71.74 MS
MV VALVE AREA BY CONTINUITY EQUATION: 2.73 CM2
MV VALVE AREA P 1/2 METHOD: 3.07 CM2
PISA TR MAX VEL: 2.51 M/S
PV PEAK GRADIENT: 5 MMHG
PV PEAK VELOCITY: 1.07 M/S
RA MAJOR: 4.56 CM
RA PRESSURE ESTIMATED: 8 MMHG
RA WIDTH: 3.7 CM
RIGHT VENTRICULAR END-DIASTOLIC DIMENSION: 3.2 CM
RV TB RVSP: 11 MMHG
RV TISSUE DOPPLER FREE WALL SYSTOLIC VELOCITY 1 (APICAL 4 CHAMBER VIEW): 12.15 CM/S
SINUS: 2.73 CM
STJ: 2.11 CM
TDI LATERAL: 0.1 M/S
TDI SEPTAL: 0.1 M/S
TDI: 0.1 M/S
TR MAX PG: 25 MMHG
TRICUSPID ANNULAR PLANE SYSTOLIC EXCURSION: 2.05 CM
TV REST PULMONARY ARTERY PRESSURE: 33 MMHG

## 2023-12-07 PROCEDURE — 93306 TTE W/DOPPLER COMPLETE: CPT | Mod: 26,,, | Performed by: INTERNAL MEDICINE

## 2023-12-07 PROCEDURE — 93306 TTE W/DOPPLER COMPLETE: CPT

## 2023-12-07 PROCEDURE — 93306 ECHO (CUPID ONLY): ICD-10-PCS | Mod: 26,,, | Performed by: INTERNAL MEDICINE

## 2023-12-14 ENCOUNTER — OFFICE VISIT (OUTPATIENT)
Dept: CARDIOLOGY | Facility: CLINIC | Age: 78
End: 2023-12-14
Payer: MEDICARE

## 2023-12-14 VITALS
OXYGEN SATURATION: 98 % | HEART RATE: 73 BPM | WEIGHT: 185 LBS | RESPIRATION RATE: 16 BRPM | DIASTOLIC BLOOD PRESSURE: 82 MMHG | SYSTOLIC BLOOD PRESSURE: 130 MMHG | BODY MASS INDEX: 36.32 KG/M2 | HEIGHT: 60 IN

## 2023-12-14 DIAGNOSIS — R09.89 BILATERAL CAROTID BRUITS: ICD-10-CM

## 2023-12-14 DIAGNOSIS — E07.9 THYROID DYSFUNCTION: ICD-10-CM

## 2023-12-14 DIAGNOSIS — E78.00 HYPERCHOLESTEREMIA: ICD-10-CM

## 2023-12-14 DIAGNOSIS — Z78.9 STATIN INTOLERANCE: ICD-10-CM

## 2023-12-14 DIAGNOSIS — I35.0 NONRHEUMATIC AORTIC VALVE STENOSIS: Primary | ICD-10-CM

## 2023-12-14 DIAGNOSIS — I10 ESSENTIAL HYPERTENSION: ICD-10-CM

## 2023-12-14 PROCEDURE — 99214 OFFICE O/P EST MOD 30 MIN: CPT | Mod: S$PBB,,, | Performed by: NURSE PRACTITIONER

## 2023-12-14 PROCEDURE — 99999 PR PBB SHADOW E&M-EST. PATIENT-LVL V: ICD-10-PCS | Mod: PBBFAC,,, | Performed by: NURSE PRACTITIONER

## 2023-12-14 PROCEDURE — 99215 OFFICE O/P EST HI 40 MIN: CPT | Mod: PBBFAC,PN | Performed by: NURSE PRACTITIONER

## 2023-12-14 PROCEDURE — 99999 PR PBB SHADOW E&M-EST. PATIENT-LVL V: CPT | Mod: PBBFAC,,, | Performed by: NURSE PRACTITIONER

## 2023-12-14 PROCEDURE — 99214 PR OFFICE/OUTPT VISIT, EST, LEVL IV, 30-39 MIN: ICD-10-PCS | Mod: S$PBB,,, | Performed by: NURSE PRACTITIONER

## 2023-12-14 NOTE — PROGRESS NOTES
Subjective:    Patient ID:  Iram Aguirre is a 78 y.o. female patient here for evaluation Results (Echo results and labs in epic)      History of Present Illness:       Recent ECHO       Left Ventricle: The left ventricle is normal in size. Mildly increased wall thickness. There is mild concentric hypertrophy. Normal wall motion. There is normal systolic function with a visually estimated ejection fraction of 65 - 70%. Grade I diastolic dysfunction.    Right Ventricle: Normal right ventricular cavity size. Systolic function is normal.    Aortic Valve: The aortic valve is a trileaflet valve. There is moderate aortic valve sclerosis. There is mild stenosis. Aortic valve area by VTI is 1.77 cm². Aortic valve peak velocity is 2.57 m/s. Mean gradient is 16 mmHg. The dimensionless index is 0.54.    Pulmonary Artery: The estimated pulmonary artery systolic pressure is 33 mmHg.       Review of patient's allergies indicates:   Allergen Reactions    Cortisone Shortness Of Breath     Causes heart to race    Hair formula [mv,iron,min-folic acid-biotin]      dye    Levaquin [levofloxacin] Other (See Comments)     Tongue swelling and blisters    Codeine Nausea Only    Doxycycline      Other reaction(s): Unknown       Past Medical History:   Diagnosis Date    AR (allergic rhinitis) 11/29/2012    Benign hypertension     Depression     Diverticulosis 2005    Esophageal polyp     Fibromyalgia     Gastric ulcer; benign 2007    GERD (gastroesophageal reflux disease) 11/29/2012    Hypercholesteremia     Hypothyroidism     Kidney stones     MTHFR gene mutation     per daughter E72.8    Obstructive sleep apnea on CPAP     Osteopenia     Rectal polyp     Skin cancer     pre skin cancer skin    Vulvovaginal melanosis      Past Surgical History:   Procedure Laterality Date    CHOLECYSTECTOMY  08/08/2012    CHOLECYSTECTOMY      COLONOSCOPY N/A 2/6/2017    Procedure: COLONOSCOPY;  Surgeon: Arturo Crane MD;  Location: [facility];   Service: Endoscopy;  Laterality: N/A;    FOOT FRACTURE SURGERY  2005    plate in right foot    GANGLION CYST EXCISION  1980    left wrist    HYSTERECTOMY      KNEE ARTHROSCOPY W/ ACL RECONSTRUCTION      bilateral     NASAL SEPTUM SURGERY      TEAR DUCT SURGERY  2006    left eye    TOTAL KNEE ARTHROPLASTY      bilateral    TOTAL VAGINAL HYSTERECTOMY  age 31    w/BSO    TUBAL LIGATION  age 26     Social History     Tobacco Use    Smoking status: Never    Smokeless tobacco: Never   Substance Use Topics    Alcohol use: No    Drug use: No        Review of Systems:    As noted in HPI                 Objective        Vitals:    12/14/23 1351   BP: 130/82   Pulse: 73   Resp: 16       LIPIDS - LAST 2   Lab Results   Component Value Date    CHOL 262 (H) 09/19/2022    CHOL 224 (H) 11/08/2019    HDL 74 09/19/2022    HDL 56 11/08/2019    LDLCALC 174.4 (H) 09/19/2022    LDLCALC 145.0 11/08/2019    TRIG 68 09/19/2022    TRIG 115 11/08/2019    CHOLHDL 28.2 09/19/2022    CHOLHDL 25.0 11/08/2019       CBC - LAST 2  Lab Results   Component Value Date    WBC 8.96 05/08/2023    WBC 6.07 09/15/2022    RBC 4.07 05/08/2023    RBC 3.86 (L) 09/15/2022    HGB 13.0 05/08/2023    HGB 12.4 09/15/2022    HCT 39.6 05/08/2023    HCT 37.8 09/15/2022    MCV 97 05/08/2023    MCV 98 09/15/2022    MCH 31.9 (H) 05/08/2023    MCH 32.1 (H) 09/15/2022    MCHC 32.8 05/08/2023    MCHC 32.8 09/15/2022    RDW 13.3 05/08/2023    RDW 13.5 09/15/2022     05/08/2023     09/15/2022    MPV 9.7 05/08/2023    MPV 9.8 09/15/2022    GRAN 5.2 05/08/2023    GRAN 58.5 05/08/2023    LYMPH 2.8 05/08/2023    LYMPH 30.8 05/08/2023    MONO 0.7 05/08/2023    MONO 7.5 05/08/2023    BASO 0.06 05/08/2023    BASO 0.05 03/24/2021    NRBC 0 05/08/2023    NRBC 0 03/24/2021       CHEMISTRY & LIVER FUNCTION - LAST 2  Lab Results   Component Value Date     05/08/2023     09/15/2022    K 3.7 05/08/2023    K 3.6 09/15/2022     05/08/2023     09/15/2022     CO2 28 05/08/2023    CO2 30 (H) 09/15/2022    ANIONGAP 10 05/08/2023    ANIONGAP 7 (L) 09/15/2022    BUN 11 05/08/2023    BUN 17 09/15/2022    CREATININE 1.0 05/08/2023    CREATININE 0.9 09/15/2022     05/08/2023     09/15/2022    CALCIUM 10.2 05/08/2023    CALCIUM 9.9 09/15/2022    MG 1.8 11/08/2019    ALBUMIN 4.3 05/08/2023    ALBUMIN 3.9 09/15/2022    PROT 7.8 05/08/2023    PROT 7.4 09/15/2022    ALKPHOS 68 05/08/2023    ALKPHOS 67 09/15/2022    ALT 22 05/08/2023    ALT 21 09/15/2022    AST 28 05/08/2023    AST 26 09/15/2022    BILITOT 0.9 05/08/2023    BILITOT 0.8 09/15/2022        CARDIAC PROFILE - LAST 2  Lab Results   Component Value Date    BNP 10 09/15/2022    BNP 34 03/24/2021    CPK 68 03/24/2021     03/24/2021    TROPONINI <0.030 03/24/2021    TROPONINI <0.030 11/08/2019        COAGULATION - LAST 2  Lab Results   Component Value Date    LABPT 13.8 11/07/2019    INR 1.2 09/20/2021    INR 1.1 11/07/2019       ENDOCRINE & PSA - LAST 2  Lab Results   Component Value Date    HGBA1C 5.9 11/15/2021    HGBA1C 5.9 11/08/2019    TSH 2.028 10/09/2023    TSH 1.620 09/15/2022    PROCAL <0.05 03/24/2021            CURRENT/PREVIOUS VISIT EKG  Results for orders placed or performed during the hospital encounter of 09/20/21   EKG 12-lead    Collection Time: 09/20/21 11:18 AM    Narrative    Test Reason : R07.9,    Vent. Rate : 069 BPM     Atrial Rate : 069 BPM     P-R Int : 160 ms          QRS Dur : 126 ms      QT Int : 430 ms       P-R-T Axes : 067 065 042 degrees     QTc Int : 460 ms    Normal sinus rhythm  Right bundle branch block  Abnormal ECG  When compared with ECG of 24-MAR-2021 18:13,  No significant change was found  Confirmed by Sandip CRUZ, Koko CAVAZOS ([id number]) on 9/22/2021 10:50:37 PM    Referred By: JENNA   SELF           Confirmed By:Koko Oropeza MD     No valid procedures specified.   Results for orders placed during the hospital encounter of 10/09/23    Nuclear Stress - Cardiology  Interpreted    Interpretation Summary    Normal myocardial perfusion scan. There is no evidence of myocardial ischemia or infarction.    There is a trivial to mild intensity perfusion abnormality in the anteroapical wall of the left ventricle, secondary to breast attenuation.    The gated perfusion images showed an ejection fraction of 90% at rest. The gated perfusion images showed an ejection fraction of 73% post stress. Normal ejection fraction is greater than 53%.    There is normal wall motion at rest and post stress.    LV cavity size is normal at rest and normal at stress.    The ECG portion of the study is negative for ischemia.    The patient reported no chest pain during the stress test.    There were no arrhythmias during stress.        PHYSICAL EXAM  CONSTITUTIONAL: Well built, well nourished in no apparent distress  NECK: no carotid bruit, no JVD  LUNGS: CTA  CHEST WALL: no tenderness  HEART: regular rate and rhythm, S1, S2 normal, +murmur  ABDOMEN: soft, non-tender; bowel sounds normal; no masses,  no organomegaly  EXTREMITIES: Extremities normal, no edema, no calf tenderness noted  NEURO: AAO X 3    I HAVE REVIEWED :    The vital signs, nurses notes, and all the pertinent radiology and labs.        Current Outpatient Medications   Medication Instructions    acetaminophen (TYLENOL) 1,000 mg, Oral, Every 8 hours PRN    albuterol (PROVENTIL/VENTOLIN HFA) 90 mcg/actuation inhaler 2 puffs, Inhalation, Every 4 hours PRN, Rescue     ALBUTEROL INHL 1 puff, Inhalation, As needed (PRN)    azelastine (ASTELIN) 137 mcg (0.1 %) nasal spray 137 sprays, Nasal, Daily    cholecalciferol, vitamin D3, 125 mcg (5,000 unit) Tab 1 tablet, Oral, Nightly    coenzyme Q10 100 mg, Sublingual, Daily    EPINEPHrine (EPIPEN) 0.3 mg/0.3 mL AtIn 0.3 mLs, Intramuscular, As needed (PRN)    FLUoxetine 20 mg, Oral, Daily    fluticasone (FLONASE) 50 mcg/actuation nasal spray 1 spray, Each Nostril, Daily PRN    HYDROcodone-acetaminophen  (NORCO) 5-325 mg per tablet 5 tablets, Oral, As needed (PRN)    ipratropium (ATROVENT) 0.03 % nasal spray 2 sprays, Nasal, Daily PRN    isosorbide mononitrate (IMDUR) 60 mg, Oral, Daily    KRILL OIL ORAL Oral    Lactobacillus rhamnosus GG (CULTURELLE) 10 billion cell capsule 1 capsule, Oral, Daily    levomefolate calcium (ELFOLATE) 15 mg, Oral, Daily    LIDOcaine (LIDODERM) 5 % 1 patch, Transdermal, Daily, Remove & Discard patch within 12 hours or as directed by MD    loratadine (CLARITIN) 10 mg, Oral, Daily    losartan-hydrochlorothiazide 100-25 mg (HYZAAR) 100-25 mg per tablet TAKE 1 TABLET BY MOUTH EVERY DAY    MAGNESIUM L-THREONATE ORAL 144 mg, Oral    magnesium 30 mg, Oral, Daily    mecobalamin (vitamin B12) 1,000 mcg, Oral, Daily    metroNIDAZOLE (FLAGYL) 500 mg, Oral, As needed (PRN)    multivitamin (THERAGRAN) per tablet 1 tablet, Oral, Daily    mupirocin (BACTROBAN) 2 % ointment 2 Tubes, Topical (Top), As needed (PRN)    mv-mn/folic acid/vit [id number] (ALIVE ONCE DAILY WOMEN 50 PLUS ORAL) 1 tablet, Oral, Daily    naproxen (NAPROSYN) 500 MG tablet 1 tablet, Oral, As needed (PRN)    om 3/E/linol/ala/oleic/gla/lip (OMEGA 3-6-9 ORAL) Oral    oxyCODONE (ROXICODONE) 5 mg, Oral, Every 6 hours PRN    potassium chloride SA (K-DUR,KLOR-CON M) 10 MEQ tablet 10 capsules, Oral, Daily    terazosin (HYTRIN) 5 mg, Oral, Nightly    terazosin (HYTRIN) 2 mg, Oral, Nightly    TIROSINT 88 mcg, Oral, Daily    turmeric, bulk, 100 % Powd 1 capsule, Oral, Daily    vitamin E 400 UNIT capsule 1 capsule, Oral, Daily          Assessment & Plan     Problem List Items Addressed This Visit          Cardiac/Vascular    Hypercholesteremia    Essential hypertension    Nonrheumatic aortic valve stenosis - Primary    Bilateral carotid bruits       Endocrine    Thyroid dysfunction       Other    Statin intolerance        Continuing current management.   Stable on all of the above.

## 2023-12-20 RX ORDER — POTASSIUM CHLORIDE 750 MG/1
CAPSULE, EXTENDED RELEASE ORAL
Qty: 180 CAPSULE | Refills: 3 | Status: SHIPPED | OUTPATIENT
Start: 2023-12-20

## 2024-01-10 ENCOUNTER — LAB VISIT (OUTPATIENT)
Dept: LAB | Facility: HOSPITAL | Age: 79
End: 2024-01-10
Attending: OPHTHALMOLOGY
Payer: MEDICARE

## 2024-01-10 DIAGNOSIS — H02.831 DERMATOCHALASIS OF RIGHT UPPER EYELID: Primary | ICD-10-CM

## 2024-01-10 DIAGNOSIS — H02.834 DERMATOCHALASIS OF LEFT UPPER EYELID: ICD-10-CM

## 2024-01-10 LAB
ANION GAP SERPL CALC-SCNC: 8 MMOL/L (ref 8–16)
BUN SERPL-MCNC: 19 MG/DL (ref 8–23)
CALCIUM SERPL-MCNC: 10 MG/DL (ref 8.7–10.5)
CHLORIDE SERPL-SCNC: 102 MMOL/L (ref 95–110)
CO2 SERPL-SCNC: 28 MMOL/L (ref 23–29)
CREAT SERPL-MCNC: 1 MG/DL (ref 0.5–1.4)
ERYTHROCYTE [DISTWIDTH] IN BLOOD BY AUTOMATED COUNT: 13.2 % (ref 11.5–14.5)
EST. GFR  (NO RACE VARIABLE): 58 ML/MIN/1.73 M^2
GLUCOSE SERPL-MCNC: 111 MG/DL (ref 70–110)
HCT VFR BLD AUTO: 37.2 % (ref 37–48.5)
HGB BLD-MCNC: 12 G/DL (ref 12–16)
MCH RBC QN AUTO: 31 PG (ref 27–31)
MCHC RBC AUTO-ENTMCNC: 32.3 G/DL (ref 32–36)
MCV RBC AUTO: 96 FL (ref 82–98)
PLATELET # BLD AUTO: 230 K/UL (ref 150–450)
PMV BLD AUTO: 9.3 FL (ref 9.2–12.9)
POTASSIUM SERPL-SCNC: 3.8 MMOL/L (ref 3.5–5.1)
RBC # BLD AUTO: 3.87 M/UL (ref 4–5.4)
SODIUM SERPL-SCNC: 138 MMOL/L (ref 136–145)
WBC # BLD AUTO: 6.69 K/UL (ref 3.9–12.7)

## 2024-01-10 PROCEDURE — 36415 COLL VENOUS BLD VENIPUNCTURE: CPT | Performed by: OPHTHALMOLOGY

## 2024-01-10 PROCEDURE — 85027 COMPLETE CBC AUTOMATED: CPT | Performed by: OPHTHALMOLOGY

## 2024-01-10 PROCEDURE — 80048 BASIC METABOLIC PNL TOTAL CA: CPT | Performed by: OPHTHALMOLOGY

## 2024-01-11 DIAGNOSIS — M25.551 BILATERAL HIP PAIN: Primary | ICD-10-CM

## 2024-01-11 DIAGNOSIS — M25.552 BILATERAL HIP PAIN: Primary | ICD-10-CM

## 2024-01-19 ENCOUNTER — OFFICE VISIT (OUTPATIENT)
Dept: ORTHOPEDICS | Facility: CLINIC | Age: 79
End: 2024-01-19
Attending: ORTHOPAEDIC SURGERY
Payer: MEDICARE

## 2024-01-19 VITALS — BODY MASS INDEX: 36.32 KG/M2 | HEIGHT: 60 IN | WEIGHT: 185 LBS

## 2024-01-19 DIAGNOSIS — M54.16 LUMBAR RADICULOPATHY: ICD-10-CM

## 2024-01-19 DIAGNOSIS — M25.552 BILATERAL HIP PAIN: Primary | ICD-10-CM

## 2024-01-19 DIAGNOSIS — M25.551 BILATERAL HIP PAIN: Primary | ICD-10-CM

## 2024-01-19 DIAGNOSIS — M51.36 DDD (DEGENERATIVE DISC DISEASE), LUMBAR: Primary | ICD-10-CM

## 2024-01-19 PROCEDURE — 99214 OFFICE O/P EST MOD 30 MIN: CPT | Mod: PBBFAC,PN | Performed by: ORTHOPAEDIC SURGERY

## 2024-01-19 PROCEDURE — 99999 PR PBB SHADOW E&M-EST. PATIENT-LVL IV: CPT | Mod: PBBFAC,,, | Performed by: ORTHOPAEDIC SURGERY

## 2024-01-19 PROCEDURE — 99204 OFFICE O/P NEW MOD 45 MIN: CPT | Mod: S$PBB,,, | Performed by: ORTHOPAEDIC SURGERY

## 2024-01-19 NOTE — PROGRESS NOTES
NEW PATIENT ORTHOPAEDIC: HIP    PRIMARY CARE PHYSICIAN: Marina, Primary Doctor   REFERRING PROVIDER: Kapil Granados MD  [address] [facility]  [place] [id number]     ASSESSMENT & PLAN:    Impression:  Lumbar Radiculopathy  DDD Lumbar Spine    Follow Up Plan: PRN    Non operative care:    Iram Aguirre has physical exam evidence of above and wishes to pursue an non-operative care. I am recommending the following: referral to pain management     Patient comes in with a long history of having bilateral lower back and hip pain but worse on the left.  She reports a posterior and lateral based hip pain that can extend when she is lying down at night to her knee but not past it.  She also has some lateral compartment arthritis on the left knee that is being treated with viscosupplementation injections.  She does not have radiographic evidence of significant arthritis of her hips.  She does have severe degenerative disc disease of her lumbar spine.  It appeared to me that this would be more consistent with that or radicular-type pain.  As she does not have any pain with internal or external rotation of her hip.  For further consideration of injections or therapy we will refer her to pain management.  Happy to see her in the future should her viscosupplementation injections wear off and knee to consider surgical intervention for her left knee.    The patient has been ordered:  None    CONSULTS:   Pain Management     ACTIVE PROBLEM LIST  Patient Active Problem List   Diagnosis    Gallstones    Hypothyroidism    Hypercholesteremia    Osteopenia    Obstructive sleep apnea on CPAP    Vitamin D deficiency disease    Fibromyalgia    Rectal polyp    AR (allergic rhinitis)    GERD (gastroesophageal reflux disease)    Vulvovaginal melanosis    Hx of colonic polyps    Chest pain    Anxiety    Essential hypertension    Statin intolerance    Respiratory tract infection due to COVID-19 virus    Atypical chest pain    Cervical neuropathy     Thyroid dysfunction    Chronic low back pain with sciatica    Dyspnea on exertion    Hyperglycemia    Abnormal electrocardiogram (ECG) (EKG)    Nonrheumatic aortic valve stenosis    Bilateral carotid bruits    Vitreous degeneration of both eyes    Pseudophakia of both eyes    Dry eye syndrome of both eyes           SUBJECTIVE    CHIEF COMPLAINT: Hip Pain    HPI:   Iram Aguirre is a 78 y.o. female here for evaluation and management of left hip pain. There is not a specific incident that brought about this pain. she has had progressive problems with the hip(s) starting 1 years ago and is progressing which is now interfering with activities which include: walking and functional household ADL's    Currently the pain in the joint is moderate with activity.  The pain is intermittent and is located in buttocks and lateral hip.  The pain is described as aching, radiating, and shooting . Relieving factors include rest, over the counter medication , and repositioning. No associated Catching, Clicking, and Popping.     They do not report leg length inequality.     Iram Aguirre has no additional complaints.     PROGRESSIVE SYMPTOMS:  Pain impacting sleep  Pain worsened by weight bearing  Pain effecting living situation    FUNCTIONAL STATUS:   Climb a flight of stairs or walk up a hill     PREVIOUS TREATMENTS:  Medical: OTC NSAIDS and Tylenol  Physical Therapy: Activities Modified   Previous Orthopaedic Surgery: None    REVIEW OF SYSTEMS:  PAIN ASSESSMENT:  See HPI.  MUSCULOSKELETAL: See HPI.  OTHER 10 point review of systems is negative except as stated in HPI above    PAST MEDICAL HISTORY   has a past medical history of AR (allergic rhinitis) (11/29/2012), Benign hypertension, Depression, Diverticulosis (2005), Esophageal polyp, Fibromyalgia, Gastric ulcer; benign (2007), GERD (gastroesophageal reflux disease) (11/29/2012), Hypercholesteremia, Hypothyroidism, Kidney stones, MTHFR gene mutation, Obstructive sleep  apnea on CPAP, Osteopenia, Rectal polyp, Skin cancer, and Vulvovaginal melanosis.     PAST SURGICAL HISTORY   has a past surgical history that includes Tear duct surgery (2006); Nasal septum surgery; Tubal ligation (age 26); Ganglion cyst excision (1980); Foot fracture surgery (2005); Knee arthroscopy w/ ACL reconstruction; Cholecystectomy (08/08/2012); Total vaginal hysterectomy (age 31); Total knee arthroplasty; Hysterectomy; Cholecystectomy; and Colonoscopy (N/A, 2/6/2017).     FAMILY HISTORY  family history includes Heart disease in her father; Kidney disease in her mother.     SOCIAL HISTORY   reports that she has never smoked. She has never used smokeless tobacco. She reports that she does not drink alcohol and does not use drugs.     ALLERGIES   Review of patient's allergies indicates:   Allergen Reactions    Cortisone Shortness Of Breath     Causes heart to race    Hair formula [mv,iron,min-folic acid-biotin]      dye    Levaquin [levofloxacin] Other (See Comments)     Tongue swelling and blisters    Codeine Nausea Only    Doxycycline      Other reaction(s): Unknown        MEDICATIONS   Current Outpatient Medications on File Prior to Visit   Medication Sig Dispense Refill    acetaminophen (TYLENOL) 500 MG tablet Take 2 tablets (1,000 mg total) by mouth every 8 (eight) hours as needed. 40 tablet 0    albuterol (PROVENTIL/VENTOLIN HFA) 90 mcg/actuation inhaler Inhale 2 puffs into the lungs every 4 (four) hours as needed for Wheezing. Rescue 8 g 1    ALBUTEROL INHL Inhale 1 puff into the lungs as needed.      azelastine (ASTELIN) 137 mcg (0.1 %) nasal spray 137 sprays by Nasal route once daily.      cholecalciferol, vitamin D3, 125 mcg (5,000 unit) Tab Take 1 tablet by mouth every evening.      coenzyme Q10 100 mg capsule Place 100 mg under the tongue once daily.      EPINEPHrine (EPIPEN) 0.3 mg/0.3 mL AtIn Inject 0.3 mLs into the muscle as needed.      FLUoxetine 20 MG capsule Take 20 mg by mouth once daily.    2    fluticasone (FLONASE) 50 mcg/actuation nasal spray 1 spray by Each Nostril route daily as needed.       HYDROcodone-acetaminophen (NORCO) 5-325 mg per tablet Take 5 tablets by mouth as needed.      ipratropium (ATROVENT) 0.03 % nasal spray 2 sprays by Nasal route daily as needed.       isosorbide mononitrate (IMDUR) 30 MG 24 hr tablet Take 2 tablets (60 mg total) by mouth once daily. 90 tablet 3    KRILL OIL ORAL Take by mouth.      Lactobacillus rhamnosus GG (CULTURELLE) 10 billion cell capsule Take 1 capsule by mouth once daily.      levomefolate calcium (ELFOLATE) 15 mg Tab Take 15 mg by mouth once daily.      LIDOcaine (LIDODERM) 5 % Place 1 patch onto the skin once daily. Remove & Discard patch within 12 hours or as directed by MD 5 patch 0    loratadine (CLARITIN) 10 mg tablet Take 10 mg by mouth once daily.      losartan-hydrochlorothiazide 100-25 mg (HYZAAR) 100-25 mg per tablet TAKE 1 TABLET BY MOUTH EVERY DAY 90 tablet 3    magnesium 30 mg tablet Take 30 mg by mouth once daily.      MAGNESIUM L-THREONATE ORAL Take 144 mg by mouth.      mecobalamin, vitamin B12, 1,000 mcg Chew Take 1,000 mcg by mouth once daily.      metroNIDAZOLE (FLAGYL) 500 MG tablet Take 500 mg by mouth as needed.      multivitamin (THERAGRAN) per tablet Take 1 tablet by mouth once daily.      mupirocin (BACTROBAN) 2 % ointment Apply 2 Tubes topically as needed.      mv-mn/folic acid/vit [id number] (ALIVE ONCE DAILY WOMEN 50 PLUS ORAL) Take 1 tablet by mouth once daily.      naproxen (NAPROSYN) 500 MG tablet Take 1 tablet by mouth as needed.      om 3/E/linol/ala/oleic/gla/lip (OMEGA 3-6-9 ORAL) Take by mouth.      oxyCODONE (ROXICODONE) 5 MG immediate release tablet Take 1 tablet (5 mg total) by mouth every 6 (six) hours as needed for Pain. 8 tablet 0    potassium chloride (MICRO-K) 10 MEQ CpSR TAKE 1 CAPSULE BY MOUTH TWICE A  capsule 3    potassium chloride SA (K-DUR,KLOR-CON M) 10 MEQ tablet Take 10 capsules by mouth once  daily.      terazosin (HYTRIN) 2 MG capsule Take 1 capsule (2 mg total) by mouth every evening. 90 capsule 3    terazosin (HYTRIN) 5 MG capsule Take 1 capsule (5 mg total) by mouth every evening. 90 capsule 3    TIROSINT 88 mcg Cap Take 88 mcg by mouth once daily.       turmeric, bulk, 100 % Powd Take 1 capsule by mouth once daily.      vitamin E 400 UNIT capsule Take 1 capsule by mouth once daily.       No current facility-administered medications on file prior to visit.          PHYSICAL EXAM   height is 5' (1.524 m) and weight is 83.9 kg (185 lb).   Body mass index is 36.13 kg/m².      All other systems deferred.  GENERAL:  No acute distress  HABITUS: Obese  GAIT: Non-antalgic  SKIN: Normal     HIP EXAM:    left:   ROM:   Flexion: 120 degrees    Internal Rotation: 30 degrees    External Rotation: 30 degrees    Abduction: 45 degrees    Adduction: 20 degrees  No apparent leg length discrepancy    Palpation: Yes tenderness over Greater trochanter   Pain is not reproduced with IR or ER of the hip  Strength: 5/5 hip flexion, abduction, knee flexion and extension   Straight leg raise: Negative   Neurovascular Status: Sensation intact to light touch in Sural, saphenous, SPN, DPN, Tibial nerve distribution  2+ pulse DP/PT, normal capillary refill, foot has normal warmth    DATA:  Diagnostic tests reviewed for today's visit:     AP pelvis, hip, and lateral radiographs shows minimal narrowing of the superior joint space with sclerosis. The femoral neck is in netural position. The femoral head is spherical at superior margin. There is no signficant evidence of acetabular dysplasia and the femoral head remains covered.  DDD of lumbar spine observed.

## 2024-01-23 ENCOUNTER — OFFICE VISIT (OUTPATIENT)
Dept: PAIN MEDICINE | Facility: CLINIC | Age: 79
End: 2024-01-23
Payer: MEDICARE

## 2024-01-23 ENCOUNTER — TELEPHONE (OUTPATIENT)
Dept: PAIN MEDICINE | Facility: CLINIC | Age: 79
End: 2024-01-23
Payer: MEDICARE

## 2024-01-23 VITALS — DIASTOLIC BLOOD PRESSURE: 77 MMHG | HEART RATE: 88 BPM | SYSTOLIC BLOOD PRESSURE: 159 MMHG | OXYGEN SATURATION: 95 %

## 2024-01-23 DIAGNOSIS — M51.36 DDD (DEGENERATIVE DISC DISEASE), LUMBAR: ICD-10-CM

## 2024-01-23 DIAGNOSIS — M47.816 LUMBAR SPONDYLOSIS: Primary | ICD-10-CM

## 2024-01-23 DIAGNOSIS — M50.30 DDD (DEGENERATIVE DISC DISEASE), CERVICAL: ICD-10-CM

## 2024-01-23 DIAGNOSIS — M47.812 CERVICAL SPONDYLOSIS: ICD-10-CM

## 2024-01-23 PROBLEM — M51.369 DDD (DEGENERATIVE DISC DISEASE), LUMBAR: Status: ACTIVE | Noted: 2024-01-23

## 2024-01-23 PROCEDURE — 99204 OFFICE O/P NEW MOD 45 MIN: CPT | Mod: S$PBB,,, | Performed by: PAIN MEDICINE

## 2024-01-23 PROCEDURE — 99999 PR PBB SHADOW E&M-EST. PATIENT-LVL III: CPT | Mod: PBBFAC,,, | Performed by: PAIN MEDICINE

## 2024-01-23 PROCEDURE — 99213 OFFICE O/P EST LOW 20 MIN: CPT | Mod: PBBFAC,PN | Performed by: PAIN MEDICINE

## 2024-01-23 NOTE — PROGRESS NOTES
Subjective:     Patient ID: Iram Aguirre is a 78 y.o. female    Chief Complaint: Back Pain      Referred by: Kapil Granados MD      HPI:    Initial Encounter (1/23/24):  Iram Aguirre is a 78 y.o. female who presents today with chronic spine pain.  She reports having pain throughout the cervical, thoracic and lumbar regions.  States the worst of her pain currently is in the cervical region.  This pain is constant and worsened with activity.  It is located in the cervical spine and cervical paraspinal regions.  Pain will extend across the upper back as well.  She denies any pain extending into the upper extremities.  She denies any associated numbness, tingling, weakness, bowel bladder dysfunction.  She has been treated by pain management in [place].  States that she would rather undergo treatment at [facility] this is closer to her house.   This pain is described in detail below.    Physical Therapy:  No.    Non-pharmacologic Treatment:  Rest helps         TENS?  No    Pain Medications:         Currently taking:  Tylenol, NSAIDs    Has tried in the past:  Lidoderm patches    Has not tried:  Opioids, Muscle relaxants, TCAs, SNRIs, anticonvulsants    Blood thinners:  None    Interventional Therapies:   Previous interventional procedures done in [place].  No records available at this time.    Relevant Surgeries:  None    Affecting sleep?  Yes    Affecting daily activities? yes    Depressive symptoms? No          SI/HI? No    Work status: Retired    Pain Scores:    Best:       2/10  Worst:     10/10  Usually:   5/10  Today:    5/10    Pain Disability Index  Family/Home Responsibilities:: 9  Recreation:: 9  Social Activity:: 9  Occupation:: 0  Sexual Behavior:: 0  Self Care:: 9  Life-Support Activities:: 9  Pain Disability Index (PDI): 45    Review of Systems   Constitutional:  Negative for activity change, appetite change, chills, fatigue, fever and unexpected weight change.   HENT:  Negative for  hearing loss.    Eyes:  Negative for visual disturbance.   Respiratory:  Negative for chest tightness and shortness of breath.    Cardiovascular:  Negative for chest pain.   Gastrointestinal:  Negative for abdominal pain, constipation, diarrhea, nausea and vomiting.   Genitourinary:  Negative for difficulty urinating.   Musculoskeletal:  Positive for arthralgias, back pain, gait problem, myalgias, neck pain and neck stiffness.   Skin:  Negative for rash.   Neurological:  Negative for dizziness, weakness, light-headedness, numbness and headaches.   Psychiatric/Behavioral:  Positive for sleep disturbance. Negative for hallucinations and suicidal ideas. The patient is not nervous/anxious.        Past Medical History:   Diagnosis Date    AR (allergic rhinitis) 11/29/2012    Benign hypertension     Depression     Diverticulosis 2005    Esophageal polyp     Fibromyalgia     Gastric ulcer; benign 2007    GERD (gastroesophageal reflux disease) 11/29/2012    Hypercholesteremia     Hypothyroidism     Kidney stones     MTHFR gene mutation     per daughter E72.8    Obstructive sleep apnea on CPAP     Osteopenia     Rectal polyp     Skin cancer     pre skin cancer skin    Vulvovaginal melanosis        Past Surgical History:   Procedure Laterality Date    CHOLECYSTECTOMY  08/08/2012    CHOLECYSTECTOMY      COLONOSCOPY N/A 2/6/2017    Procedure: COLONOSCOPY;  Surgeon: Arturo Crane MD;  Location: [facility];  Service: Endoscopy;  Laterality: N/A;    FOOT FRACTURE SURGERY  2005    plate in right foot    GANGLION CYST EXCISION  1980    left wrist    HYSTERECTOMY      KNEE ARTHROSCOPY W/ ACL RECONSTRUCTION      bilateral     NASAL SEPTUM SURGERY      TEAR DUCT SURGERY  2006    left eye    TOTAL KNEE ARTHROPLASTY      bilateral    TOTAL VAGINAL HYSTERECTOMY  age 31    w/BSO    TUBAL LIGATION  age 26       Social History     Socioeconomic History    Marital status:     Number of children: 3   Tobacco Use    Smoking status:  Never    Smokeless tobacco: Never   Substance and Sexual Activity    Alcohol use: No    Drug use: No    Sexual activity: Not Currently     Partners: Male     Birth control/protection: None     Social Determinants of Health     Financial Resource Strain: High Risk (12/4/2023)    Overall Financial Resource Strain (CARDIA)     Difficulty of Paying Living Expenses: Hard   Food Insecurity: Food Insecurity Present (12/4/2023)    Hunger Vital Sign     Worried About Running Out of Food in the Last Year: Sometimes true     Ran Out of Food in the Last Year: Patient declined   Transportation Needs: No Transportation Needs (12/4/2023)    PRAPARE - Transportation     Lack of Transportation (Medical): No     Lack of Transportation (Non-Medical): No   Physical Activity: Unknown (12/4/2023)    Exercise Vital Sign     Days of Exercise per Week: 0 days   Stress: No Stress Concern Present (12/4/2023)    [place] citizen [place] [employer] Occupational [employer] - Occupational Stress Questionnaire     Feeling of Stress : Not at all   Social Connections: Unknown (12/4/2023)    Social Connection and Isolation Panel [NHANES]     Frequency of Communication with Friends and Family: More than three times a week     Frequency of Social Gatherings with Friends and Family: More than three times a week     Active Member of Clubs or Organizations: No     Attends Club or Organization Meetings: Never     Marital Status:    Housing Stability: Low Risk  (12/4/2023)    Housing Stability Vital Sign     Unable to Pay for Housing in the Last Year: No     Number of Places Lived in the Last Year: 1     Unstable Housing in the Last Year: No       Review of patient's allergies indicates:   Allergen Reactions    Cortisone Shortness Of Breath     Causes heart to race    Hair formula [mv,iron,min-folic acid-biotin]      dye    Levaquin [levofloxacin] Other (See Comments)     Tongue swelling and blisters    Codeine Nausea Only    Doxycycline      Other reaction(s): Unknown        Current Outpatient Medications on File Prior to Visit   Medication Sig Dispense Refill    acetaminophen (TYLENOL) 500 MG tablet Take 2 tablets (1,000 mg total) by mouth every 8 (eight) hours as needed. 40 tablet 0    albuterol (PROVENTIL/VENTOLIN HFA) 90 mcg/actuation inhaler Inhale 2 puffs into the lungs every 4 (four) hours as needed for Wheezing. Rescue 8 g 1    ALBUTEROL INHL Inhale 1 puff into the lungs as needed.      azelastine (ASTELIN) 137 mcg (0.1 %) nasal spray 137 sprays by Nasal route once daily.      cholecalciferol, vitamin D3, 125 mcg (5,000 unit) Tab Take 1 tablet by mouth every evening.      coenzyme Q10 100 mg capsule Place 100 mg under the tongue once daily.      EPINEPHrine (EPIPEN) 0.3 mg/0.3 mL AtIn Inject 0.3 mLs into the muscle as needed.      FLUoxetine 20 MG capsule Take 20 mg by mouth once daily.   2    fluticasone (FLONASE) 50 mcg/actuation nasal spray 1 spray by Each Nostril route daily as needed.       HYDROcodone-acetaminophen (NORCO) 5-325 mg per tablet Take 5 tablets by mouth as needed.      ipratropium (ATROVENT) 0.03 % nasal spray 2 sprays by Nasal route daily as needed.       isosorbide mononitrate (IMDUR) 30 MG 24 hr tablet Take 2 tablets (60 mg total) by mouth once daily. 90 tablet 3    KRILL OIL ORAL Take by mouth.      Lactobacillus rhamnosus GG (CULTURELLE) 10 billion cell capsule Take 1 capsule by mouth once daily.      levomefolate calcium (ELFOLATE) 15 mg Tab Take 15 mg by mouth once daily.      LIDOcaine (LIDODERM) 5 % Place 1 patch onto the skin once daily. Remove & Discard patch within 12 hours or as directed by MD 5 patch 0    loratadine (CLARITIN) 10 mg tablet Take 10 mg by mouth once daily.      losartan-hydrochlorothiazide 100-25 mg (HYZAAR) 100-25 mg per tablet TAKE 1 TABLET BY MOUTH EVERY DAY 90 tablet 3    magnesium 30 mg tablet Take 30 mg by mouth once daily.      MAGNESIUM L-THREONATE ORAL Take 144 mg by mouth.      mecobalamin, vitamin B12, 1,000 mcg  Chew Take 1,000 mcg by mouth once daily.      metroNIDAZOLE (FLAGYL) 500 MG tablet Take 500 mg by mouth as needed.      multivitamin (THERAGRAN) per tablet Take 1 tablet by mouth once daily.      mupirocin (BACTROBAN) 2 % ointment Apply 2 Tubes topically as needed.      mv-mn/folic acid/vit [id number] (ALIVE ONCE DAILY WOMEN 50 PLUS ORAL) Take 1 tablet by mouth once daily.      naproxen (NAPROSYN) 500 MG tablet Take 1 tablet by mouth as needed.      om 3/E/linol/ala/oleic/gla/lip (OMEGA 3-6-9 ORAL) Take by mouth.      oxyCODONE (ROXICODONE) 5 MG immediate release tablet Take 1 tablet (5 mg total) by mouth every 6 (six) hours as needed for Pain. 8 tablet 0    potassium chloride (MICRO-K) 10 MEQ CpSR TAKE 1 CAPSULE BY MOUTH TWICE A  capsule 3    potassium chloride SA (K-DUR,KLOR-CON M) 10 MEQ tablet Take 10 capsules by mouth once daily.      terazosin (HYTRIN) 2 MG capsule Take 1 capsule (2 mg total) by mouth every evening. 90 capsule 3    terazosin (HYTRIN) 5 MG capsule Take 1 capsule (5 mg total) by mouth every evening. 90 capsule 3    TIROSINT 88 mcg Cap Take 88 mcg by mouth once daily.       turmeric, bulk, 100 % Powd Take 1 capsule by mouth once daily.      vitamin E 400 UNIT capsule Take 1 capsule by mouth once daily.       No current facility-administered medications on file prior to visit.       Objective:      BP (!) 159/77 (BP Location: Right arm, Patient Position: Sitting)   Pulse 88   SpO2 95%     Exam:  GEN:  Well developed, well nourished.  No acute distress.  Normal pain behavior.  HEENT:  No trauma.  Mucous membranes moist.  Nares patent bilaterally.  PSYCH: Normal affect. Thought content appropriate.  CHEST:  Breathing symmetric.  No audible wheezing.  ABD: Soft, non-distended.  SKIN:  Warm, pink, dry.  No rash on exposed areas.    EXT:  No cyanosis, clubbing, or edema.  No color change or changes in nail or hair growth.  NEURO/MUSCULOSKELETAL:  Fully alert, oriented, and appropriate. Speech  normal ricki. No cranial nerve deficits.   Gait:  Normal.  5/5 motor strength throughout upper extremities.   Sensory:  No sensory deficit in the upper extremities.   Reflexes:  1 + and symmetric throughout.  Absent White's bilaterally.  C-Spine:  Limited ROM with pain on flexion and extension.  Positive facet loading bilaterally.  Negative Spurling's bilaterally.    Diffusely TTP over midline cervical spine, cervical paraspinal muscles      Imaging:    Narrative & Impression    EXAMINATION:  XR LUMBAR SPINE AP AND LATERAL     CLINICAL HISTORY:  back pain;     TECHNIQUE:  AP, lateral and spot images were performed of the lumbar spine.     COMPARISON:  11/06/2013, CT abdomen and pelvis 05/08/2023     FINDINGS:  Three views lumbar spine.     Lateral imaging demonstrates adequate alignment of the lumbar spine without significant vertebral body height loss.  There is multilevel disc space height loss most significantly involving L2-L3, L3-L4, L4-L5 and L5-S1.  The facet joints are aligned noting lower lumbar facet arthropathy.  The sacral segments are aligned.  AP spinal alignment is remarkable for levo scoliotic curvature of the spine.  The bilateral sacroiliac joints are intact.  Surgical change projects over the abdomen.  There is calcification of the aorta.     Impression:     1. Multilevel degenerative changes of the spine, no convincing acute displaced fracture or dislocation.        Electronically signed by: Dennis Crabtree MD  Date:                                            09/27/2023  Time:                                           14:34       Assessment:       Encounter Diagnoses   Name Primary?    DDD (degenerative disc disease), lumbar     Lumbar spondylosis Yes    DDD (degenerative disc disease), cervical     Cervical spondylosis          Plan:       Iram was seen today for back pain.    Diagnoses and all orders for this visit:    Lumbar spondylosis    DDD (degenerative disc disease), lumbar  -      Ambulatory referral/consult to Pain Clinic    DDD (degenerative disc disease), cervical  -     X-Ray Cervical Spine AP Lat with Flexion  Extension; Future    Cervical spondylosis  -     Ambulatory referral/consult to Physical/Occupational Therapy; Future  -     X-Ray Cervical Spine AP Lat with Flexion  Extension; Future        Iram Aguirre is a 78 y.o. female with chronic diffuse spine pain.  Most of these pains as located the cervical region.  Pain appears to be mostly axial.  I suspect she has some degree of cervical facet mediated pain but also likely has pain related to degenerative disc disease.  Not having overt radicular symptoms..    Cervical x-rays.    Refer to PT for cervical and lumbar ROM, strengthening, stretching and HEP.  Return to clinic in 8 weeks or sooner if needed.  At that time we will discuss efficacy of physical therapy/home exercise program.  May consider cervical MRI and possibly interventional procedures if pain persists or worsens.          This note was created by combination of typed  and M-Modal dictation. Transcription and phonetic errors may be present.  If there are any questions, please contact me.

## 2024-02-09 ENCOUNTER — OFFICE VISIT (OUTPATIENT)
Dept: SPORTS MEDICINE | Facility: CLINIC | Age: 79
End: 2024-02-09
Payer: MEDICARE

## 2024-02-09 ENCOUNTER — TELEPHONE (OUTPATIENT)
Dept: SPORTS MEDICINE | Facility: CLINIC | Age: 79
End: 2024-02-09
Payer: MEDICARE

## 2024-02-09 VITALS
DIASTOLIC BLOOD PRESSURE: 69 MMHG | WEIGHT: 185.19 LBS | HEART RATE: 83 BPM | SYSTOLIC BLOOD PRESSURE: 126 MMHG | HEIGHT: 60 IN | BODY MASS INDEX: 36.36 KG/M2

## 2024-02-09 DIAGNOSIS — M17.12 PRIMARY OSTEOARTHRITIS OF LEFT KNEE: ICD-10-CM

## 2024-02-09 DIAGNOSIS — M17.11 PRIMARY OSTEOARTHRITIS OF RIGHT KNEE: Primary | ICD-10-CM

## 2024-02-09 PROCEDURE — 99213 OFFICE O/P EST LOW 20 MIN: CPT | Mod: S$PBB,,, | Performed by: ORTHOPAEDIC SURGERY

## 2024-02-09 PROCEDURE — 99999 PR PBB SHADOW E&M-EST. PATIENT-LVL V: CPT | Mod: PBBFAC,,, | Performed by: ORTHOPAEDIC SURGERY

## 2024-02-09 PROCEDURE — 99215 OFFICE O/P EST HI 40 MIN: CPT | Mod: PBBFAC | Performed by: ORTHOPAEDIC SURGERY

## 2024-02-09 NOTE — PROGRESS NOTES
Subjective:     Chief Complaint: Iram Aguirre is a 78 y.o. female who had concerns including Follow-up of the Right Knee.    HPI    Patient presents today for follow-up of bilateral knee pain.  Patient received bilateral Durolane injections at her last visit approximately 3 months ago.  Patient reports this has significantly decreased her pain.  Symptoms are very mild and only present during quick turns and pivoting of the knee.  She reports the knee as 4/10 at its worst.    Interval history 09/23/2023:  Patient presents to clinic with chronic left knee pain x several years. Patient states the pain began gradually and is localized along the lateral aspect of the knee at the joint line. She denies any WILLIAM or traumatic event.  She states she is previously seen with PA providers and undergone injections as well as physical therapy.  She is had good success with viscosupplementation injections in the past.  She rates the pain as 5/10.  She has attempted multiple conservative measures that include activity modification, ice & elevation, and anti-inflammatories with little relief. Is affecting ADLs and limiting desired level of activity. Denies numbness, tingling, radiation, and inability to bear weight. She is here today to discuss treatment options.    No previous surgeries or trauma on bilateral knees    Review of Systems   Constitutional: Negative.   HENT: Negative.     Eyes: Negative.    Cardiovascular: Negative.    Respiratory: Negative.     Endocrine: Negative.    Hematologic/Lymphatic: Negative.    Skin: Negative.    Musculoskeletal:  Positive for arthritis, joint pain, muscle weakness and stiffness. Negative for falls and joint swelling.   Neurological: Negative.    Psychiatric/Behavioral: Negative.     Allergic/Immunologic: Negative.        Pain Related Questions  Over the past 3 days, what was your highest pain level?: 2  Over the past 3 days, what was your lowest pain level? : 2    Other  How many nights  a week are you awakened by your affected body part?: 2  Was the patient's HEIGHT measured or patient reported?: Patient Reported  Was the patient's WEIGHT measured or patient reported?: Measured    Objective:     General: Iram is well-developed, well-nourished, appears stated age, in no acute distress, alert and oriented to time, place and person.     General    Nursing note and vitals reviewed.  Constitutional: She is oriented to person, place, and time. She appears well-developed and well-nourished. No distress.   HENT:   Head: Normocephalic and atraumatic.   Nose: Nose normal.   Eyes: EOM are normal.   Cardiovascular:  Intact distal pulses.            Pulmonary/Chest: Effort normal. No respiratory distress.   Neurological: She is alert and oriented to person, place, and time.   Psychiatric: She has a normal mood and affect. Her behavior is normal. Judgment and thought content normal.           Right Knee Exam     Inspection   Erythema: absent  Scars: absent  Swelling: absent  Effusion: absent  Deformity: absent  Bruising: absent    Tenderness   The patient is experiencing no tenderness.     Range of Motion   Extension:  -5   Flexion:  140     Tests   Meniscus   Molina:  Medial - negative Lateral - negative  Ligament Examination   Lachman: normal (-1 to 2mm)   PCL-Posterior Drawer: normal (0 to 2mm)     MCL - Valgus: normal (0 to 2mm)  LCL - Varus: normal  Patella   Patellar apprehension: negative  Passive Patellar Tilt: neutral  Patellar Tracking: normal  Patellar Grind: negative    Other   Sensation: normal    Left Knee Exam     Inspection   Erythema: absent  Scars: absent  Swelling: absent  Effusion: absent  Deformity: absent  Bruising: absent    Tenderness   The patient tender to palpation of the medial joint line and lateral joint line.    Range of Motion   Extension:  0   Flexion:  120     Tests   Meniscus   Molina:  Medial - negative Lateral - negative  Stability   Lachman: normal (-1 to 2mm)    PCL-Posterior Drawer: normal (0 to 2mm)  MCL - Valgus: normal (0 to 2mm)  LCL - Varus: normal (0 to 2mm)  Patella   Patellar apprehension: negative  Passive Patellar Tilt: neutral  Patellar Tracking: normal  Patellar Grind: negative    Other   Sensation: normal    Muscle Strength   Right Lower Extremity   Quadriceps:  5/5   Hamstrin/5   Left Lower Extremity   Quadriceps:  5/5   Hamstrin/5     Vascular Exam     Right Pulses  Dorsalis Pedis:      2+  Posterior Tibial:      2+        Left Pulses  Dorsalis Pedis:      2+  Posterior Tibial:      2+        Radiographs bilateral knee     My interpretation:    Moderate joint space narrowing seen in medial and lateral compartments of both knees with worse in lateral compartment of left knee.  Tricompartmental osteophyte formation    Kellgren Vladimir grade 4, bilateral      Assessment:     Encounter Diagnoses   Name Primary?    Primary osteoarthritis of right knee Yes    Primary osteoarthritis of left knee           Plan:       1. We can discussed a variety of treatment options including medications, injections, physical therapy and other alternative treatments. I also explained the indications, risks and benefits of surgery. Patient's pain is refractory HEP, conservative management, and NSAIDs. Had a lengthy discussion about osteoarthritis in the knees to include the primary causes to include excessive weight, trauma, genetics, and muscle weakness as well as the different forms of treatment used to help alleviate symptoms including CSI, physical therapy, and Visco supplementation injections.  At this time, Pt would like to proceed with her home exercise program with possible repeat Durolane injections in the future.    2.  Ice compress to the affected area 2-3x a day for 15-20 minutes as needed for pain management.  Anti-inflammatories prn pain.    5. RTC to see Negro Bernstein PA-C p.r.n..  Will contact patient in 10 weeks to determine if she would like  repeat Durolane injections.    All of the patient's questions were answered and the patient will contact us if they have any questions or concerns in the interim.        Patient questionnaires may have been collected.

## 2024-03-08 LAB — BCS RECOMMENDATION EXT: NORMAL

## 2024-03-25 RX ORDER — LOSARTAN POTASSIUM AND HYDROCHLOROTHIAZIDE 25; 100 MG/1; MG/1
TABLET ORAL
Qty: 90 TABLET | Refills: 3 | Status: SHIPPED | OUTPATIENT
Start: 2024-03-25

## 2024-04-23 ENCOUNTER — TELEPHONE (OUTPATIENT)
Dept: SPORTS MEDICINE | Facility: CLINIC | Age: 79
End: 2024-04-23
Payer: MEDICARE

## 2024-04-23 DIAGNOSIS — M17.12 PRIMARY OSTEOARTHRITIS OF LEFT KNEE: ICD-10-CM

## 2024-04-23 DIAGNOSIS — M17.11 PRIMARY OSTEOARTHRITIS OF RIGHT KNEE: Primary | ICD-10-CM

## 2024-04-23 NOTE — TELEPHONE ENCOUNTER
Left VM to patient to see if she would like to get repeat bilateral Durolane injections since it's been about 6 months from her last ones. Gave callback number of [phone] if she would like to get scheduled.

## 2024-04-23 NOTE — TELEPHONE ENCOUNTER
Spoke with patient and scheduled her injections for 2 weeks out for insurance approval.     ----- Message from Shane Hope MA sent at 4/23/2024 10:24 AM CDT -----  Regarding: Injection appt  Contact: at  [phone]  Pt is calling to speak with someone in provider office is asking for a return call regarding an appt  for her  knee injections please call pt at  [phone]

## 2024-05-09 ENCOUNTER — OFFICE VISIT (OUTPATIENT)
Dept: SPORTS MEDICINE | Facility: CLINIC | Age: 79
End: 2024-05-09
Payer: MEDICARE

## 2024-05-09 VITALS
DIASTOLIC BLOOD PRESSURE: 65 MMHG | HEART RATE: 74 BPM | BODY MASS INDEX: 35.23 KG/M2 | SYSTOLIC BLOOD PRESSURE: 129 MMHG | WEIGHT: 179.44 LBS | HEIGHT: 60 IN

## 2024-05-09 DIAGNOSIS — M17.12 PRIMARY OSTEOARTHRITIS OF LEFT KNEE: ICD-10-CM

## 2024-05-09 DIAGNOSIS — M17.11 PRIMARY OSTEOARTHRITIS OF RIGHT KNEE: Primary | ICD-10-CM

## 2024-05-09 PROCEDURE — 20610 DRAIN/INJ JOINT/BURSA W/O US: CPT | Mod: 50,S$PBB,, | Performed by: ORTHOPAEDIC SURGERY

## 2024-05-09 PROCEDURE — 99214 OFFICE O/P EST MOD 30 MIN: CPT | Mod: PBBFAC | Performed by: ORTHOPAEDIC SURGERY

## 2024-05-09 PROCEDURE — 99999 PR PBB SHADOW E&M-EST. PATIENT-LVL IV: CPT | Mod: PBBFAC,,, | Performed by: ORTHOPAEDIC SURGERY

## 2024-05-09 PROCEDURE — 20610 DRAIN/INJ JOINT/BURSA W/O US: CPT | Mod: 50,PBBFAC | Performed by: ORTHOPAEDIC SURGERY

## 2024-05-09 PROCEDURE — 99499 UNLISTED E&M SERVICE: CPT | Mod: S$PBB,,, | Performed by: ORTHOPAEDIC SURGERY

## 2024-05-09 PROCEDURE — 99999PBSHW PR PBB SHADOW TECHNICAL ONLY FILED TO HB: Mod: JZ,PBBFAC,,

## 2024-05-09 RX ADMIN — Medication 60 MG: at 11:05

## 2024-05-09 NOTE — PROCEDURES
Large Joint Aspiration/Injection: bilateral knee    Date/Time: 5/9/2024 11:15 AM    Performed by: Jefferson Bernstein PA-C  Authorized by: Jefferson Bernstein PA-C    Consent Done?:  Yes (Verbal)  Indications:  Arthritis and pain  Site marked: the procedure site was marked    Timeout: prior to procedure the correct patient, procedure, and site was verified    Prep: patient was prepped and draped in usual sterile fashion      Local anesthesia used?: Yes    Local anesthetic:  Lidocaine spray    Details:  Needle Size:  22 G  Approach:  Anterolateral  Location:  Knee  Laterality:  Bilateral  Site:  Bilateral knee  Medications (Right):  60 mg hyaluronate sodium, stabilized (DUROLANE) 60 mg/3 mL  Medications (Left):  60 mg hyaluronate sodium, stabilized (DUROLANE) 60 mg/3 mL  Patient tolerance:  Patient tolerated the procedure well with no immediate complications

## 2024-06-25 DIAGNOSIS — I10 ESSENTIAL HYPERTENSION: Primary | ICD-10-CM

## 2024-06-25 RX ORDER — LOSARTAN POTASSIUM AND HYDROCHLOROTHIAZIDE 25; 100 MG/1; MG/1
1 TABLET ORAL DAILY
Qty: 90 TABLET | Refills: 3 | Status: SHIPPED | OUTPATIENT
Start: 2024-06-25

## 2024-06-25 NOTE — TELEPHONE ENCOUNTER
Patient last seen in the office on 12/14/23 with Carmela Santos NP  patient is requesting a refill for losartan-hydrochlorothiazide 100-25 MG

## 2024-06-25 NOTE — TELEPHONE ENCOUNTER
----- Message from Kendall Seals sent at 6/25/2024 11:17 AM CDT -----  Regarding: refill  Contact: patient  Type:  RX Refill Request    Who Called: patient  Refill or New Rx:refill  RX Name and Strength:losartan-hydrochlorothiazide 100-25 mg (HYZAAR) 100-25 mg per tablet  How is the patient currently taking it? (ex. 1XDay):  Is this a 30 day or 90 day RX:90  Preferred Pharmacy with phone number:  [facility]/pharmacy #[id number] - [place], [place] - [address]  [address] [place] [address]  Phone: [phone] Fax: [phone]  Local or Mail Order:local  Ordering Provider:Tom  Would the patient rather a call back or a response via [facility]? refill  Best Call Back Number:[phone]  Additional Information:

## 2024-07-02 ENCOUNTER — LAB VISIT (OUTPATIENT)
Dept: LAB | Facility: HOSPITAL | Age: 79
End: 2024-07-02
Attending: INTERNAL MEDICINE
Payer: MEDICARE

## 2024-07-02 ENCOUNTER — OFFICE VISIT (OUTPATIENT)
Dept: FAMILY MEDICINE | Facility: CLINIC | Age: 79
End: 2024-07-02
Payer: MEDICARE

## 2024-07-02 VITALS
BODY MASS INDEX: 35.53 KG/M2 | SYSTOLIC BLOOD PRESSURE: 138 MMHG | TEMPERATURE: 98 F | WEIGHT: 181 LBS | HEART RATE: 76 BPM | DIASTOLIC BLOOD PRESSURE: 62 MMHG | HEIGHT: 60 IN | OXYGEN SATURATION: 96 %

## 2024-07-02 DIAGNOSIS — E03.9 HYPOTHYROIDISM, UNSPECIFIED TYPE: ICD-10-CM

## 2024-07-02 DIAGNOSIS — E66.01 CLASS 2 SEVERE OBESITY DUE TO EXCESS CALORIES WITH SERIOUS COMORBIDITY AND BODY MASS INDEX (BMI) OF 35.0 TO 35.9 IN ADULT: ICD-10-CM

## 2024-07-02 DIAGNOSIS — G47.33 OBSTRUCTIVE SLEEP APNEA ON CPAP: ICD-10-CM

## 2024-07-02 DIAGNOSIS — E55.9 VITAMIN D DEFICIENCY DISEASE: ICD-10-CM

## 2024-07-02 DIAGNOSIS — M47.812 CERVICAL SPONDYLOSIS: ICD-10-CM

## 2024-07-02 DIAGNOSIS — E78.00 HYPERCHOLESTEREMIA: ICD-10-CM

## 2024-07-02 DIAGNOSIS — Z00.00 HEALTHCARE MAINTENANCE: ICD-10-CM

## 2024-07-02 DIAGNOSIS — I10 ESSENTIAL HYPERTENSION: ICD-10-CM

## 2024-07-02 DIAGNOSIS — M85.80 OSTEOPENIA, UNSPECIFIED LOCATION: ICD-10-CM

## 2024-07-02 DIAGNOSIS — R79.9 ABNORMAL FINDING OF BLOOD CHEMISTRY, UNSPECIFIED: ICD-10-CM

## 2024-07-02 DIAGNOSIS — I35.0 NONRHEUMATIC AORTIC VALVE STENOSIS: ICD-10-CM

## 2024-07-02 DIAGNOSIS — Z00.00 HEALTHCARE MAINTENANCE: Primary | ICD-10-CM

## 2024-07-02 DIAGNOSIS — J30.2 SEASONAL ALLERGIC RHINITIS, UNSPECIFIED TRIGGER: ICD-10-CM

## 2024-07-02 DIAGNOSIS — M51.36 DDD (DEGENERATIVE DISC DISEASE), LUMBAR: ICD-10-CM

## 2024-07-02 DIAGNOSIS — I83.93 VARICOSE VEINS OF BOTH LOWER EXTREMITIES, UNSPECIFIED WHETHER COMPLICATED: ICD-10-CM

## 2024-07-02 LAB
ALBUMIN SERPL BCP-MCNC: 3.5 G/DL (ref 3.5–5.2)
ALP SERPL-CCNC: 66 U/L (ref 55–135)
ALT SERPL W/O P-5'-P-CCNC: 20 U/L (ref 10–44)
ANION GAP SERPL CALC-SCNC: 8 MMOL/L (ref 8–16)
AST SERPL-CCNC: 28 U/L (ref 10–40)
BASOPHILS # BLD AUTO: 0.05 K/UL (ref 0–0.2)
BASOPHILS NFR BLD: 0.9 % (ref 0–1.9)
BILIRUB SERPL-MCNC: 0.5 MG/DL (ref 0.1–1)
BUN SERPL-MCNC: 13 MG/DL (ref 8–23)
CALCIUM SERPL-MCNC: 10.1 MG/DL (ref 8.7–10.5)
CHLORIDE SERPL-SCNC: 105 MMOL/L (ref 95–110)
CHOLEST SERPL-MCNC: 234 MG/DL (ref 120–199)
CHOLEST/HDLC SERPL: 3.7 {RATIO} (ref 2–5)
CO2 SERPL-SCNC: 27 MMOL/L (ref 23–29)
CREAT SERPL-MCNC: 0.9 MG/DL (ref 0.5–1.4)
DIFFERENTIAL METHOD BLD: ABNORMAL
EOSINOPHIL # BLD AUTO: 0.2 K/UL (ref 0–0.5)
EOSINOPHIL NFR BLD: 3.9 % (ref 0–8)
ERYTHROCYTE [DISTWIDTH] IN BLOOD BY AUTOMATED COUNT: 13.5 % (ref 11.5–14.5)
EST. GFR  (NO RACE VARIABLE): >60 ML/MIN/1.73 M^2
GLUCOSE SERPL-MCNC: 108 MG/DL (ref 70–110)
HCT VFR BLD AUTO: 37.6 % (ref 37–48.5)
HDLC SERPL-MCNC: 64 MG/DL (ref 40–75)
HDLC SERPL: 27.4 % (ref 20–50)
HGB BLD-MCNC: 11.8 G/DL (ref 12–16)
IMM GRANULOCYTES # BLD AUTO: 0 K/UL (ref 0–0.04)
IMM GRANULOCYTES NFR BLD AUTO: 0 % (ref 0–0.5)
LDLC SERPL CALC-MCNC: 152.6 MG/DL (ref 63–159)
LYMPHOCYTES # BLD AUTO: 2.2 K/UL (ref 1–4.8)
LYMPHOCYTES NFR BLD: 37.7 % (ref 18–48)
MCH RBC QN AUTO: 31.1 PG (ref 27–31)
MCHC RBC AUTO-ENTMCNC: 31.4 G/DL (ref 32–36)
MCV RBC AUTO: 99 FL (ref 82–98)
MONOCYTES # BLD AUTO: 0.6 K/UL (ref 0.3–1)
MONOCYTES NFR BLD: 10.9 % (ref 4–15)
NEUTROPHILS # BLD AUTO: 2.7 K/UL (ref 1.8–7.7)
NEUTROPHILS NFR BLD: 46.6 % (ref 38–73)
NONHDLC SERPL-MCNC: 170 MG/DL
NRBC BLD-RTO: 0 /100 WBC
PLATELET # BLD AUTO: 233 K/UL (ref 150–450)
PMV BLD AUTO: 9.8 FL (ref 9.2–12.9)
POTASSIUM SERPL-SCNC: 3.8 MMOL/L (ref 3.5–5.1)
PROT SERPL-MCNC: 7 G/DL (ref 6–8.4)
RBC # BLD AUTO: 3.8 M/UL (ref 4–5.4)
SODIUM SERPL-SCNC: 140 MMOL/L (ref 136–145)
TRIGL SERPL-MCNC: 87 MG/DL (ref 30–150)
TSH SERPL DL<=0.005 MIU/L-ACNC: 0.73 UIU/ML (ref 0.4–4)
WBC # BLD AUTO: 5.7 K/UL (ref 3.9–12.7)

## 2024-07-02 PROCEDURE — 99204 OFFICE O/P NEW MOD 45 MIN: CPT | Mod: S$PBB,,, | Performed by: INTERNAL MEDICINE

## 2024-07-02 PROCEDURE — 99213 OFFICE O/P EST LOW 20 MIN: CPT | Mod: PBBFAC,PN | Performed by: INTERNAL MEDICINE

## 2024-07-02 PROCEDURE — 83036 HEMOGLOBIN GLYCOSYLATED A1C: CPT | Performed by: INTERNAL MEDICINE

## 2024-07-02 PROCEDURE — 80061 LIPID PANEL: CPT | Performed by: INTERNAL MEDICINE

## 2024-07-02 PROCEDURE — 80053 COMPREHEN METABOLIC PANEL: CPT | Performed by: INTERNAL MEDICINE

## 2024-07-02 PROCEDURE — 85025 COMPLETE CBC W/AUTO DIFF WBC: CPT | Performed by: INTERNAL MEDICINE

## 2024-07-02 PROCEDURE — 82306 VITAMIN D 25 HYDROXY: CPT | Performed by: INTERNAL MEDICINE

## 2024-07-02 PROCEDURE — 36415 COLL VENOUS BLD VENIPUNCTURE: CPT | Mod: PN | Performed by: INTERNAL MEDICINE

## 2024-07-02 PROCEDURE — 84443 ASSAY THYROID STIM HORMONE: CPT | Performed by: INTERNAL MEDICINE

## 2024-07-02 PROCEDURE — 99999 PR PBB SHADOW E&M-EST. PATIENT-LVL III: CPT | Mod: PBBFAC,,, | Performed by: INTERNAL MEDICINE

## 2024-07-02 PROCEDURE — 86803 HEPATITIS C AB TEST: CPT | Performed by: INTERNAL MEDICINE

## 2024-07-02 RX ORDER — FLUTICASONE PROPIONATE 50 MCG
1 SPRAY, SUSPENSION (ML) NASAL DAILY PRN
Qty: 9.9 ML | Refills: 5 | Status: SHIPPED | OUTPATIENT
Start: 2024-07-02

## 2024-07-02 RX ORDER — AZELASTINE 1 MG/ML
1 SPRAY, METERED NASAL DAILY
Qty: 30 ML | Refills: 5 | Status: SHIPPED | OUTPATIENT
Start: 2024-07-02

## 2024-07-02 NOTE — PROGRESS NOTES
HISTORY OF PRESENT ILLNESS:  Iram Aguirre is a 78 y.o. female who presents to the clinic today for Follow-up    This is my first encounter with patient.    Accompanied by family member.    Osteoarthritis  Seen by sports medicine.    Lumbar spondylosis  Seen by pain mgmt.  Referred to PT.    Osteoporosis, hypothyroidism  Seen by endo [place] MS.  Zoledronic acid (Reclast) in 2009 and Ibandronate (Boniva) 150 mg/month. The reason(s) for stopping the medication(s) include development or worsening of GERD and/or esophageal symptoms. The patient's current bone related medication includes Zoledronic acid (Reclast) 5 mg IV infusion.   On Tirosint 88 mcg.    Aortic valve stenosis  Seen by cardiology 12/2023 - [facility].    HTN  Managed with losartan/HCTZ, isosorbide mononitrate, terazosin.    Aspergillosis  Follow with ENT in [place] - Dr. Jeferson Tabor.    PAST MEDICAL HISTORY:  Past Medical History:   Diagnosis Date    AR (allergic rhinitis) 11/29/2012    Aspergillosis     1992    Benign hypertension     Depression     Diverticulosis 2005    Esophageal polyp     Fibromyalgia     Gastric ulcer; benign 2007    GERD (gastroesophageal reflux disease) 11/29/2012    Hypercholesteremia     Hypothyroidism     Kidney stones     MTHFR gene mutation     per daughter E72.8    Obstructive sleep apnea on CPAP     Osteopenia     Rectal polyp     Skin cancer     pre skin cancer skin    Vulvovaginal melanosis        PAST SURGICAL HISTORY:  Past Surgical History:   Procedure Laterality Date    CHOLECYSTECTOMY  08/08/2012    CHOLECYSTECTOMY      COLONOSCOPY N/A 02/06/2017    Procedure: COLONOSCOPY;  Surgeon: Arturo Crane MD;  Location: [facility];  Service: Endoscopy;  Laterality: N/A;    FOOT FRACTURE SURGERY  2005    plate in right foot    GANGLION CYST EXCISION  1980    left wrist    HYSTERECTOMY      KNEE ARTHROSCOPY W/ ACL RECONSTRUCTION      bilateral     NASAL SEPTUM SURGERY      TEAR DUCT SURGERY  2006    left  eye    TOTAL VAGINAL HYSTERECTOMY  age 31    w/BSO    TUBAL LIGATION  age 26       SOCIAL HISTORY:  Social History     Socioeconomic History    Marital status:     Number of children: 3   Occupational History    Occupation: teacher, phone  - retired   Tobacco Use    Smoking status: Never    Smokeless tobacco: Never   Substance and Sexual Activity    Alcohol use: No    Drug use: No    Sexual activity: Not Currently     Partners: Male     Birth control/protection: None     Social Determinants of Health     Financial Resource Strain: High Risk (12/4/2023)    Overall Financial Resource Strain (CARDIA)     Difficulty of Paying Living Expenses: Hard   Food Insecurity: Food Insecurity Present (12/4/2023)    Hunger Vital Sign     Worried About Running Out of Food in the Last Year: Sometimes true     Ran Out of Food in the Last Year: Patient declined   Transportation Needs: No Transportation Needs (12/4/2023)    PRAPARE - Transportation     Lack of Transportation (Medical): No     Lack of Transportation (Non-Medical): No   Physical Activity: Unknown (12/4/2023)    Exercise Vital Sign     Days of Exercise per Week: 0 days   Stress: No Stress Concern Present (12/4/2023)    [employer] - Occupational Stress Questionnaire     Feeling of Stress : Not at all   Housing Stability: Low Risk  (12/4/2023)    Housing Stability Vital Sign     Unable to Pay for Housing in the Last Year: No     Number of Places Lived in the Last Year: 1     Unstable Housing in the Last Year: No       FAMILY HISTORY:  Family History   Problem Relation Name Age of Onset    Kidney disease Mother      Heart disease Father      Lymphoma Sister      Bladder Cancer Brother      Leukemia Brother         ALLERGIES AND MEDICATIONS: updated and reviewed.  Review of patient's allergies indicates:   Allergen Reactions    Cortisone Shortness Of Breath     Causes heart to race    Hair formula [mv,iron,min-folic acid-biotin]       dye    Levaquin [levofloxacin] Other (See Comments)     Tongue swelling and blisters    Codeine Nausea Only    Doxycycline      Other reaction(s): Unknown     Medication List with Changes/Refills   Current Medications    ACETAMINOPHEN (TYLENOL) 500 MG TABLET    Take 2 tablets (1,000 mg total) by mouth every 8 (eight) hours as needed.    ALBUTEROL (PROVENTIL/VENTOLIN HFA) 90 MCG/ACTUATION INHALER    Inhale 2 puffs into the lungs every 4 (four) hours as needed for Wheezing. Rescue    ALBUTEROL INHL    Inhale 1 puff into the lungs as needed.    CHOLECALCIFEROL, VITAMIN D3, 125 MCG (5,000 UNIT) TAB    Take 1 tablet by mouth every evening.    COENZYME Q10 100 MG CAPSULE    Place 100 mg under the tongue once daily.    EPINEPHRINE (EPIPEN) 0.3 MG/0.3 ML ATIN    Inject 0.3 mLs into the muscle as needed.    FLUOXETINE 20 MG CAPSULE    Take 20 mg by mouth once daily.     GLUCOSAMINE/CHONDR FRASER A SOD (OSTEO BI-FLEX ORAL)    Take by mouth.    IPRATROPIUM (ATROVENT) 0.03 % NASAL SPRAY    2 sprays by Nasal route daily as needed.     ISOSORBIDE MONONITRATE (IMDUR) 30 MG 24 HR TABLET    Take 2 tablets (60 mg total) by mouth once daily.    KRILL OIL ORAL    Take by mouth.    LACTOBACILLUS RHAMNOSUS GG (CULTURELLE) 10 BILLION CELL CAPSULE    Take 1 capsule by mouth once daily.    LEVOMEFOLATE CALCIUM (ELFOLATE) 15 MG TAB    Take 15 mg by mouth once daily.    LIDOCAINE (LIDODERM) 5 %    Place 1 patch onto the skin once daily. Remove & Discard patch within 12 hours or as directed by MD    LORATADINE (CLARITIN) 10 MG TABLET    Take 10 mg by mouth once daily.    LOSARTAN-HYDROCHLOROTHIAZIDE 100-25 MG (HYZAAR) 100-25 MG PER TABLET    Take 1 tablet by mouth once daily.    MAGNESIUM 30 MG TABLET    Take 30 mg by mouth once daily.    MAGNESIUM L-THREONATE ORAL    Take 144 mg by mouth.    MECOBALAMIN, VITAMIN B12, 1,000 MCG CHEW    Take 1,000 mcg by mouth once daily.    METRONIDAZOLE (FLAGYL) 500 MG TABLET    Take 500 mg by mouth as  needed.    MUPIROCIN (BACTROBAN) 2 % OINTMENT    Apply 2 Tubes topically as needed.    MV-MN/FOLIC ACID/VIT [id number] (ALIVE ONCE DAILY WOMEN 50 PLUS ORAL)    Take 1 tablet by mouth once daily.    NAPROXEN (NAPROSYN) 500 MG TABLET    Take 1 tablet by mouth as needed.    OM 3/E/LINOL/ALA/OLEIC/GLA/LIP (OMEGA 3-6-9 ORAL)    Take by mouth.    POTASSIUM CHLORIDE (MICRO-K) 10 MEQ CPSR    TAKE 1 CAPSULE BY MOUTH TWICE A DAY    TERAZOSIN (HYTRIN) 2 MG CAPSULE    Take 1 capsule (2 mg total) by mouth every evening.    TERAZOSIN (HYTRIN) 5 MG CAPSULE    Take 1 capsule (5 mg total) by mouth every evening.    TIROSINT 88 MCG CAP    Take 88 mcg by mouth once daily.     TURMERIC, BULK, 100 % POWD    Take 1 capsule by mouth once daily.    VITAMIN E 400 UNIT CAPSULE    Take 1 capsule by mouth once daily.   Changed and/or Refilled Medications    Modified Medication Previous Medication    AZELASTINE (ASTELIN) 137 MCG (0.1 %) NASAL SPRAY azelastine (ASTELIN) 137 mcg (0.1 %) nasal spray       1 spray (137 mcg total) by Nasal route once daily.    137 sprays by Nasal route once daily.    FLUTICASONE PROPIONATE (FLONASE) 50 MCG/ACTUATION NASAL SPRAY fluticasone (FLONASE) 50 mcg/actuation nasal spray       1 spray (50 mcg total) by Each Nostril route daily as needed for Rhinitis or Allergies.    1 spray by Each Nostril route daily as needed.    Discontinued Medications    HYDROCODONE-ACETAMINOPHEN (NORCO) 5-325 MG PER TABLET    Take 5 tablets by mouth as needed.    MULTIVITAMIN (THERAGRAN) PER TABLET    Take 1 tablet by mouth once daily.    OXYCODONE (ROXICODONE) 5 MG IMMEDIATE RELEASE TABLET    Take 1 tablet (5 mg total) by mouth every 6 (six) hours as needed for Pain.    POTASSIUM CHLORIDE SA (K-DUR,KLOR-CON M) 10 MEQ TABLET    Take 10 capsules by mouth once daily.          CARE TEAM:  Patient Care Team:  Gato Spain MD as PCP - General (Internal Medicine)  Real Patrick MD as Consulting Physician (Cardiology)  Jeferson Bourne  MD CARO as Consulting Physician (Gastroenterology)  Bassem Day MD as Consulting Physician (Endocrinology)  Cheri Perez MD (Obstetrics and Gynecology)         REVIEW OF SYSTEMS:  Review of Systems   Constitutional:  Negative for chills and fever.   HENT:  Negative for congestion and postnasal drip.    Eyes:  Negative for photophobia and visual disturbance.   Respiratory:  Negative for cough and shortness of breath.    Gastrointestinal:  Negative for abdominal pain, nausea and vomiting.   Genitourinary:  Negative for dysuria and frequency.   Musculoskeletal:  Positive for arthralgias and back pain.   Neurological:  Negative for light-headedness and headaches.   Psychiatric/Behavioral:  Negative for dysphoric mood. The patient is not nervous/anxious.          PHYSICAL EXAM:   Vitals:    07/02/24 1119   BP: 138/62   Pulse:    Temp:              Body mass index is 35.35 kg/m².     General appearance - alert, well appearing, and in no distress  Mental status - normal mood, behavior, speech, dress, motor activity, and thought processes  Eyes - sclera anicteric, left eye normal, right eye normal  Ears - bilateral TM's and external ear canals normal  Chest - clear to auscultation, no wheezes, rales or rhonchi, symmetric air entry  Heart - normal rate, regular rhythm, normal S1, S2, systolic murmur  Neurological - alert, oriented, normal speech, no focal findings or movement disorder noted  Extremities - peripheral pulses normal, no pedal edema, no clubbing or cyanosis      ASSESSMENT AND PLAN:  Healthcare maintenance  -     Hemoglobin A1C; Future; Expected date: 07/02/2024  -     Comprehensive Metabolic Panel; Future; Expected date: 07/02/2024  -     Lipid Panel; Future; Expected date: 07/02/2024  -     CBC Auto Differential; Future; Expected date: 07/02/2024  -     TSH; Future; Expected date: 07/02/2024  -     Vitamin D; Future; Expected date: 07/02/2024  -     Hepatitis C Antibody; Future; Expected date:  07/02/2024    DDD (degenerative disc disease), lumbar    Cervical spondylosis    Essential hypertension  -     Comprehensive Metabolic Panel; Future; Expected date: 07/02/2024    Hypercholesteremia  -     Lipid Panel; Future; Expected date: 07/02/2024    Nonrheumatic aortic valve stenosis    Hypothyroidism, unspecified type  -     TSH; Future; Expected date: 07/02/2024    Vitamin D deficiency disease  -     Vitamin D; Future; Expected date: 07/02/2024    Osteopenia, unspecified location  -     Vitamin D; Future; Expected date: 07/02/2024    Obstructive sleep apnea on CPAP    Class 2 severe obesity due to excess calories with serious comorbidity and body mass index (BMI) of 35.0 to 35.9 in adult  -     Hemoglobin A1C; Future; Expected date: 07/02/2024  -     Comprehensive Metabolic Panel; Future; Expected date: 07/02/2024  -     Lipid Panel; Future; Expected date: 07/02/2024  -     TSH; Future; Expected date: 07/02/2024  -     Vitamin D; Future; Expected date: 07/02/2024    Seasonal allergic rhinitis, unspecified trigger  -     azelastine (ASTELIN) 137 mcg (0.1 %) nasal spray; 1 spray (137 mcg total) by Nasal route once daily.  Dispense: 30 mL; Refill: 5  -     fluticasone propionate (FLONASE) 50 mcg/actuation nasal spray; 1 spray (50 mcg total) by Each Nostril route daily as needed for Rhinitis or Allergies.  Dispense: 9.9 mL; Refill: 5    Varicose veins of both lower extremities, unspecified whether complicated  -     COMPRESSION STOCKINGS    Abnormal finding of blood chemistry, unspecified  -     Hemoglobin A1C; Future; Expected date: 07/02/2024  -     CBC Auto Differential; Future; Expected date: 07/02/2024              Follow up 3 months or sooner as needed.

## 2024-07-03 LAB
25(OH)D3+25(OH)D2 SERPL-MCNC: 63 NG/ML (ref 30–96)
ESTIMATED AVG GLUCOSE: 111 MG/DL (ref 68–131)
HBA1C MFR BLD: 5.5 % (ref 4–5.6)
HCV AB SERPL QL IA: NORMAL

## 2024-07-09 DIAGNOSIS — D53.9 NUTRITIONAL ANEMIA, UNSPECIFIED: ICD-10-CM

## 2024-07-09 DIAGNOSIS — D64.9 ANEMIA, UNSPECIFIED TYPE: ICD-10-CM

## 2024-07-09 DIAGNOSIS — D75.89 MACROCYTOSIS: Primary | ICD-10-CM

## 2024-07-15 ENCOUNTER — PATIENT MESSAGE (OUTPATIENT)
Dept: ORTHOPEDICS | Facility: CLINIC | Age: 79
End: 2024-07-15
Payer: MEDICARE

## 2024-07-15 ENCOUNTER — TELEPHONE (OUTPATIENT)
Dept: FAMILY MEDICINE | Facility: CLINIC | Age: 79
End: 2024-07-15
Payer: MEDICARE

## 2024-07-15 DIAGNOSIS — M25.551 BILATERAL HIP PAIN: Primary | ICD-10-CM

## 2024-07-15 DIAGNOSIS — M25.552 BILATERAL HIP PAIN: Primary | ICD-10-CM

## 2024-07-15 NOTE — TELEPHONE ENCOUNTER
----- Message from Dez Friedman sent at 7/15/2024 12:53 PM CDT -----  Regarding: Self .[phone]  Type: Patient requesting referral     Who Called:Self     Referral to What Specialty: vascular     Reason for Referral: pain in veins, hard to walk     Does the patient want the referral with a specific physician?: malachi trevizo     [facility]     Would the patient rather a call back or a response via [facility]? Call back      Best Call Back Number:.[phone]      Additional Information:

## 2024-07-15 NOTE — TELEPHONE ENCOUNTER
Spoke to patient to let her know that message will be forwarded to provider to put in referral order and someone will contact her to let her know it has been done

## 2024-07-17 DIAGNOSIS — M79.89 LEG SWELLING: Primary | ICD-10-CM

## 2024-07-17 DIAGNOSIS — M79.606 PAIN OF LOWER EXTREMITY, UNSPECIFIED LATERALITY: ICD-10-CM

## 2024-07-22 ENCOUNTER — INITIAL CONSULT (OUTPATIENT)
Dept: VASCULAR SURGERY | Facility: CLINIC | Age: 79
End: 2024-07-22
Payer: MEDICARE

## 2024-07-22 VITALS
WEIGHT: 186.38 LBS | DIASTOLIC BLOOD PRESSURE: 70 MMHG | BODY MASS INDEX: 36.59 KG/M2 | HEIGHT: 60 IN | HEART RATE: 80 BPM | SYSTOLIC BLOOD PRESSURE: 138 MMHG

## 2024-07-22 DIAGNOSIS — I87.2 VENOUS INSUFFICIENCY OF BOTH LOWER EXTREMITIES: ICD-10-CM

## 2024-07-22 DIAGNOSIS — M79.89 LEG SWELLING: ICD-10-CM

## 2024-07-22 DIAGNOSIS — M79.606 PAIN OF LOWER EXTREMITY, UNSPECIFIED LATERALITY: ICD-10-CM

## 2024-07-22 PROCEDURE — 99215 OFFICE O/P EST HI 40 MIN: CPT | Mod: PBBFAC | Performed by: NURSE PRACTITIONER

## 2024-07-22 PROCEDURE — 99999 PR PBB SHADOW E&M-EST. PATIENT-LVL V: CPT | Mod: PBBFAC,,, | Performed by: NURSE PRACTITIONER

## 2024-07-22 NOTE — PROGRESS NOTES
Chris Patiño NP         [facility]               07/22/2024    HPI:  Iram Aguirre is a 78 y.o. female with   Patient Active Problem List   Diagnosis    Gallstones    Hypothyroidism    Hypercholesteremia    Osteopenia    Obstructive sleep apnea on CPAP    Vitamin D deficiency disease    Fibromyalgia    Rectal polyp    AR (allergic rhinitis)    GERD (gastroesophageal reflux disease)    Vulvovaginal melanosis    Hx of colonic polyps    Chest pain    Anxiety    Essential hypertension    Statin intolerance    Respiratory tract infection due to COVID-19 virus    Atypical chest pain    Cervical neuropathy    Thyroid dysfunction    Chronic low back pain with sciatica    Dyspnea on exertion    Hyperglycemia    Abnormal electrocardiogram (ECG) (EKG)    Nonrheumatic aortic valve stenosis    Bilateral carotid bruits    Vitreous degeneration of both eyes    Pseudophakia of both eyes    Dry eye syndrome of both eyes    Cervical spondylosis    DDD (degenerative disc disease), cervical    Lumbar spondylosis    DDD (degenerative disc disease), lumbar    being managed by PCP and specialists who is here today for evaluation of  BLE edema, pain and varicose / spider veins.  Patient states location is BLE .  Associated signs and symptoms include varicose and spider veins and swelling.  Quality is achy and severity is 4/10 . Symptoms began 3-4 weeks ago.  Alleviating factors elevation and niacin.  Worsening factors dependency. Symptoms do not limit patient's functional status and daily activities. - DVT history.  - venous interventions.  + low sodium diet.  - excessive water intake. - compression stockings. Pt with complaints of SOB s/p COVID.    No MI  No Stroke  Tobacco use: No    Past Medical History:   Diagnosis Date    AR (allergic rhinitis) 11/29/2012    Aspergillosis     1992    Benign hypertension     Depression     Diverticulosis 2005    Esophageal polyp     Fibromyalgia     Gastric ulcer;  benign 2007    GERD (gastroesophageal reflux disease) 11/29/2012    Hypercholesteremia     Hypothyroidism     Kidney stones     MTHFR gene mutation     per daughter E72.8    Obstructive sleep apnea on CPAP     Osteopenia     Rectal polyp     Skin cancer     pre skin cancer skin    Vulvovaginal melanosis      Past Surgical History:   Procedure Laterality Date    CHOLECYSTECTOMY  08/08/2012    CHOLECYSTECTOMY      COLONOSCOPY N/A 02/06/2017    Procedure: COLONOSCOPY;  Surgeon: Arturo Crane MD;  Location: [facility];  Service: Endoscopy;  Laterality: N/A;    FOOT FRACTURE SURGERY  2005    plate in right foot    GANGLION CYST EXCISION  1980    left wrist    HYSTERECTOMY      KNEE ARTHROSCOPY W/ ACL RECONSTRUCTION      bilateral     NASAL SEPTUM SURGERY      TEAR DUCT SURGERY  2006    left eye    TOTAL VAGINAL HYSTERECTOMY  age 31    w/BSO    TUBAL LIGATION  age 26     Family History   Problem Relation Name Age of Onset    Kidney disease Mother      Heart disease Father      Lymphoma Sister      Bladder Cancer Brother      Leukemia Brother       Social History     Socioeconomic History    Marital status:     Number of children: 3   Occupational History    Occupation: teacher, phone  - retired   Tobacco Use    Smoking status: Never    Smokeless tobacco: Never   Substance and Sexual Activity    Alcohol use: No    Drug use: No    Sexual activity: Not Currently     Partners: Male     Birth control/protection: None     Social Determinants of Health     Financial Resource Strain: High Risk (12/4/2023)    Overall Financial Resource Strain (CARDIA)     Difficulty of Paying Living Expenses: Hard   Food Insecurity: Food Insecurity Present (12/4/2023)    Hunger Vital Sign     Worried About Running Out of Food in the Last Year: Sometimes true     Ran Out of Food in the Last Year: Patient declined   Transportation Needs: No Transportation Needs (12/4/2023)    PRAPARE - Transportation     Lack of Transportation  (Medical): No     Lack of Transportation (Non-Medical): No   Physical Activity: Unknown (12/4/2023)    Exercise Vital Sign     Days of Exercise per Week: 0 days   Stress: No Stress Concern Present (12/4/2023)    [employer] - Occupational Stress Questionnaire     Feeling of Stress : Not at all   Housing Stability: Low Risk  (12/4/2023)    Housing Stability Vital Sign     Unable to Pay for Housing in the Last Year: No     Number of Places Lived in the Last Year: 1     Unstable Housing in the Last Year: No       Current Outpatient Medications:     acetaminophen (TYLENOL) 500 MG tablet, Take 2 tablets (1,000 mg total) by mouth every 8 (eight) hours as needed., Disp: 40 tablet, Rfl: 0    albuterol (PROVENTIL/VENTOLIN HFA) 90 mcg/actuation inhaler, Inhale 2 puffs into the lungs every 4 (four) hours as needed for Wheezing. Rescue, Disp: 8 g, Rfl: 1    ALBUTEROL INHL, Inhale 1 puff into the lungs as needed., Disp: , Rfl:     azelastine (ASTELIN) 137 mcg (0.1 %) nasal spray, 1 spray (137 mcg total) by Nasal route once daily., Disp: 30 mL, Rfl: 5    cholecalciferol, vitamin D3, 125 mcg (5,000 unit) Tab, Take 1 tablet by mouth every evening., Disp: , Rfl:     coenzyme Q10 100 mg capsule, Place 100 mg under the tongue once daily., Disp: , Rfl:     EPINEPHrine (EPIPEN) 0.3 mg/0.3 mL AtIn, Inject 0.3 mLs into the muscle as needed., Disp: , Rfl:     FLUoxetine 20 MG capsule, Take 20 mg by mouth once daily. , Disp: , Rfl: 2    fluticasone propionate (FLONASE) 50 mcg/actuation nasal spray, 1 spray (50 mcg total) by Each Nostril route daily as needed for Rhinitis or Allergies., Disp: 9.9 mL, Rfl: 5    glucosamine/chondr chatterjee A sod (OSTEO BI-FLEX ORAL), Take by mouth., Disp: , Rfl:     ipratropium (ATROVENT) 0.03 % nasal spray, 2 sprays by Nasal route daily as needed. , Disp: , Rfl:     isosorbide mononitrate (IMDUR) 30 MG 24 hr tablet, Take 2 tablets (60 mg total) by mouth once daily., Disp: 90 tablet,  Rfl: 3    KRILL OIL ORAL, Take by mouth., Disp: , Rfl:     Lactobacillus rhamnosus GG (CULTURELLE) 10 billion cell capsule, Take 1 capsule by mouth once daily., Disp: , Rfl:     levomefolate calcium (ELFOLATE) 15 mg Tab, Take 15 mg by mouth once daily., Disp: , Rfl:     LIDOcaine (LIDODERM) 5 %, Place 1 patch onto the skin once daily. Remove & Discard patch within 12 hours or as directed by MD, Disp: 5 patch, Rfl: 0    loratadine (CLARITIN) 10 mg tablet, Take 10 mg by mouth once daily., Disp: , Rfl:     losartan-hydrochlorothiazide 100-25 mg (HYZAAR) 100-25 mg per tablet, Take 1 tablet by mouth once daily., Disp: 90 tablet, Rfl: 3    magnesium 30 mg tablet, Take 30 mg by mouth once daily., Disp: , Rfl:     MAGNESIUM L-THREONATE ORAL, Take 144 mg by mouth., Disp: , Rfl:     mecobalamin, vitamin B12, 1,000 mcg Chew, Take 1,000 mcg by mouth once daily., Disp: , Rfl:     metroNIDAZOLE (FLAGYL) 500 MG tablet, Take 500 mg by mouth as needed., Disp: , Rfl:     mupirocin (BACTROBAN) 2 % ointment, Apply 2 Tubes topically as needed., Disp: , Rfl:     mv-mn/folic acid/vit [id number] (ALIVE ONCE DAILY WOMEN 50 PLUS ORAL), Take 1 tablet by mouth once daily., Disp: , Rfl:     naproxen (NAPROSYN) 500 MG tablet, Take 1 tablet by mouth as needed., Disp: , Rfl:     om 3/E/linol/ala/oleic/gla/lip (OMEGA 3-6-9 ORAL), Take by mouth., Disp: , Rfl:     potassium chloride (MICRO-K) 10 MEQ CpSR, TAKE 1 CAPSULE BY MOUTH TWICE A DAY, Disp: 180 capsule, Rfl: 3    terazosin (HYTRIN) 2 MG capsule, Take 1 capsule (2 mg total) by mouth every evening., Disp: 90 capsule, Rfl: 3    terazosin (HYTRIN) 5 MG capsule, Take 1 capsule (5 mg total) by mouth every evening., Disp: 90 capsule, Rfl: 3    TIROSINT 88 mcg Cap, Take 88 mcg by mouth once daily. , Disp: , Rfl:     turmeric, bulk, 100 % Powd, Take 1 capsule by mouth once daily., Disp: , Rfl:     vitamin E 400 UNIT capsule, Take 1 capsule by mouth once daily., Disp: , Rfl:     REVIEW OF  "SYSTEMS:  General: No fevers or chills; ENT: No sore throat; Allergy and Immunology: no persistent infections; Hematological and Lymphatic: No history of bleeding or easy bruising; Endocrine: negative; Respiratory: no cough, shortness of breath, or wheezing; Cardiovascular: no chest pain or dyspnea on exertion; Gastrointestinal: no abdominal pain/back, change in bowel habits, or bloody stools; Genito-Urinary: no dysuria, trouble voiding, or hematuria; Musculoskeletal: negative; Neurological: no TIA or stroke symptoms; Psychiatric: no nervousness, anxiety or depression.    PHYSICAL EXAM:      Pulse: 80         General appearance:  Alert, well-appearing, and in no distress.  Oriented to person, place, and time                    Neurological: Normal speech, no focal findings noted; CN II - XII grossly intact. RLE with  sensation to light touch, LLE with  sensation to light touch.            Musculoskeletal: Digits/nail without cyanosis/clubbing.  Strength 5/5.                    Neck: Supple, no significant adenopathy                  Chest:  Clear to auscultation, no wheezes, rales or rhonchi, symmetric air entry. No use of accessory muscles               Cardiac: Normal rate and regular rhythm, S1 and S2 normal            Abdomen: Soft, nontender, nondistended, no masses or organomegaly, no hernia     No rebound tenderness noted; bowel sounds normal      Extremities:   2+ R femoral pulse, 2+ L femoral pulse     2+ R popliteal pulse, 2+ L popliteal pulse     2+ R PT pulse, 2+ L PT pulse     2+ R DP pulse, 2+ L DP pulse     1+ RLE edema, 1+ LLE edema    Skin: RLE +varicose/spider veins; LLE +varicose/spider veins    LAB RESULTS:  No results found for: "CBC"  Lab Results   Component Value Date    INR 1.2 09/20/2021     Lab Results   Component Value Date     07/02/2024    K 3.8 07/02/2024     07/02/2024    CO2 27 07/02/2024     07/02/2024    BUN 13 07/02/2024    CREATININE 0.9 07/02/2024    CALCIUM " 10.1 07/02/2024    ANIONGAP 8 07/02/2024    EGFRNONAA >60 12/30/2021     Lab Results   Component Value Date    WBC 5.70 07/02/2024    RBC 3.80 (L) 07/02/2024    HGB 11.8 (L) 07/02/2024    HCT 37.6 07/02/2024    MCV 99 (H) 07/02/2024    MCH 31.1 (H) 07/02/2024    MCHC 31.4 (L) 07/02/2024    RDW 13.5 07/02/2024     07/02/2024    MPV 9.8 07/02/2024    GRAN 2.7 07/02/2024    GRAN 46.6 07/02/2024    LYMPH 2.2 07/02/2024    LYMPH 37.7 07/02/2024    MONO 0.6 07/02/2024    MONO 10.9 07/02/2024    EOS 0.2 07/02/2024    BASO 0.05 07/02/2024    EOSINOPHIL 3.9 07/02/2024    BASOPHIL 0.9 07/02/2024    DIFFMETHOD Automated 07/02/2024     .  Lab Results   Component Value Date    HGBA1C 5.5 07/02/2024       IMAGING:  All pertinent imaging has been reviewed and interpreted independently.  Venous Insuff US: PENDING    IMP/PLAN:  78 y.o. female with   Patient Active Problem List   Diagnosis    Gallstones    Hypothyroidism    Hypercholesteremia    Osteopenia    Obstructive sleep apnea on CPAP    Vitamin D deficiency disease    Fibromyalgia    Rectal polyp    AR (allergic rhinitis)    GERD (gastroesophageal reflux disease)    Vulvovaginal melanosis    Hx of colonic polyps    Chest pain    Anxiety    Essential hypertension    Statin intolerance    Respiratory tract infection due to COVID-19 virus    Atypical chest pain    Cervical neuropathy    Thyroid dysfunction    Chronic low back pain with sciatica    Dyspnea on exertion    Hyperglycemia    Abnormal electrocardiogram (ECG) (EKG)    Nonrheumatic aortic valve stenosis    Bilateral carotid bruits    Vitreous degeneration of both eyes    Pseudophakia of both eyes    Dry eye syndrome of both eyes    Cervical spondylosis    DDD (degenerative disc disease), cervical    Lumbar spondylosis    DDD (degenerative disc disease), lumbar    being managed by PCP and specialists who is here today for evaluation of BLE edema, pain and varicose / spider veins.    -recommend compression with Rx  stockings, elevation, dietary changes associated with water and sodium intake discussed at length with patient  -Exercise   -Recommend warm compresses, NSAID and elevation for thrombophlebitis prn  -Venous insuff US, MADHAV, and Arterial US  - Referral for Pulmonology for continued SOB s/p COVID. Pt instructed to go to ED or call EMS if symptoms worsen or do not improve.  -RTC 3-4 weeks for further evaluation    I spent 30 minutes evaluating this patient and greater than 50% of the time was spent counseling, coordinator care and discussing the plan of care.  All questions were answered and patient stated understanding with agreement with the above treatment plan.    Chris Patiño NP  Vascular and Endovascular Surgery

## 2024-07-25 ENCOUNTER — OFFICE VISIT (OUTPATIENT)
Dept: ORTHOPEDICS | Facility: CLINIC | Age: 79
End: 2024-07-25
Attending: ORTHOPAEDIC SURGERY
Payer: MEDICARE

## 2024-07-25 ENCOUNTER — HOSPITAL ENCOUNTER (OUTPATIENT)
Dept: CARDIOLOGY | Facility: HOSPITAL | Age: 79
Discharge: HOME | End: 2024-07-25
Attending: NURSE PRACTITIONER
Payer: MEDICARE

## 2024-07-25 ENCOUNTER — HOSPITAL ENCOUNTER (OUTPATIENT)
Dept: RADIOLOGY | Facility: HOSPITAL | Age: 79
Discharge: HOME | End: 2024-07-25
Attending: NURSE PRACTITIONER
Payer: MEDICARE

## 2024-07-25 VITALS — HEIGHT: 60 IN | BODY MASS INDEX: 36.58 KG/M2 | WEIGHT: 186.31 LBS

## 2024-07-25 DIAGNOSIS — M79.89 LEG SWELLING: ICD-10-CM

## 2024-07-25 DIAGNOSIS — M17.12 PRIMARY OSTEOARTHRITIS OF LEFT KNEE: ICD-10-CM

## 2024-07-25 DIAGNOSIS — M54.16 LUMBAR RADICULOPATHY: Primary | ICD-10-CM

## 2024-07-25 DIAGNOSIS — I87.2 VENOUS INSUFFICIENCY OF BOTH LOWER EXTREMITIES: ICD-10-CM

## 2024-07-25 LAB
LEFT ABI: 1.13
LEFT ANT TIBIAL SYS PSV: 104 CM/S
LEFT ARM BP: 144 MMHG
LEFT CFA PSV: 250 CM/S
LEFT DORSALIS PEDIS: 151 MMHG
LEFT EXTERNAL ILIAC PSV: 294 CM/S
LEFT PERONEAL SYS PSV: 69 CM/S
LEFT POPLITEAL PSV: 89 CM/S
LEFT POST TIBIAL SYS PSV: 106 CM/S
LEFT POSTERIOR TIBIAL: 163 MMHG
LEFT PROFUNDA SYS PSV: 172 CM/S
LEFT SUPER FEMORAL DIST SYS PSV: 162 CM/S
LEFT SUPER FEMORAL MID SYS PSV: 156 CM/S
LEFT SUPER FEMORAL OSTIAL SYS PSV: 214 CM/S
LEFT SUPER FEMORAL PROX SYS PSV: 156 CM/S
LEFT TBI: 0.94
LEFT TIB/PER TRUNK SYS PSV: 128 CM/S
LEFT TOE PRESSURE: 135 MMHG
OHS CV LEFT LOWER EXTREMITY ABI (NO CALC): 1.13
OHS CV RIGHT ABI LOWER EXTREMITY (NO CALC): 1.06
RIGHT ABI: 1.06
RIGHT ANT TIBIAL SYS PSV: 80 CM/S
RIGHT ARM BP: 136 MMHG
RIGHT CFA PSV: 232 CM/S
RIGHT DORSALIS PEDIS: 148 MMHG
RIGHT EXTERNAL ILLIAC PSV: 336 CM/S
RIGHT PERONEAL SYS PSV: 87 CM/S
RIGHT POPLITEAL PSV: 89 CM/S
RIGHT POST TIBIAL SYS PSV: 79 CM/S
RIGHT POSTERIOR TIBIAL: 152 MMHG
RIGHT PROFUNDA SYS PSV: 75 CM/S
RIGHT SUPER FEMORAL DIST SYS PSV: 100 CM/S
RIGHT SUPER FEMORAL MID SYS PSV: 172 CM/S
RIGHT SUPER FEMORAL OSTIAL SYS PSV: 161 CM/S
RIGHT SUPER FEMORAL PROX SYS PSV: 199 CM/S
RIGHT TBI: 0.94
RIGHT TIB/PER TRUNK SYS PSV: 144 CM/S
RIGHT TOE PRESSURE: 135 MMHG

## 2024-07-25 PROCEDURE — 93970 EXTREMITY STUDY: CPT | Mod: TC

## 2024-07-25 PROCEDURE — 99214 OFFICE O/P EST MOD 30 MIN: CPT | Mod: PBBFAC,25,PN | Performed by: ORTHOPAEDIC SURGERY

## 2024-07-25 PROCEDURE — 99999PBSHW PR PBB SHADOW TECHNICAL ONLY FILED TO HB: Mod: PBBFAC,,,

## 2024-07-25 PROCEDURE — 20610 DRAIN/INJ JOINT/BURSA W/O US: CPT | Mod: PBBFAC,PN | Performed by: ORTHOPAEDIC SURGERY

## 2024-07-25 PROCEDURE — 99999 PR PBB SHADOW E&M-EST. PATIENT-LVL IV: CPT | Mod: PBBFAC,,, | Performed by: ORTHOPAEDIC SURGERY

## 2024-07-25 PROCEDURE — 93922 UPR/L XTREMITY ART 2 LEVELS: CPT

## 2024-07-25 PROCEDURE — 93925 LOWER EXTREMITY STUDY: CPT

## 2024-07-25 RX ORDER — TRIAMCINOLONE ACETONIDE 40 MG/ML
40 INJECTION, SUSPENSION INTRA-ARTICULAR; INTRAMUSCULAR
Status: DISCONTINUED | OUTPATIENT
Start: 2024-07-25 | End: 2024-07-25 | Stop reason: HOSPADM

## 2024-07-25 RX ADMIN — TRIAMCINOLONE ACETONIDE 40 MG: 40 INJECTION, SUSPENSION INTRA-ARTICULAR; INTRAMUSCULAR at 02:07

## 2024-07-25 NOTE — PROGRESS NOTES
EST PATIENT ORTHOPAEDIC: HIP    PRIMARY CARE PHYSICIAN: Gato Spain MD   REFERRING PROVIDER: Kapil Granados MD  [address] [facility]  [place] [address]     ASSESSMENT & PLAN:    Impression:  Lumbar Radiculopathy  DDD Lumbar Spine    Left Knee Moderate Osteoarthritis    Follow Up Plan: PRN    Non operative care:    Iram Aguirre has physical exam evidence of above and wishes to pursue an non-operative care. I am recommending the following: continued follow up with pain management, left knee steroid injection    To review:  Patient comes in with a long history of having bilateral lower back and hip pain but worse on the left.  She reports a posterior and lateral based hip pain that can extend when she is lying down at night to her knee but not past it.  She also has some lateral compartment arthritis on the left knee that is being treated with viscosupplementation injections, last of which was 2 months ago with another ortho provider.  She did not have significant relief after that last injection but these did work for her previously.  She has never tried steroid injections.  She associates this with being told in the past that steroids caused increased shortness of breath.  I believe this to be oral based steroids and not the knee injections based on further discussion.  I think she should be able to tolerate knee injections and she is not a diabetic.  I do not have any other contraindications that I can determine why she would not benefit from this treatment modality.  She is in agreement with this approach.  We will do a left knee steroid injection today.  If greater than 3 months of relief can continue those injections going forward assuming no systemic side effects.  If this fails to provide relief then really she is left with surgical considerations for that knee.    With regard to her back and leg pain.  She is actively being worked up by vascular to rule out blood clots.  This sounds more neuropathic  based and relative to the radiographs that I have of her lumbar spine I think it is more radiculopathy.  Going to have her see Dr. Sosa again for further assessment of this pain.  Her repeat hip radiographs look good today.  She does not have any pain with internal or external rotation of her hip.     The patient has been ordered:  Left knee steroid injection    CONSULTS:   Pain Management     ACTIVE PROBLEM LIST  Patient Active Problem List   Diagnosis    Gallstones    Hypothyroidism    Hypercholesteremia    Osteopenia    Obstructive sleep apnea on CPAP    Vitamin D deficiency disease    Fibromyalgia    Rectal polyp    AR (allergic rhinitis)    GERD (gastroesophageal reflux disease)    Vulvovaginal melanosis    Hx of colonic polyps    Chest pain    Anxiety    Essential hypertension    Statin intolerance    Respiratory tract infection due to COVID-19 virus    Atypical chest pain    Cervical neuropathy    Thyroid dysfunction    Chronic low back pain with sciatica    Dyspnea on exertion    Hyperglycemia    Abnormal electrocardiogram (ECG) (EKG)    Nonrheumatic aortic valve stenosis    Bilateral carotid bruits    Vitreous degeneration of both eyes    Pseudophakia of both eyes    Dry eye syndrome of both eyes    Cervical spondylosis    DDD (degenerative disc disease), cervical    Lumbar spondylosis    DDD (degenerative disc disease), lumbar           SUBJECTIVE    CHIEF COMPLAINT: Hip Pain    HPI:   Iram Aguirre is a 78 y.o. female here for evaluation and management of left hip pain. There is not a specific incident that brought about this pain. she has had progressive problems with the hip(s) starting 1 years ago and is progressing which is now interfering with activities which include: walking and functional household ADL's    Currently the pain in the joint is moderate with activity.  The pain is intermittent and is located in buttocks and lateral hip.  The pain is described as aching, radiating, and shooting  . Relieving factors include rest, over the counter medication , and repositioning. No associated Catching, Clicking, and Popping.     They do not report leg length inequality.     Iram Aguirre has no additional complaints.     7/25/24: Here for follow up of ongoing bilateral leg pains and left knee pain.     PROGRESSIVE SYMPTOMS:  Pain impacting sleep  Pain worsened by weight bearing  Pain effecting living situation    FUNCTIONAL STATUS:   Climb a flight of stairs or walk up a hill     PREVIOUS TREATMENTS:  Medical: OTC NSAIDS and Tylenol  Physical Therapy: Activities Modified   Previous Orthopaedic Surgery: None    REVIEW OF SYSTEMS:  PAIN ASSESSMENT:  See HPI.  MUSCULOSKELETAL: See HPI.  OTHER 10 point review of systems is negative except as stated in HPI above    PAST MEDICAL HISTORY   has a past medical history of AR (allergic rhinitis) (11/29/2012), Aspergillosis, Benign hypertension, Depression, Diverticulosis (2005), Esophageal polyp, Fibromyalgia, Gastric ulcer; benign (2007), GERD (gastroesophageal reflux disease) (11/29/2012), Hypercholesteremia, Hypothyroidism, Kidney stones, MTHFR gene mutation, Obstructive sleep apnea on CPAP, Osteopenia, Rectal polyp, Skin cancer, and Vulvovaginal melanosis.     PAST SURGICAL HISTORY   has a past surgical history that includes Tear duct surgery (2006); Nasal septum surgery; Tubal ligation (age 26); Ganglion cyst excision (1980); Foot fracture surgery (2005); Knee arthroscopy w/ ACL reconstruction; Cholecystectomy (08/08/2012); Total vaginal hysterectomy (age 31); Hysterectomy; Cholecystectomy; and Colonoscopy (N/A, 02/06/2017).     FAMILY HISTORY  family history includes Bladder Cancer in her brother; Heart disease in her father; Kidney disease in her mother; Leukemia in her brother; Lymphoma in her sister.     SOCIAL HISTORY   reports that she has never smoked. She has never used smokeless tobacco. She reports that she does not drink alcohol and does not use  drugs.     ALLERGIES   Review of patient's allergies indicates:   Allergen Reactions    Cortisone Shortness Of Breath     Causes heart to race    Hair formula [mv,iron,min-folic acid-biotin]      dye    Levaquin [levofloxacin] Other (See Comments)     Tongue swelling and blisters    Codeine Nausea Only    Doxycycline      Other reaction(s): Unknown        MEDICATIONS   Current Outpatient Medications on File Prior to Visit   Medication Sig Dispense Refill    acetaminophen (TYLENOL) 500 MG tablet Take 2 tablets (1,000 mg total) by mouth every 8 (eight) hours as needed. 40 tablet 0    albuterol (PROVENTIL/VENTOLIN HFA) 90 mcg/actuation inhaler Inhale 2 puffs into the lungs every 4 (four) hours as needed for Wheezing. Rescue 8 g 1    ALBUTEROL INHL Inhale 1 puff into the lungs as needed.      azelastine (ASTELIN) 137 mcg (0.1 %) nasal spray 1 spray (137 mcg total) by Nasal route once daily. 30 mL 5    cholecalciferol, vitamin D3, 125 mcg (5,000 unit) Tab Take 1 tablet by mouth every evening.      coenzyme Q10 100 mg capsule Place 100 mg under the tongue once daily.      EPINEPHrine (EPIPEN) 0.3 mg/0.3 mL AtIn Inject 0.3 mLs into the muscle as needed.      FLUoxetine 20 MG capsule Take 20 mg by mouth once daily.   2    fluticasone propionate (FLONASE) 50 mcg/actuation nasal spray 1 spray (50 mcg total) by Each Nostril route daily as needed for Rhinitis or Allergies. 9.9 mL 5    glucosamine/chondr chatterjee A sod (OSTEO BI-FLEX ORAL) Take by mouth.      ipratropium (ATROVENT) 0.03 % nasal spray 2 sprays by Nasal route daily as needed.       isosorbide mononitrate (IMDUR) 30 MG 24 hr tablet Take 2 tablets (60 mg total) by mouth once daily. 90 tablet 3    KRILL OIL ORAL Take by mouth.      Lactobacillus rhamnosus GG (CULTURELLE) 10 billion cell capsule Take 1 capsule by mouth once daily.      levomefolate calcium (ELFOLATE) 15 mg Tab Take 15 mg by mouth once daily.      LIDOcaine (LIDODERM) 5 % Place 1 patch onto the skin once  daily. Remove & Discard patch within 12 hours or as directed by MD 5 patch 0    loratadine (CLARITIN) 10 mg tablet Take 10 mg by mouth once daily.      losartan-hydrochlorothiazide 100-25 mg (HYZAAR) 100-25 mg per tablet Take 1 tablet by mouth once daily. 90 tablet 3    magnesium 30 mg tablet Take 30 mg by mouth once daily.      MAGNESIUM L-THREONATE ORAL Take 144 mg by mouth.      mecobalamin, vitamin B12, 1,000 mcg Chew Take 1,000 mcg by mouth once daily.      metroNIDAZOLE (FLAGYL) 500 MG tablet Take 500 mg by mouth as needed.      mupirocin (BACTROBAN) 2 % ointment Apply 2 Tubes topically as needed.      mv-mn/folic acid/vit [id number] (ALIVE ONCE DAILY WOMEN 50 PLUS ORAL) Take 1 tablet by mouth once daily.      naproxen (NAPROSYN) 500 MG tablet Take 1 tablet by mouth as needed.      om 3/E/linol/ala/oleic/gla/lip (OMEGA 3-6-9 ORAL) Take by mouth.      potassium chloride (MICRO-K) 10 MEQ CpSR TAKE 1 CAPSULE BY MOUTH TWICE A  capsule 3    terazosin (HYTRIN) 2 MG capsule Take 1 capsule (2 mg total) by mouth every evening. 90 capsule 3    terazosin (HYTRIN) 5 MG capsule Take 1 capsule (5 mg total) by mouth every evening. 90 capsule 3    TIROSINT 88 mcg Cap Take 88 mcg by mouth once daily.       turmeric, bulk, 100 % Powd Take 1 capsule by mouth once daily.      vitamin E 400 UNIT capsule Take 1 capsule by mouth once daily.       No current facility-administered medications on file prior to visit.          PHYSICAL EXAM   vitals were not taken for this visit.   There is no height or weight on file to calculate BMI.      All other systems deferred.  GENERAL:  No acute distress  HABITUS: Obese  GAIT: Non-antalgic  SKIN: Normal     HIP EXAM:    left:   ROM:   Flexion: 120 degrees    Internal Rotation: 30 degrees    External Rotation: 30 degrees    Abduction: 45 degrees    Adduction: 20 degrees  No apparent leg length discrepancy    Palpation: Yes tenderness over Greater trochanter   Pain is not reproduced with IR  or ER of the hip  Strength: 5/5 hip flexion, abduction, knee flexion and extension   Straight leg raise: Negative   Neurovascular Status: Sensation intact to light touch in Sural, saphenous, SPN, DPN, Tibial nerve distribution  2+ pulse DP/PT, normal capillary refill, foot has normal warmth    Left knee exam:  He has motion 0-120, tenderness along her lateral joint line, positive Molina's test, pain with deep knee flexion.  No crepitus, no pain with manipulation of her patella.  No evidence of varus valgus instability.  No AP instability.  Negative Molina's test.    DATA:  Diagnostic tests reviewed for today's visit:     AP pelvis, hip, and lateral radiographs shows minimal narrowing of the superior joint space with sclerosis. The femoral neck is in netural position. The femoral head is spherical at superior margin. There is no signficant evidence of acetabular dysplasia and the femoral head remains covered.  DDD of lumbar spine observed.     Left knee radiographs show moderate tricompartmental joint space narrowing and marginal osteophytosis most notably about the lateral tibiofemoral joint space. Chondrocalcinosis.

## 2024-07-25 NOTE — PROCEDURES
Large Joint Aspiration/Injection: L knee    Date/Time: 7/25/2024 2:20 PM    Performed by: Kapil Granados MD  Authorized by: Kapil Granados MD    Consent Done?:  Yes (Verbal)  Indications:  Arthritis and pain  Prep: patient was prepped and draped in usual sterile fashion      Local anesthesia used?: Yes    Anesthesia:  Local infiltration  Local anesthetic:  Topical anesthetic and lidocaine 1% without epinephrine    Details:  Needle Size:  22 G  Approach:  Anterolateral  Location:  Knee  Site:  L knee  Medications:  40 mg triamcinolone acetonide 40 mg/mL  Patient tolerance:  Patient tolerated the procedure well with no immediate complications

## 2024-07-26 ENCOUNTER — TELEPHONE (OUTPATIENT)
Dept: FAMILY MEDICINE | Facility: CLINIC | Age: 79
End: 2024-07-26
Payer: MEDICARE

## 2024-07-26 NOTE — TELEPHONE ENCOUNTER
Pt informed of notifications:The first step in controlling cholesterol is lifestyle management:  - Try to exercise 30 minutes per day, up to 5 times per week.  - Try to follow a Mediterranean style diet rich in fruits vegetables, whole grains and fiber, and lean protein while avoiding red meat along with avoiding processed, packaged or fried foods..  She has labs scheduled and her follow up visit also.----- Message from Gato Spain MD sent at 7/9/2024  3:55 PM CDT -----  Please call to schedule nonfasting blood work in one month.    Also notify of results:  LDL cholesterol is slightly high but improved from last year.  The first step in controlling cholesterol is lifestyle management:  - Try to exercise 30 minutes per day, up to 5 times per week.  - Try to follow a Mediterranean style diet rich in fruits vegetables, whole grains and fiber, and lean protein while avoiding red meat along with avoiding processed, packaged or fried foods.    A very mild anemia was noted.  I suggest we repeat in upcoming month to recheck it.  Notify us if you are experience any abnormal bleeding symptoms.    Your other results look fine and do not require any change in treatment.

## 2024-07-31 ENCOUNTER — TELEPHONE (OUTPATIENT)
Dept: FAMILY MEDICINE | Facility: CLINIC | Age: 79
End: 2024-07-31
Payer: MEDICARE

## 2024-07-31 NOTE — TELEPHONE ENCOUNTER
Patient states she started aloe vera x 2 days and has been having frequency urination without pain. Patient an  urine test added to her labs 08/02/2024.

## 2024-07-31 NOTE — TELEPHONE ENCOUNTER
----- Message from Nadia Dela Cruz sent at 7/31/2024  3:04 PM CDT -----  Regarding: Request for Lab Appointment  Type: Lab    Caller is requesting to schedule their Lab appointment prior to annual appointment.    Order is not listed in EPIC.  Please enter order and contact patient to schedule.    Name of Caller: Self     Preferred Date and Time of Labs: 8/2/24 is already going to get blood work and asked for an order to be placed for urine as well because she is urinating a lot     Date of EPP Appointment: 8/15/24    [place] [facility] [place] the patient rather a call back or a response via [facility]? Call     Best Call Back Number: .[phone]

## 2024-08-01 ENCOUNTER — LAB VISIT (OUTPATIENT)
Dept: LAB | Facility: HOSPITAL | Age: 79
End: 2024-08-01
Attending: INTERNAL MEDICINE
Payer: MEDICARE

## 2024-08-01 DIAGNOSIS — R30.0 DYSURIA: ICD-10-CM

## 2024-08-01 DIAGNOSIS — N30.00 ACUTE CYSTITIS WITHOUT HEMATURIA: ICD-10-CM

## 2024-08-01 DIAGNOSIS — R30.0 DYSURIA: Primary | ICD-10-CM

## 2024-08-01 LAB
BILIRUB UR QL STRIP: NEGATIVE
CLARITY UR: CLEAR
COLOR UR: YELLOW
GLUCOSE UR QL STRIP: NEGATIVE
HGB UR QL STRIP: NEGATIVE
KETONES UR QL STRIP: NEGATIVE
LEUKOCYTE ESTERASE UR QL STRIP: NEGATIVE
MICROSCOPIC COMMENT: NORMAL
NITRITE UR QL STRIP: NEGATIVE
PH UR STRIP: 6 [PH] (ref 5–8)
PROT UR QL STRIP: NEGATIVE
RBC #/AREA URNS HPF: 1 /HPF (ref 0–4)
SP GR UR STRIP: 1.01 (ref 1–1.03)
URN SPEC COLLECT METH UR: NORMAL
UROBILINOGEN UR STRIP-ACNC: NEGATIVE EU/DL

## 2024-08-01 PROCEDURE — 87086 URINE CULTURE/COLONY COUNT: CPT | Performed by: INTERNAL MEDICINE

## 2024-08-01 PROCEDURE — 81000 URINALYSIS NONAUTO W/SCOPE: CPT | Performed by: INTERNAL MEDICINE

## 2024-08-01 RX ORDER — SULFAMETHOXAZOLE AND TRIMETHOPRIM 800; 160 MG/1; MG/1
1 TABLET ORAL 2 TIMES DAILY
Qty: 6 TABLET | Refills: 0 | Status: SHIPPED | OUTPATIENT
Start: 2024-08-01 | End: 2024-08-04

## 2024-08-01 NOTE — TELEPHONE ENCOUNTER
Notified patient regarding Dr. Spian's message:    Please call patient to let her know that she can drop off urine specimen at any [facility] lab today.  After urine is submitted, if still having symptoms of UTI Rx for Bactrim antibiotic is available for her to take (AFTER urine is sent in).     She verbalized understanding.

## 2024-08-03 LAB — BACTERIA UR CULT: NORMAL

## 2024-08-12 ENCOUNTER — OFFICE VISIT (OUTPATIENT)
Dept: VASCULAR SURGERY | Facility: CLINIC | Age: 79
End: 2024-08-12
Payer: MEDICARE

## 2024-08-12 VITALS
BODY MASS INDEX: 36.42 KG/M2 | DIASTOLIC BLOOD PRESSURE: 60 MMHG | HEIGHT: 60 IN | WEIGHT: 185.5 LBS | SYSTOLIC BLOOD PRESSURE: 154 MMHG | HEART RATE: 67 BPM

## 2024-08-12 DIAGNOSIS — M79.89 LEG SWELLING: ICD-10-CM

## 2024-08-12 DIAGNOSIS — M79.606 PAIN OF LOWER EXTREMITY, UNSPECIFIED LATERALITY: Primary | ICD-10-CM

## 2024-08-12 PROCEDURE — 99214 OFFICE O/P EST MOD 30 MIN: CPT | Mod: PBBFAC | Performed by: NURSE PRACTITIONER

## 2024-08-12 PROCEDURE — 99215 OFFICE O/P EST HI 40 MIN: CPT | Mod: S$PBB,,, | Performed by: NURSE PRACTITIONER

## 2024-08-12 PROCEDURE — 99999 PR PBB SHADOW E&M-EST. PATIENT-LVL IV: CPT | Mod: PBBFAC,,, | Performed by: NURSE PRACTITIONER

## 2024-08-12 NOTE — PROGRESS NOTES
Chris Patiño NP         [facility]               08/12/2024    HPI:  Iram Aguirre is a 78 y.o. female with   Patient Active Problem List   Diagnosis    Gallstones    Hypothyroidism    Hypercholesteremia    Osteopenia    Obstructive sleep apnea on CPAP    Vitamin D deficiency disease    Fibromyalgia    Rectal polyp    AR (allergic rhinitis)    GERD (gastroesophageal reflux disease)    Vulvovaginal melanosis    Hx of colonic polyps    Chest pain    Anxiety    Essential hypertension    Statin intolerance    Respiratory tract infection due to COVID-19 virus    Atypical chest pain    Cervical neuropathy    Thyroid dysfunction    Chronic low back pain with sciatica    Dyspnea on exertion    Hyperglycemia    Abnormal electrocardiogram (ECG) (EKG)    Nonrheumatic aortic valve stenosis    Bilateral carotid bruits    Vitreous degeneration of both eyes    Pseudophakia of both eyes    Dry eye syndrome of both eyes    Cervical spondylosis    DDD (degenerative disc disease), cervical    Lumbar spondylosis    DDD (degenerative disc disease), lumbar    being managed by PCP and specialists who is here today for evaluation of  BLE edema, pain and varicose / spider veins.  Patient states location is BLE .  Associated signs and symptoms include varicose and spider veins and swelling.  Quality is achy and severity is 4/10 . Symptoms began 3-4 weeks ago.  Alleviating factors elevation and niacin.  Worsening factors dependency. Symptoms do not limit patient's functional status and daily activities. - DVT history.  - venous interventions.  + low sodium diet.  - excessive water intake. - compression stockings. Pt with complaints of SOB s/p COVID.    No MI  No Stroke  Tobacco use: No    H&P UPDATE  08/12/24: Patient endorses improvement in breathing and walking after receiving steroid injection. Patient without complaints today.    Past Medical History:   Diagnosis Date    AR (allergic rhinitis)  11/29/2012    Aspergillosis     1992    Benign hypertension     Depression     Diverticulosis 2005    Esophageal polyp     Fibromyalgia     Gastric ulcer; benign 2007    GERD (gastroesophageal reflux disease) 11/29/2012    Hypercholesteremia     Hypothyroidism     Kidney stones     MTHFR gene mutation     per daughter E72.8    Obstructive sleep apnea on CPAP     Osteopenia     Rectal polyp     Skin cancer     pre skin cancer skin    Vulvovaginal melanosis      Past Surgical History:   Procedure Laterality Date    CHOLECYSTECTOMY  08/08/2012    CHOLECYSTECTOMY      COLONOSCOPY N/A 02/06/2017    Procedure: COLONOSCOPY;  Surgeon: Arturo Crane MD;  Location: [facility];  Service: Endoscopy;  Laterality: N/A;    FOOT FRACTURE SURGERY  2005    plate in right foot    GANGLION CYST EXCISION  1980    left wrist    HYSTERECTOMY      KNEE ARTHROSCOPY W/ ACL RECONSTRUCTION      bilateral     NASAL SEPTUM SURGERY      TEAR DUCT SURGERY  2006    left eye    TOTAL VAGINAL HYSTERECTOMY  age 31    w/BSO    TUBAL LIGATION  age 26     Family History   Problem Relation Name Age of Onset    Kidney disease Mother      Heart disease Father      Lymphoma Sister      Bladder Cancer Brother      Leukemia Brother       Social History     Socioeconomic History    Marital status:     Number of children: 3   Occupational History    Occupation: teacher, phone  - retired   Tobacco Use    Smoking status: Never    Smokeless tobacco: Never   Substance and Sexual Activity    Alcohol use: No    Drug use: No    Sexual activity: Not Currently     Partners: Male     Birth control/protection: None     Social Determinants of Health     Financial Resource Strain: High Risk (12/4/2023)    Overall Financial Resource Strain (CARDIA)     Difficulty of Paying Living Expenses: Hard   Food Insecurity: Food Insecurity Present (12/4/2023)    Hunger Vital Sign     Worried About Running Out of Food in the Last Year: Sometimes true     Ran Out of  Food in the Last Year: Patient declined   Transportation Needs: No Transportation Needs (12/4/2023)    PRAPARE - Transportation     Lack of Transportation (Medical): No     Lack of Transportation (Non-Medical): No   Physical Activity: Unknown (12/4/2023)    Exercise Vital Sign     Days of Exercise per Week: 0 days   Stress: No Stress Concern Present (12/4/2023)    [employer] - Occupational Stress Questionnaire     Feeling of Stress : Not at all   Housing Stability: Low Risk  (12/4/2023)    Housing Stability Vital Sign     Unable to Pay for Housing in the Last Year: No     Number of Places Lived in the Last Year: 1     Unstable Housing in the Last Year: No       Current Outpatient Medications:     acetaminophen (TYLENOL) 500 MG tablet, Take 2 tablets (1,000 mg total) by mouth every 8 (eight) hours as needed., Disp: 40 tablet, Rfl: 0    albuterol (PROVENTIL/VENTOLIN HFA) 90 mcg/actuation inhaler, Inhale 2 puffs into the lungs every 4 (four) hours as needed for Wheezing. Rescue, Disp: 8 g, Rfl: 1    ALBUTEROL INHL, Inhale 1 puff into the lungs as needed., Disp: , Rfl:     azelastine (ASTELIN) 137 mcg (0.1 %) nasal spray, 1 spray (137 mcg total) by Nasal route once daily., Disp: 30 mL, Rfl: 5    cholecalciferol, vitamin D3, 125 mcg (5,000 unit) Tab, Take 1 tablet by mouth every evening., Disp: , Rfl:     coenzyme Q10 100 mg capsule, Place 100 mg under the tongue once daily., Disp: , Rfl:     EPINEPHrine (EPIPEN) 0.3 mg/0.3 mL AtIn, Inject 0.3 mLs into the muscle as needed., Disp: , Rfl:     FLUoxetine 20 MG capsule, Take 20 mg by mouth once daily. , Disp: , Rfl: 2    fluticasone propionate (FLONASE) 50 mcg/actuation nasal spray, 1 spray (50 mcg total) by Each Nostril route daily as needed for Rhinitis or Allergies., Disp: 9.9 mL, Rfl: 5    glucosamine/chondr chatterjee A sod (OSTEO BI-FLEX ORAL), Take by mouth., Disp: , Rfl:     ipratropium (ATROVENT) 0.03 % nasal spray, 2 sprays by Nasal route  daily as needed. , Disp: , Rfl:     isosorbide mononitrate (IMDUR) 30 MG 24 hr tablet, Take 2 tablets (60 mg total) by mouth once daily., Disp: 90 tablet, Rfl: 3    KRILL OIL ORAL, Take by mouth., Disp: , Rfl:     Lactobacillus rhamnosus GG (CULTURELLE) 10 billion cell capsule, Take 1 capsule by mouth once daily., Disp: , Rfl:     levomefolate calcium (ELFOLATE) 15 mg Tab, Take 15 mg by mouth once daily., Disp: , Rfl:     LIDOcaine (LIDODERM) 5 %, Place 1 patch onto the skin once daily. Remove & Discard patch within 12 hours or as directed by MD, Disp: 5 patch, Rfl: 0    loratadine (CLARITIN) 10 mg tablet, Take 10 mg by mouth once daily., Disp: , Rfl:     losartan-hydrochlorothiazide 100-25 mg (HYZAAR) 100-25 mg per tablet, Take 1 tablet by mouth once daily., Disp: 90 tablet, Rfl: 3    magnesium 30 mg tablet, Take 30 mg by mouth once daily., Disp: , Rfl:     MAGNESIUM L-THREONATE ORAL, Take 144 mg by mouth., Disp: , Rfl:     mecobalamin, vitamin B12, 1,000 mcg Chew, Take 1,000 mcg by mouth once daily., Disp: , Rfl:     metroNIDAZOLE (FLAGYL) 500 MG tablet, Take 500 mg by mouth as needed., Disp: , Rfl:     mupirocin (BACTROBAN) 2 % ointment, Apply 2 Tubes topically as needed., Disp: , Rfl:     mv-mn/folic acid/vit [id number] (ALIVE ONCE DAILY WOMEN 50 PLUS ORAL), Take 1 tablet by mouth once daily., Disp: , Rfl:     naproxen (NAPROSYN) 500 MG tablet, Take 1 tablet by mouth as needed., Disp: , Rfl:     om 3/E/linol/ala/oleic/gla/lip (OMEGA 3-6-9 ORAL), Take by mouth., Disp: , Rfl:     potassium chloride (MICRO-K) 10 MEQ CpSR, TAKE 1 CAPSULE BY MOUTH TWICE A DAY, Disp: 180 capsule, Rfl: 3    terazosin (HYTRIN) 2 MG capsule, Take 1 capsule (2 mg total) by mouth every evening., Disp: 90 capsule, Rfl: 3    terazosin (HYTRIN) 5 MG capsule, Take 1 capsule (5 mg total) by mouth every evening., Disp: 90 capsule, Rfl: 3    TIROSINT 88 mcg Cap, Take 88 mcg by mouth once daily. , Disp: , Rfl:     turmeric, bulk, 100 % Powd, Take 1  "capsule by mouth once daily., Disp: , Rfl:     vitamin E 400 UNIT capsule, Take 1 capsule by mouth once daily., Disp: , Rfl:     REVIEW OF SYSTEMS:  General: No fevers or chills; ENT: No sore throat; Allergy and Immunology: no persistent infections; Hematological and Lymphatic: No history of bleeding or easy bruising; Endocrine: negative; Respiratory: no cough, shortness of breath, or wheezing; Cardiovascular: no chest pain or dyspnea on exertion; Gastrointestinal: no abdominal pain/back, change in bowel habits, or bloody stools; Genito-Urinary: no dysuria, trouble voiding, or hematuria; Musculoskeletal: negative; Neurological: no TIA or stroke symptoms; Psychiatric: no nervousness, anxiety or depression.    PHYSICAL EXAM:      Pulse: 67         General appearance:  Alert, well-appearing, and in no distress.  Oriented to person, place, and time                    Neurological: Normal speech, no focal findings noted; CN II - XII grossly intact. RLE with  sensation to light touch, LLE with  sensation to light touch.            Musculoskeletal: Digits/nail without cyanosis/clubbing.  Strength 5/5.                    Neck: Supple, no significant adenopathy                  Chest:  Clear to auscultation, no wheezes, rales or rhonchi, symmetric air entry. No use of accessory muscles               Cardiac: Normal rate and regular rhythm, S1 and S2 normal            Abdomen: Soft, nontender, nondistended, no masses or organomegaly, no hernia     No rebound tenderness noted; bowel sounds normal      Extremities:   2+ R femoral pulse, 2+ L femoral pulse     2+ R popliteal pulse, 2+ L popliteal pulse     2+ R PT pulse, 2+ L PT pulse     2+ R DP pulse, 2+ L DP pulse     1+ RLE edema, 1+ LLE edema    Skin: RLE +varicose/spider veins; LLE +varicose/spider veins    LAB RESULTS:  No results found for: "CBC"  Lab Results   Component Value Date    INR 1.2 09/20/2021     Lab Results   Component Value Date     07/02/2024    K 3.8 " 07/02/2024     07/02/2024    CO2 27 07/02/2024     07/02/2024    BUN 13 07/02/2024    CREATININE 0.9 07/02/2024    CALCIUM 10.1 07/02/2024    ANIONGAP 8 07/02/2024    EGFRNONAA >60 12/30/2021     Lab Results   Component Value Date    WBC 7.95 08/01/2024    RBC 4.02 08/01/2024    HGB 12.4 08/01/2024    HCT 37.7 08/01/2024    MCV 94 08/01/2024    MCH 30.8 08/01/2024    MCHC 32.9 08/01/2024    RDW 12.9 08/01/2024     08/01/2024    MPV 9.7 08/01/2024    GRAN 4.3 08/01/2024    GRAN 53.5 08/01/2024    LYMPH 2.7 08/01/2024    LYMPH 33.8 08/01/2024    MONO 0.8 08/01/2024    MONO 9.7 08/01/2024    EOS 0.2 08/01/2024    BASO 0.05 08/01/2024    EOSINOPHIL 2.1 08/01/2024    BASOPHIL 0.6 08/01/2024    DIFFMETHOD Automated 08/01/2024     .  Lab Results   Component Value Date    HGBA1C 5.5 07/02/2024       IMAGING:  All pertinent imaging has been reviewed and interpreted independently.  Venous Insuff US:      FINDINGS:  Technique: Sonographic evaluation of the bilateral  lower extremity deep venous systems was performed. Grey scale, color flow and spectral doppler was performed.     With the patient standing and supporting weight on contralateral leg insufficiency within the greater saphenous and lesser saphenous venous system was tested with augmentation via distal manual compression.     Deep venous system:     There is evidence of flow, compressibility and augmentation within the bilateral common femoral, femoral, and popliteal veins.  Flow is seen within the bilateral peroneal  veins.     Superficial venous system:     Right leg:     There is no evidence of reflux lasting longer than 500 ms in either the right greater saphenous or lesser saphenous veins.     The maximal diameter within the right greater saphenous vein is 9 mm.     The maximal diameter within the right lesser saphenous vein is 3 mm.     Left leg:     There is no evidence of reflux lasting longer than 500 ms in either the left greater  saphenous or lesser saphenous veins.     The maximal diameter within the left greater saphenous vein is 7 mm.     The maximal diameter within the left lesser saphenous vein is 4 mm.     Impression:     1. No evidence of hemodynamically significant venous reflux (reflux lasting greater than 500 ms) in the bilateral  greater or lesser saphenous vein.     2. No evidence of bilateral  lower extremity deep venous thrombosis.        IMP/PLAN:  78 y.o. female with   Patient Active Problem List   Diagnosis    Gallstones    Hypothyroidism    Hypercholesteremia    Osteopenia    Obstructive sleep apnea on CPAP    Vitamin D deficiency disease    Fibromyalgia    Rectal polyp    AR (allergic rhinitis)    GERD (gastroesophageal reflux disease)    Vulvovaginal melanosis    Hx of colonic polyps    Chest pain    Anxiety    Essential hypertension    Statin intolerance    Respiratory tract infection due to COVID-19 virus    Atypical chest pain    Cervical neuropathy    Thyroid dysfunction    Chronic low back pain with sciatica    Dyspnea on exertion    Hyperglycemia    Abnormal electrocardiogram (ECG) (EKG)    Nonrheumatic aortic valve stenosis    Bilateral carotid bruits    Vitreous degeneration of both eyes    Pseudophakia of both eyes    Dry eye syndrome of both eyes    Cervical spondylosis    DDD (degenerative disc disease), cervical    Lumbar spondylosis    DDD (degenerative disc disease), lumbar    being managed by PCP and specialists who is here today for evaluation of BLE edema, pain and varicose / spider veins.    -recommend compression with Rx stockings, elevation, dietary changes associated with water and sodium intake discussed at length with patient  -Exercise   -Recommend warm compresses, NSAID and elevation for thrombophlebitis prn  -Venous insuff US, MADHAV, and Arterial US with in normal limits  - Referral for Pulmonology for continued SOB s/p COVID. Pt instructed to go to ED or call EMS if symptoms worsen or do not  improve.  -RTC 1 year for further evaluation    I spent 30 minutes evaluating this patient and greater than 50% of the time was spent counseling, coordinator care and discussing the plan of care.  All questions were answered and patient stated understanding with agreement with the above treatment plan.    Chris Patiño NP  Vascular and Endovascular Surgery

## 2024-09-24 ENCOUNTER — OFFICE VISIT (OUTPATIENT)
Dept: FAMILY MEDICINE | Facility: CLINIC | Age: 79
End: 2024-09-24
Payer: MEDICARE

## 2024-09-24 ENCOUNTER — LAB VISIT (OUTPATIENT)
Dept: LAB | Facility: HOSPITAL | Age: 79
End: 2024-09-24
Attending: INTERNAL MEDICINE
Payer: MEDICARE

## 2024-09-24 VITALS
OXYGEN SATURATION: 96 % | BODY MASS INDEX: 37.01 KG/M2 | SYSTOLIC BLOOD PRESSURE: 138 MMHG | DIASTOLIC BLOOD PRESSURE: 64 MMHG | TEMPERATURE: 98 F | HEIGHT: 60 IN | HEART RATE: 75 BPM | WEIGHT: 188.5 LBS

## 2024-09-24 DIAGNOSIS — R19.7 DIARRHEA, UNSPECIFIED TYPE: ICD-10-CM

## 2024-09-24 DIAGNOSIS — I20.89 STABLE ANGINA: ICD-10-CM

## 2024-09-24 DIAGNOSIS — Z87.19 HISTORY OF DIVERTICULITIS: ICD-10-CM

## 2024-09-24 DIAGNOSIS — I10 ESSENTIAL HYPERTENSION: ICD-10-CM

## 2024-09-24 DIAGNOSIS — R10.32 LEFT LOWER QUADRANT PAIN: Primary | ICD-10-CM

## 2024-09-24 DIAGNOSIS — R10.32 LEFT LOWER QUADRANT PAIN: ICD-10-CM

## 2024-09-24 LAB
ALBUMIN SERPL BCP-MCNC: 3.7 G/DL (ref 3.5–5.2)
ALP SERPL-CCNC: 64 U/L (ref 55–135)
ALT SERPL W/O P-5'-P-CCNC: 22 U/L (ref 10–44)
ANION GAP SERPL CALC-SCNC: 12 MMOL/L (ref 8–16)
AST SERPL-CCNC: 25 U/L (ref 10–40)
BILIRUB SERPL-MCNC: 0.4 MG/DL (ref 0.1–1)
BUN SERPL-MCNC: 15 MG/DL (ref 8–23)
CALCIUM SERPL-MCNC: 9.9 MG/DL (ref 8.7–10.5)
CHLORIDE SERPL-SCNC: 104 MMOL/L (ref 95–110)
CO2 SERPL-SCNC: 24 MMOL/L (ref 23–29)
CREAT SERPL-MCNC: 1 MG/DL (ref 0.5–1.4)
EST. GFR  (NO RACE VARIABLE): 58 ML/MIN/1.73 M^2
GLUCOSE SERPL-MCNC: 101 MG/DL (ref 70–110)
OB PNL STL: NEGATIVE
POTASSIUM SERPL-SCNC: 4.2 MMOL/L (ref 3.5–5.1)
PROT SERPL-MCNC: 7.1 G/DL (ref 6–8.4)
SODIUM SERPL-SCNC: 140 MMOL/L (ref 136–145)

## 2024-09-24 PROCEDURE — 80053 COMPREHEN METABOLIC PANEL: CPT | Performed by: INTERNAL MEDICINE

## 2024-09-24 PROCEDURE — 87449 NOS EACH ORGANISM AG IA: CPT | Performed by: INTERNAL MEDICINE

## 2024-09-24 PROCEDURE — 89055 LEUKOCYTE ASSESSMENT FECAL: CPT | Performed by: INTERNAL MEDICINE

## 2024-09-24 PROCEDURE — 82272 OCCULT BLD FECES 1-3 TESTS: CPT | Performed by: INTERNAL MEDICINE

## 2024-09-24 PROCEDURE — 87045 FECES CULTURE AEROBIC BACT: CPT | Performed by: INTERNAL MEDICINE

## 2024-09-24 PROCEDURE — 87427 SHIGA-LIKE TOXIN AG IA: CPT | Mod: 59 | Performed by: INTERNAL MEDICINE

## 2024-09-24 PROCEDURE — 36415 COLL VENOUS BLD VENIPUNCTURE: CPT | Mod: PN | Performed by: INTERNAL MEDICINE

## 2024-09-24 PROCEDURE — 99999 PR PBB SHADOW E&M-EST. PATIENT-LVL V: CPT | Mod: PBBFAC,,, | Performed by: INTERNAL MEDICINE

## 2024-09-24 PROCEDURE — 87324 CLOSTRIDIUM AG IA: CPT | Performed by: INTERNAL MEDICINE

## 2024-09-24 PROCEDURE — 87046 STOOL CULTR AEROBIC BACT EA: CPT | Mod: 59 | Performed by: INTERNAL MEDICINE

## 2024-09-24 PROCEDURE — 99215 OFFICE O/P EST HI 40 MIN: CPT | Mod: PBBFAC,PN | Performed by: INTERNAL MEDICINE

## 2024-09-24 PROCEDURE — 99214 OFFICE O/P EST MOD 30 MIN: CPT | Mod: S$PBB,,, | Performed by: INTERNAL MEDICINE

## 2024-09-24 RX ORDER — DICYCLOMINE HYDROCHLORIDE 10 MG/1
10 CAPSULE ORAL
Qty: 120 CAPSULE | Refills: 0 | Status: SHIPPED | OUTPATIENT
Start: 2024-09-24 | End: 2024-10-24

## 2024-09-24 NOTE — PROGRESS NOTES
HISTORY OF PRESENT ILLNESS:  Iram Aguirre is a 79 y.o. female who presents to the clinic today for Follow-up, Flank Pain (Left side pain ), Leg Cramps    Last seen by me 7/2024.    Notes personal stress recently.  Notes nocturnal leg cramps.    Left abdominal pain  Noted diverticulosis.  8/10 pain, no N/V, no blood in BM, notes diarrhea recently.    Osteoarthritis  Seen by ortho.  Recent steroid intra articular injection.     Lumbar spondylosis  Seen by pain mgmt.  Referred to PT.     Osteoporosis, hypothyroidism  Seen by endo [place].  Zoledronic acid (Reclast) in 2009 and Ibandronate (Boniva) 150 mg/month. The reason(s) for stopping the medication(s) include development or worsening of GERD and/or esophageal symptoms. The patient's current bone related medication includes Zoledronic acid (Reclast) 5 mg IV infusion.   On Tirosint 88 mcg.     Aortic valve stenosis  Seen by cardiology 12/2023 - [facility].     HTN  Managed with losartan/HCTZ, isosorbide mononitrate, terazosin.     Aspergillosis  Follow with ENT in [place] - Dr. Jeferson Tabro.    PAST MEDICAL HISTORY:  Past Medical History:   Diagnosis Date    AR (allergic rhinitis) 11/29/2012    Aspergillosis     1992    Benign hypertension     Depression     Diverticulosis 2005    Esophageal polyp     Fibromyalgia     Gastric ulcer; benign 2007    GERD (gastroesophageal reflux disease) 11/29/2012    Hypercholesteremia     Hypothyroidism     Kidney stones     MTHFR gene mutation     per daughter E72.8    Obstructive sleep apnea on CPAP     Osteopenia     Rectal polyp     Skin cancer     pre skin cancer skin    Vulvovaginal melanosis        PAST SURGICAL HISTORY:  Past Surgical History:   Procedure Laterality Date    CHOLECYSTECTOMY  08/08/2012    CHOLECYSTECTOMY      COLONOSCOPY N/A 02/06/2017    Procedure: COLONOSCOPY;  Surgeon: Arturo Crane MD;  Location: [facility];  Service: Endoscopy;  Laterality: N/A;    FOOT FRACTURE SURGERY  2005     plate in right foot    GANGLION CYST EXCISION  1980    left wrist    HYSTERECTOMY      KNEE ARTHROSCOPY W/ ACL RECONSTRUCTION      bilateral     NASAL SEPTUM SURGERY      TEAR DUCT SURGERY  2006    left eye    TOTAL VAGINAL HYSTERECTOMY  age 31    w/BSO    TUBAL LIGATION  age 26       SOCIAL HISTORY:  Social History     Socioeconomic History    Marital status:     Number of children: 3   Occupational History    Occupation: teacher, phone  - retired   Tobacco Use    Smoking status: Never    Smokeless tobacco: Never   Substance and Sexual Activity    Alcohol use: No    Drug use: No    Sexual activity: Not Currently     Partners: Male     Birth control/protection: None     Social Determinants of Health     Financial Resource Strain: High Risk (12/4/2023)    Overall Financial Resource Strain (CARDIA)     Difficulty of Paying Living Expenses: Hard   Food Insecurity: Food Insecurity Present (12/4/2023)    Hunger Vital Sign     Worried About Running Out of Food in the Last Year: Sometimes true     Ran Out of Food in the Last Year: Patient declined   Transportation Needs: No Transportation Needs (12/4/2023)    PRAPARE - Transportation     Lack of Transportation (Medical): No     Lack of Transportation (Non-Medical): No   Physical Activity: Unknown (12/4/2023)    Exercise Vital Sign     Days of Exercise per Week: 0 days   Stress: No Stress Concern Present (12/4/2023)    [employer] - Occupational Stress Questionnaire     Feeling of Stress : Not at all   Housing Stability: Low Risk  (12/4/2023)    Housing Stability Vital Sign     Unable to Pay for Housing in the Last Year: No     Number of Places Lived in the Last Year: 1     Unstable Housing in the Last Year: No       FAMILY HISTORY:  Family History   Problem Relation Name Age of Onset    Kidney disease Mother      Heart disease Father      Lymphoma Sister      Bladder Cancer Brother      Leukemia Brother         ALLERGIES AND  MEDICATIONS: updated and reviewed.  Review of patient's allergies indicates:   Allergen Reactions    Cortisone Shortness Of Breath     Causes heart to race    Hair formula [mv,iron,min-folic acid-biotin]      dye    Levaquin [levofloxacin] Other (See Comments)     Tongue swelling and blisters    Codeine Nausea Only    Doxycycline      Other reaction(s): Unknown     Medication List with Changes/Refills   Current Medications    ACETAMINOPHEN (TYLENOL) 500 MG TABLET    Take 2 tablets (1,000 mg total) by mouth every 8 (eight) hours as needed.    ALBUTEROL (PROVENTIL/VENTOLIN HFA) 90 MCG/ACTUATION INHALER    Inhale 2 puffs into the lungs every 4 (four) hours as needed for Wheezing. Rescue    ALBUTEROL INHL    Inhale 1 puff into the lungs as needed.    AZELASTINE (ASTELIN) 137 MCG (0.1 %) NASAL SPRAY    1 spray (137 mcg total) by Nasal route once daily.    CHOLECALCIFEROL, VITAMIN D3, 125 MCG (5,000 UNIT) TAB    Take 1 tablet by mouth every evening.    COENZYME Q10 100 MG CAPSULE    Place 100 mg under the tongue once daily.    EPINEPHRINE (EPIPEN) 0.3 MG/0.3 ML ATIN    Inject 0.3 mLs into the muscle as needed.    FLUOXETINE 20 MG CAPSULE    Take 20 mg by mouth once daily.     FLUTICASONE PROPIONATE (FLONASE) 50 MCG/ACTUATION NASAL SPRAY    1 spray (50 mcg total) by Each Nostril route daily as needed for Rhinitis or Allergies.    GLUCOSAMINE/CHONDR FRASER A SOD (OSTEO BI-FLEX ORAL)    Take by mouth.    IPRATROPIUM (ATROVENT) 0.03 % NASAL SPRAY    2 sprays by Nasal route daily as needed.     ISOSORBIDE MONONITRATE (IMDUR) 30 MG 24 HR TABLET    Take 2 tablets (60 mg total) by mouth once daily.    KRILL OIL ORAL    Take by mouth.    LACTOBACILLUS RHAMNOSUS GG (CULTURELLE) 10 BILLION CELL CAPSULE    Take 1 capsule by mouth once daily.    LEVOMEFOLATE CALCIUM (ELFOLATE) 15 MG TAB    Take 15 mg by mouth once daily.    LIDOCAINE (LIDODERM) 5 %    Place 1 patch onto the skin once daily. Remove & Discard patch within 12 hours or as  directed by MD    LORATADINE (CLARITIN) 10 MG TABLET    Take 10 mg by mouth once daily.    LOSARTAN-HYDROCHLOROTHIAZIDE 100-25 MG (HYZAAR) 100-25 MG PER TABLET    Take 1 tablet by mouth once daily.    MAGNESIUM 30 MG TABLET    Take 30 mg by mouth once daily.    MAGNESIUM L-THREONATE ORAL    Take 144 mg by mouth.    MECOBALAMIN, VITAMIN B12, 1,000 MCG CHEW    Take 1,000 mcg by mouth once daily.    METRONIDAZOLE (FLAGYL) 500 MG TABLET    Take 500 mg by mouth as needed.    MUPIROCIN (BACTROBAN) 2 % OINTMENT    Apply 2 Tubes topically as needed.    MV-MN/FOLIC ACID/VIT [id number] (ALIVE ONCE DAILY WOMEN 50 PLUS ORAL)    Take 1 tablet by mouth once daily.    NAPROXEN (NAPROSYN) 500 MG TABLET    Take 1 tablet by mouth as needed.    OM 3/E/LINOL/ALA/OLEIC/GLA/LIP (OMEGA 3-6-9 ORAL)    Take by mouth.    POTASSIUM CHLORIDE (MICRO-K) 10 MEQ CPSR    TAKE 1 CAPSULE BY MOUTH TWICE A DAY    TERAZOSIN (HYTRIN) 2 MG CAPSULE    Take 1 capsule (2 mg total) by mouth every evening.    TERAZOSIN (HYTRIN) 5 MG CAPSULE    Take 1 capsule (5 mg total) by mouth every evening.    TIROSINT 88 MCG CAP    Take 88 mcg by mouth once daily.     TURMERIC, BULK, 100 % POWD    Take 1 capsule by mouth once daily.    VITAMIN E 400 UNIT CAPSULE    Take 1 capsule by mouth once daily.          CARE TEAM:  Patient Care Team:  Gato Spain MD as PCP - General (Internal Medicine)  Real Patrick MD as Consulting Physician (Cardiology)  Jeferson Bourne MD as Consulting Physician (Gastroenterology)  Bassem Day MD as Consulting Physician (Endocrinology)  Cheri Perez MD (Obstetrics and Gynecology)         REVIEW OF SYSTEMS:  Review of Systems   Constitutional:  Negative for chills and fever.   HENT:  Negative for congestion and postnasal drip.    Eyes:  Negative for photophobia and visual disturbance.   Respiratory:  Negative for cough and shortness of breath.    Cardiovascular:  Negative for chest pain and palpitations.   Gastrointestinal:   Positive for abdominal pain and diarrhea. Negative for blood in stool, constipation, nausea and vomiting.   Genitourinary:  Negative for dysuria and frequency.   Musculoskeletal:  Negative for arthralgias and back pain.   Neurological:  Negative for light-headedness and headaches.   Psychiatric/Behavioral:  Negative for dysphoric mood. The patient is not nervous/anxious.          PHYSICAL EXAM:   Vitals:    09/24/24 0950   BP: 138/64   Pulse: 75   Temp: 98 °F (36.7 °C)             Body mass index is 36.81 kg/m².     General appearance - alert, well appearing, and in no distress and obese  Mental status - normal mood, behavior, speech, dress, motor activity, and thought processes  Eyes - sclera anicteric, left eye normal, right eye normal  Chest - clear to auscultation, no wheezes, rales or rhonchi, symmetric air entry  Heart - normal rate, regular rhythm, normal S1, S2, no murmurs, rubs, clicks or gallops  Abdomen - soft, nondistended, no masses or organomegaly  tenderness noted LLQ  no rebound tenderness noted  bowel sounds normal  Neurological - alert, oriented, normal speech, no focal findings or movement disorder noted  Extremities - peripheral pulses normal, no pedal edema, no clubbing or cyanosis      ASSESSMENT AND PLAN:  Left lower quadrant pain  History of diverticulitis  -     CT Abdomen Pelvis With IV Contrast Routine Oral Contrast; Future; Expected date: 09/24/2024  -     Comprehensive Metabolic Panel; Future; Expected date: 09/24/2024    Diarrhea, unspecified type  -     Stool culture; Future; Expected date: 09/24/2024  -     Occult blood x 1, stool; Future; Expected date: 09/24/2024  -     Stool Exam-Ova,Cysts,Parasites; Future; Expected date: 09/24/2024  -     WBC, Stool; Future; Expected date: 09/24/2024  -     Clostridium difficile EIA; Future; Expected date: 09/24/2024  -     dicyclomine (BENTYL) 10 MG capsule; Take 1 capsule (10 mg total) by mouth 4 (four) times daily before meals and nightly.   Dispense: 120 capsule; Refill: 0    Essential hypertension  Stable angina       - Stable on current medical management.      Follow up 4 months or sooner as needed.

## 2024-09-25 ENCOUNTER — TELEPHONE (OUTPATIENT)
Dept: FAMILY MEDICINE | Facility: CLINIC | Age: 79
End: 2024-09-25
Payer: MEDICARE

## 2024-09-25 LAB
E COLI SXT1 STL QL IA: NEGATIVE
E COLI SXT2 STL QL IA: NEGATIVE
WBC #/AREA STL HPF: NORMAL /[HPF]

## 2024-09-25 NOTE — TELEPHONE ENCOUNTER
Called Patient to inform her of the PA approval for her dicyclomine (BENTYL) 10 MG capsule .  No answer.  Left voice message on an identified recorder.

## 2024-09-26 ENCOUNTER — HOSPITAL ENCOUNTER (OUTPATIENT)
Dept: RADIOLOGY | Facility: HOSPITAL | Age: 79
Discharge: HOME | End: 2024-09-26
Attending: INTERNAL MEDICINE
Payer: MEDICARE

## 2024-09-26 DIAGNOSIS — Z87.19 HISTORY OF DIVERTICULITIS: ICD-10-CM

## 2024-09-26 DIAGNOSIS — R10.32 LEFT LOWER QUADRANT PAIN: ICD-10-CM

## 2024-09-26 PROCEDURE — 74177 CT ABD & PELVIS W/CONTRAST: CPT | Mod: 26,,, | Performed by: RADIOLOGY

## 2024-09-26 PROCEDURE — 74177 CT ABD & PELVIS W/CONTRAST: CPT | Mod: TC

## 2024-09-26 PROCEDURE — 25500020 PHARM REV CODE 255: Performed by: INTERNAL MEDICINE

## 2024-09-26 RX ADMIN — IOHEXOL 85 ML: 350 INJECTION, SOLUTION INTRAVENOUS at 11:09

## 2024-09-26 RX ADMIN — IOHEXOL 15 ML: 300 INJECTION, SOLUTION INTRAVENOUS at 11:09

## 2024-09-27 ENCOUNTER — TELEPHONE (OUTPATIENT)
Dept: FAMILY MEDICINE | Facility: CLINIC | Age: 79
End: 2024-09-27
Payer: MEDICARE

## 2024-09-27 LAB — BACTERIA STL CULT: NORMAL

## 2024-09-27 NOTE — TELEPHONE ENCOUNTER
----- Message from Gato Spain MD sent at 9/26/2024  1:10 PM CDT -----  Please call patient to inform:    The CT did not show DIVERTICULITIS (acute inflammation) but it does show diverticulosis.  Blood work was normal.  Stool culture is negative but parasite test is pending.  At this time it does not appear there is a need for antibiotics.  Continue with light diet as recommended at the visit and return to clinic in upcoming month if not better.

## 2024-10-01 LAB — O+P STL MICRO: NORMAL

## 2024-10-05 ENCOUNTER — OFFICE VISIT (OUTPATIENT)
Dept: URGENT CARE | Facility: CLINIC | Age: 79
End: 2024-10-05
Payer: MEDICARE

## 2024-10-05 VITALS
BODY MASS INDEX: 36.91 KG/M2 | WEIGHT: 188 LBS | DIASTOLIC BLOOD PRESSURE: 93 MMHG | HEIGHT: 60 IN | SYSTOLIC BLOOD PRESSURE: 147 MMHG | HEART RATE: 66 BPM | OXYGEN SATURATION: 97 % | RESPIRATION RATE: 18 BRPM | TEMPERATURE: 99 F

## 2024-10-05 DIAGNOSIS — J01.90 ACUTE BACTERIAL SINUSITIS: Primary | ICD-10-CM

## 2024-10-05 DIAGNOSIS — B96.89 ACUTE BACTERIAL SINUSITIS: Primary | ICD-10-CM

## 2024-10-05 PROCEDURE — 99213 OFFICE O/P EST LOW 20 MIN: CPT | Mod: S$GLB,,,

## 2024-10-05 RX ORDER — AZITHROMYCIN 250 MG/1
TABLET, FILM COATED ORAL
Qty: 6 TABLET | Refills: 0 | Status: SHIPPED | OUTPATIENT
Start: 2024-10-05 | End: 2024-10-10

## 2024-10-05 NOTE — PROGRESS NOTES
Subjective:      Patient ID: Iram Aguirre is a 79 y.o. female.    Vitals:  height is 5' (1.524 m) and weight is 85.3 kg (188 lb). Her oral temperature is 98.5 °F (36.9 °C). Her blood pressure is 147/93 (abnormal) and her pulse is 66. Her respiration is 18 and oxygen saturation is 97%.     Chief Complaint: Sinus Problem    79-year-old female here with sinus pain and pressure, nasal congestion, ear fullness, headaches for the past 4 days.  Has been using over-the-counter ear drops and taking nasal steroid and oral antihistamine.  She reports history of recurrent sinusitis and history of aspergillosis.  Denies fever, chills, coughing, sore throat, nausea or vomiting, diarrhea, dizziness.    Sinus Problem  This is a new problem. The current episode started in the past 7 days. The problem is unchanged. There has been no fever. She is experiencing no pain. Associated symptoms include congestion, ear pain, headaches and sinus pressure. Pertinent negatives include no chills, neck pain or shortness of breath. Past treatments include oral decongestants.       Constitution: Negative for chills and fever.   HENT:  Positive for ear pain, congestion, sinus pain and sinus pressure.    Neck: Negative for neck pain.   Cardiovascular:  Negative for chest pain.   Respiratory:  Negative for shortness of breath.    Gastrointestinal:  Negative for abdominal pain, nausea, vomiting and diarrhea.   Musculoskeletal:  Negative for muscle ache.   Skin:  Negative for rash.   Neurological:  Positive for headaches. Negative for dizziness.      Objective:     Physical Exam   Constitutional: She is oriented to person, place, and time. She appears well-developed.   HENT:   Head: Normocephalic and atraumatic.   Ears:   Right Ear: External ear normal.   Left Ear: External ear normal.   Nose: Right sinus exhibits maxillary sinus tenderness. Left sinus exhibits maxillary sinus tenderness.   Mouth/Throat: Oropharynx is clear and moist.   Eyes:  Conjunctivae, EOM and lids are normal. Pupils are equal, round, and reactive to light.   Neck: Trachea normal and phonation normal. Neck supple.   Cardiovascular: Normal rate, regular rhythm, normal heart sounds and normal pulses.   Pulmonary/Chest: Effort normal and breath sounds normal. No respiratory distress.   Musculoskeletal: Normal range of motion.         General: Normal range of motion.   Neurological: She is alert and oriented to person, place, and time.   Skin: Skin is warm, dry and intact.   Psychiatric: Her speech is normal and behavior is normal. Judgment and thought content normal.   Nursing note and vitals reviewed.      Assessment:     1. Acute bacterial sinusitis        Plan:       Acute bacterial sinusitis  -     azithromycin (Z-CHRISSY) 250 MG tablet; Take 2 tablets by mouth on day 1; Take 1 tablet by mouth on days 2-5  Dispense: 6 tablet; Refill: 0              Patient Instructions                                                             Sinusitis   If your condition worsens or fails to improve we recommend that you receive another evaluation at the ER immediately or contact your PCP to discuss your concerns or return here. You must understand that you've received an urgent care treatment only and that you may be released before all your medical problems are known or treated. You the patient will arrange for followup care as instructed.   If we discussed that I think your illness is viral it will not respond to antibiotics and it will last 10-14 days. However, if over the next few days the symptoms worsen start the antibiotics I have given you.   If we discussed that you require antibiotics start them now and take them to completion.   If you are female and on BCP and do take the antibiotics, use additional methods to prevent pregnancy while on the antibiotics and for one cycle after.   Flonase (fluticasone) is a nasal spray which is available over the counter and may help with your symptoms  "  Zyrtec D, Claritin D or allegra D can also help with symptoms of congestion and drainage.   If you have hypertension avoid using the "D" which is the decongestant   If you just have drainage you can take plain zyrtec, claritin or allegra   If you just have a congested feeling you can take pseudoephedrine (unless you have high blood pressure) which you have to sign for behind the counter. Do not buy the phenylephrine which is on the shelf as it is not effective   Rest and fluids are also important.   Tylenol or ibuprofen can also be used as directed for pain unless you have an allergy to them or medical condition such as stomach ulcers, kidney or liver disease or blood thinners etc for which you should not be taking these type of medications.   If you are flying in the next few days Afrin nose drops for the airplane flight upon take off and landing may help. Other than at those times refrain from using afrin.   If you were prescribed a narcotic do not drive or operate heavy machinery while taking these medications.         "

## 2024-10-17 DIAGNOSIS — E07.9 THYROID DYSFUNCTION: ICD-10-CM

## 2024-10-17 RX ORDER — POTASSIUM CHLORIDE 750 MG/1
CAPSULE, EXTENDED RELEASE ORAL
Qty: 180 CAPSULE | Refills: 3 | Status: SHIPPED | OUTPATIENT
Start: 2024-10-17

## 2024-10-17 RX ORDER — TERAZOSIN 2 MG/1
2 CAPSULE ORAL NIGHTLY
Qty: 90 CAPSULE | Refills: 3 | Status: SHIPPED | OUTPATIENT
Start: 2024-10-17 | End: 2025-10-17

## 2024-10-17 NOTE — TELEPHONE ENCOUNTER
----- Message from Eddie sent at 10/17/2024  9:43 AM CDT -----  Contact: self  Type:  RX Refill Request    Who Called:  Patient  Refill or New Rx:  Refill  RX Name and Strength:  terazosin (HYTRIN) 2 MG capsule  terazosin (HYTRIN) 5 MG capsule  potassium chloride (MICRO-K) 10 MEQ CpSR  How is the patient currently taking it? (ex. 1XDay):  as directed  Is this a 30 day or 90 day RX:  as directed  Preferred Pharmacy with phone number:      [facility]/pharmacy #[id number] - [place], [place] - [address]  [address] [place] [address]  Phone: [phone] Fax: [phone]      Local or Mail Order:  Local  Ordering Provider:  Tom Bingham Call Back Number:  [phone]    Additional Information:  Pt need a refill.Please advise

## 2024-10-29 RX ORDER — TERAZOSIN 5 MG/1
5 CAPSULE ORAL NIGHTLY
Qty: 90 CAPSULE | Refills: 3 | Status: SHIPPED | OUTPATIENT
Start: 2024-10-29

## 2024-11-01 DIAGNOSIS — R73.9 HYPERGLYCEMIA: ICD-10-CM

## 2024-11-01 DIAGNOSIS — E07.9 THYROID DYSFUNCTION: ICD-10-CM

## 2024-11-01 RX ORDER — ISOSORBIDE MONONITRATE 30 MG/1
30 TABLET, EXTENDED RELEASE ORAL
Qty: 90 TABLET | Refills: 3 | OUTPATIENT
Start: 2024-11-01

## 2024-11-12 ENCOUNTER — OFFICE VISIT (OUTPATIENT)
Dept: FAMILY MEDICINE | Facility: CLINIC | Age: 79
End: 2024-11-12
Payer: MEDICARE

## 2024-11-12 VITALS
HEIGHT: 60 IN | HEART RATE: 60 BPM | SYSTOLIC BLOOD PRESSURE: 139 MMHG | BODY MASS INDEX: 36.91 KG/M2 | DIASTOLIC BLOOD PRESSURE: 88 MMHG | WEIGHT: 188 LBS | TEMPERATURE: 98 F

## 2024-11-12 DIAGNOSIS — K57.90 DIVERTICULOSIS: ICD-10-CM

## 2024-11-12 DIAGNOSIS — R10.9 LEFT FLANK PAIN: ICD-10-CM

## 2024-11-12 DIAGNOSIS — J06.9 UPPER RESPIRATORY TRACT INFECTION, UNSPECIFIED TYPE: Primary | ICD-10-CM

## 2024-11-12 LAB
CTP QC/QA: YES
CTP QC/QA: YES
POC MOLECULAR INFLUENZA A AGN: NEGATIVE
POC MOLECULAR INFLUENZA B AGN: NEGATIVE
SARS-COV-2 RDRP RESP QL NAA+PROBE: NEGATIVE

## 2024-11-12 PROCEDURE — 99213 OFFICE O/P EST LOW 20 MIN: CPT | Mod: S$PBB,AQ,, | Performed by: INTERNAL MEDICINE

## 2024-11-12 PROCEDURE — 99999PBSHW POCT INFLUENZA A/B MOLECULAR: Mod: PBBFAC,,,

## 2024-11-12 PROCEDURE — 87635 SARS-COV-2 COVID-19 AMP PRB: CPT | Mod: PBBFAC,PN | Performed by: INTERNAL MEDICINE

## 2024-11-12 PROCEDURE — 99999PBSHW: Mod: PBBFAC,,,

## 2024-11-12 PROCEDURE — 99999 PR PBB SHADOW E&M-EST. PATIENT-LVL III: CPT | Mod: PBBFAC,,, | Performed by: INTERNAL MEDICINE

## 2024-11-12 PROCEDURE — 99213 OFFICE O/P EST LOW 20 MIN: CPT | Mod: PBBFAC,PN | Performed by: INTERNAL MEDICINE

## 2024-11-12 PROCEDURE — 87502 INFLUENZA DNA AMP PROBE: CPT | Mod: PBBFAC,PN | Performed by: INTERNAL MEDICINE

## 2024-11-12 RX ORDER — PREDNISONE 20 MG/1
20 TABLET ORAL 2 TIMES DAILY
Qty: 8 TABLET | Refills: 0 | Status: SHIPPED | OUTPATIENT
Start: 2024-11-12 | End: 2024-11-16

## 2024-11-12 NOTE — PROGRESS NOTES
COVID and flu testing is negative.  Please do salt water gargles and steam inhalation.  Hopefully the prednisone helps you.  If no better in the next 24-72 hours, please go to urgent care center.  Please also take fluids and diet and hopefully your abdominal pain subsides.

## 2024-11-12 NOTE — PROGRESS NOTES
Subjective:       Patient ID: Iram Aguirre is a 79 y.o. female.    Chief Complaint: Sore Throat, Otalgia, and Nasal Congestion    History of Present Illness    CHIEF COMPLAINT:  Iram presents with sore throat, mucus production, and cough, with associated ear pressure and urinary frequency.    HPI:    This is the 1st time I am seeing this patient who actually has a primary care physician in [place], [place].  Currently she is in this part of [facility] and wanted an urgent visit.  The history is somewhat patchy and disconnected and lot of things did not make sense together.  Some degree of hearing loss on patient's behalf possibly some cognitive issues prevent a more reliable history.      Miss Iram Myers is a 79-year-old female, reports symptom onset after consuming a meal of red beans and rice at a hospital cafeteria on Monday. She initially had diarrhea, which has improved but persists. She developed a sore throat last night, describing it as swollen and constricted, partially attributing this to sleeping without her CPAP machine while at the hospital.    Iram has a productive cough with thick, yellow and green sputum throughout the night. She also reports ear pressure and increased urinary frequency over the past 2 days. She notes intermittent wheezing and increased dyspnea, which she attributes to increased physical activity.  She was no fever at this point.  Nonsmoker.  To the best that I could recall and quick glance over the records, she does not have a diagnosis of COPD based upon at least 2 chest x-rays done in past    Iram believes a recent exacerbation of diverticulitis was triggered by bean consumption. She consulted Dr. Spain for diarrhea on September 24th, but no fecal infection was detected and no treatment was prescribed. She has an appointment scheduled with her cardiac nurse practitioner, Carmela Santos, on Thursday morning.    Iram reports discomfort in her left flank and lower  left abdomen. She has difficulty lying flat, which she attributes to sinus problems. She states this particular combination of symptoms is unusual for her.    Iram denies fever, chest congestion, smoking, diabetes, and allergies to seafood.    MEDICAL HISTORY:  Iram has a history of hypertension, hyperlipidemia, a heart condition with murmur, sleep apnea, and diverticulitis.    SURGICAL HISTORY:  Iram has undergone four fungal surgeries in the skull. The indication and dates for these surgeries are not specified.    IMAGING:  An echocardiogram was performed on September 20, 2023. The results showed mild concentric hypertrophy and normal systolic function with an EF of 65% to 70%. Iram has a trileaflet aortic valve with moderate aortic valve sclerosis and mild stenosis. The pulmonary artery pressure was measured at 33 mmHg.    ALLERGIES:  Iram reports no allergy to seafood.    SOCIAL HISTORY:  Iram denies smoking.      ROS:  General: -fever  ENT: +sore throat  Respiratory: -cough, +shortness of breath, +wheezing, -chest congestion  Allergic: -allergic reactions   GI:-diarrhea but no blood or smell.  No mucus.  Some discomfort in left flank.  No history of kidney stones mentioned or reported.  She was status post cholecystectomy and hysterectomy and history of diverticulosis has been noted  Cardiovascular:-no chest pain or shortness a breath.  No palpitations  Neurological:-no headaches, loss of consciousness or seizure activity          Past Medical History:   Diagnosis Date    AR (allergic rhinitis) 11/29/2012    Aspergillosis     1992    Benign hypertension     Depression     Diverticulosis 2005    Esophageal polyp     Fibromyalgia     Gastric ulcer; benign 2007    GERD (gastroesophageal reflux disease) 11/29/2012    Hypercholesteremia     Hypothyroidism     Kidney stones     MTHFR gene mutation     per daughter E72.8    Obstructive sleep apnea on CPAP     Osteopenia     Rectal polyp     Skin  cancer     pre skin cancer skin    Vulvovaginal melanosis      Social History     Socioeconomic History    Marital status:     Number of children: 3   Occupational History    Occupation: teacher, phone  - retired   Tobacco Use    Smoking status: Never    Smokeless tobacco: Never   Substance and Sexual Activity    Alcohol use: No    Drug use: No    Sexual activity: Not Currently     Partners: Male     Birth control/protection: None     Social Drivers of Health     Financial Resource Strain: High Risk (12/4/2023)    Overall Financial Resource Strain (CARDIA)     Difficulty of Paying Living Expenses: Hard   Food Insecurity: Food Insecurity Present (12/4/2023)    Hunger Vital Sign     Worried About Running Out of Food in the Last Year: Sometimes true     Ran Out of Food in the Last Year: Patient declined   Transportation Needs: No Transportation Needs (12/4/2023)    PRAPARE - Transportation     Lack of Transportation (Medical): No     Lack of Transportation (Non-Medical): No   Physical Activity: Unknown (12/4/2023)    Exercise Vital Sign     Days of Exercise per Week: 0 days   Stress: No Stress Concern Present (12/4/2023)    [employer] - Occupational Stress Questionnaire     Feeling of Stress : Not at all   Housing Stability: Low Risk  (12/4/2023)    Housing Stability Vital Sign     Unable to Pay for Housing in the Last Year: No     Number of Places Lived in the Last Year: 1     Unstable Housing in the Last Year: No     Past Surgical History:   Procedure Laterality Date    CHOLECYSTECTOMY  08/08/2012    CHOLECYSTECTOMY      COLONOSCOPY N/A 02/06/2017    Procedure: COLONOSCOPY;  Surgeon: Arturo Crane MD;  Location: [facility];  Service: Endoscopy;  Laterality: N/A;    COSMETIC SURGERY  2024    Eyelids lifted    EYE SURGERY  2005    Tear duct    FOOT FRACTURE SURGERY  2005    plate in right foot    GANGLION CYST EXCISION  1980    left wrist    HYSTERECTOMY      KNEE  ARTHROSCOPY W/ ACL RECONSTRUCTION      bilateral     NASAL SEPTUM SURGERY      TEAR DUCT SURGERY  2006    left eye    TOTAL VAGINAL HYSTERECTOMY  age 31    w/BSO    TUBAL LIGATION  age 26     Family History   Problem Relation Name Age of Onset    Kidney disease Mother Anabela Macedo ( )     Depression Mother Anabela Macedo ( )     Hypertension Mother Anabela Macedo ( )     Heart disease Father Edjustice W Maradiaga III ( )     Arthritis Father Edjustice W Maradiaga III ( )     Hearing loss Father Edjustice W Maradiaga III ( )     Hyperlipidemia Father Edjustice W Maradiaga III ( )     Stroke Father Edjustice W Maradiaga III ( )     Lymphoma Sister Nora Patino Wordfrankie     Arthritis Sister Nora Patino Wordfrankie     Heart disease Sister Nora Patino Wordfrankie     Bladder Cancer Brother Yonathan Maradiaga     Hearing loss Brother Yonathan Maradiaga     Leukemia Brother Edward Maradiaga IV     Arthritis Brother Edward Maradiaga IV     Cancer Brother Edward Maradiaga IV     Depression Brother Edward Maradiaga IV     Hearing loss Brother Edward Maradiaga IV     Hyperlipidemia Brother Edward Maradiaga IV     Mental illness Brother Edward Maradiaga IV     Cancer Brother Eric Maradiaga     Depression Brother Eric Maradiaga     Hearing loss Brother Eric Maradiaga     Hyperlipidemia Brother Eric Maradiaga     Hypertension Brother Eric Maradiaga     Arthritis Sister Blanca Ocampo ()     Cancer Sister Blanca Ocampo ()     Depression Sister Blanca Ocampo ()     Hypertension Sister Blanca Frolindsey ()     Mental illness Sister Blanca Frosch ()     Arthritis Sister Maria Del Rosario Lena ()     Cancer Sister Maria Del Rosario Lena ()     Depression Sister Maria Del Rosario Lena ()     Hearing loss Sister Maria Del Rosario Lena ()     Hyperlipidemia Sister Maria Del Rosario Lena ()     Arthritis Daughter Monalisa Steven     Arthritis Paternal Aunt Meghna Izabela     Cancer Brother Juan Manuel Maradiaga     Depression Brother Juan Manuel Maradiaga     COPD  Sister Ita Amaya ( )        Objective:    Communication is somewhat difficult with a mask on and patient's hearing loss  Physical Exam  Blood pressure 139/88, pulse 60, temperature 97.7 °F (36.5 °C), height 5' (1.524 m), weight 85.3 kg (188 lb). Body mass index is 36.72 kg/m².  Physical Exam    General: No acute distress. Well-developed. Well-nourished.  Eyes: EOMI. Sclerae anicteric.  HENT: Normocephalic. Atraumatic. Nares patent. Moist oral mucosa.  Ears:  Hearing loss  Cardiovascular: Regular rate. Regular rhythm. Systolic murmur. No rubs. No gallops. Normal S1, S2.  Respiratory: Normal respiratory effort. Clear to auscultation bilaterally. No rales. No rhonchi. No wheezing.  Abdomen: Soft. Non-tender. Non-distended. Normoactive bowel sounds. Discomfort in left flank of abdomen.  Musculoskeletal: No  obvious deformity.  Extremities: No lower extremity edema.  Neurological: Alert  No slurred speech. Normal gait.  Psychiatric:  Somewhat anxious  Skin: Warm. Dry. No rash.              Assessment:       Office Visit on 2024   Component Date Value Ref Range Status    POC Rapid COVID 2024 Negative  Negative Final     Acceptable 2024 Yes   Final    POC Molecular Influenza A Ag 2024 Negative  Negative Final    POC Molecular Influenza B Ag 2024 Negative  Negative Final     Acceptable 2024 Yes   Final   Lab Visit on 2024   Component Date Value Ref Range Status    Sodium 2024 140  136 - 145 mmol/L Final    Potassium 2024 4.2  3.5 - 5.1 mmol/L Final    Chloride 2024 104  95 - 110 mmol/L Final    CO2 2024 24  23 - 29 mmol/L Final    Glucose 2024 101  70 - 110 mg/dL Final    BUN 2024 15  8 - 23 mg/dL Final    Creatinine 2024 1.0  0.5 - 1.4 mg/dL Final    Calcium 2024 9.9  8.7 - 10.5 mg/dL Final    Total Protein 2024 7.1  6.0 - 8.4 g/dL Final    Albumin 2024 3.7  3.5 - 5.2 g/dL Final     Total Bilirubin 09/24/2024 0.4  0.1 - 1.0 mg/dL Final    Alkaline Phosphatase 09/24/2024 64  55 - 135 U/L Final    AST 09/24/2024 25  10 - 40 U/L Final    ALT 09/24/2024 22  10 - 44 U/L Final    eGFR 09/24/2024 58 (A)  >60 mL/min/1.73 m^2 Final    Anion Gap 09/24/2024 12  8 - 16 mmol/L Final    Stool Culture 09/24/2024 No Salmonella,Shigella,Vibrio,Campylobacter,Yersinia isolated.   Final    Occult Blood 09/24/2024 Negative  Negative Final    Stool Exam-Ova,Cysts,Parasites 09/24/2024 FINAL 10/01/2024 0937   Final    Stool WBC 09/24/2024 No neutrophils seen  No neutrophils seen Final    Shiga Toxin 1 E.coli 09/24/2024 Negative   Final    Shiga Toxin 2 E.coli 09/24/2024 Negative   Final       Assessment & Plan    Assessed patient presenting with sore throat, mucus, diarrhea, and left flank discomfort  Considered recent history of eating red beans and rice as potential trigger for diverticulitis exacerbation  Noted yellow and green sputum production  Evaluated COVID and influenza as differential diagnoses  Recommend short course of oral corticosteroids to address throat swelling and respiratory symptoms    J02.0 viral PHARYNGITIS:  - Educated on the benefits of salt water gargles and steam inhalation for symptom relief.  - Iram to perform salt water gargles.  - Iram to do steam inhalation.  - Started cortisone pill, 1 pill twice daily for 4 days.  - no antibiotics at this time    K57.92 left flank pain with history of diverticulosis.  - Follow up in a few days to monitor diarrhea and report on progress.    R07.0 PAIN IN THROAT:  - Educated on the benefits of salt water gargles and steam inhalation for symptom relief.  - Iram to perform salt water gargles.  - Iram to do steam inhalation.    LIFESTYLE CHANGES:  - Discussed the importance of clear liquid diet to help resolve potential infection.  - Iram to maintain clear liquid diet.  - Recommend drinking plenty of fluids.         Plan:   Upper  respiratory tract infection, unspecified type  Comments:  Check for flu and COVID testing.  Both the testings are negative.  Conservative care.  No antibiotics at this point  Orders:  -     predniSONE (DELTASONE) 20 MG tablet; Take 1 tablet (20 mg total) by mouth 2 (two) times daily. for 4 days  Dispense: 8 tablet; Refill: 0  -     POCT COVID-19 Rapid Screening  -     POCT Influenza A/B Molecular    Left flank pain  Comments:  Left flank pain and left upper quadrant pain.  Nonspecific.  Clear liquid diet and see how she does.  Urgent care or ED if no relief    Diverticulosis  Comments:  History of diverticulosis.  [place] and clear liquid diet.    First order of business would be to check for COVID or influenza.    Conservative care with salt water gargles and steam inhalation as discussed above try to avoid antibiotics.  She does report a thick mucus and phlegm and difficulty sleeping and this is the issue which I am unable to clarify as to if she was any pulmonary problem or CPAP problems or issues.    She might need a chest x-ray if her symptoms continue and further investigations especially if the COVID and influenza test is negative.    She does have some left flank pain and history of diverticulosis though I can not established diagnosis of diverticulitis.  She does have some diarrhea which she attributes to eating red beans.  The hospital food is generally well cooked and there has been no outbreak of diarrhea reported in our hospital.  Will treated conservatively with hydration.      Follow up with the primary care physician.    Follow up with the emergency room or urgent care in the next 48 hours if symptoms worsen.    Her flu and COVID testing is negative.      Continue the conservative measures and further decision to be made in 24-48 hours based upon how her breathing does and abdominal pain does.    Hopefully a short burst of steroids help with the bronchitis.    Multiple medications have been reconciled  as inactive    Follow up if symptoms worsen or fail to improve.  Message left with patient concerning negative COVID and flu tests.    Current Outpatient Medications:     albuterol (PROVENTIL/VENTOLIN HFA) 90 mcg/actuation inhaler, Inhale 2 puffs into the lungs every 4 (four) hours as needed for Wheezing. Rescue, Disp: 8 g, Rfl: 1    azelastine (ASTELIN) 137 mcg (0.1 %) nasal spray, 1 spray (137 mcg total) by Nasal route once daily., Disp: 30 mL, Rfl: 5    cholecalciferol, vitamin D3, 125 mcg (5,000 unit) Tab, Take 1 tablet by mouth every evening., Disp: , Rfl:     coenzyme Q10 100 mg capsule, Place 100 mg under the tongue once daily., Disp: , Rfl:     FLUoxetine 20 MG capsule, Take 20 mg by mouth once daily. , Disp: , Rfl: 2    fluticasone propionate (FLONASE) 50 mcg/actuation nasal spray, 1 spray (50 mcg total) by Each Nostril route daily as needed for Rhinitis or Allergies., Disp: 9.9 mL, Rfl: 5    isosorbide mononitrate (IMDUR) 30 MG 24 hr tablet, Take 2 tablets (60 mg total) by mouth once daily., Disp: 90 tablet, Rfl: 3    Lactobacillus rhamnosus GG (CULTURELLE) 10 billion cell capsule, Take 1 capsule by mouth once daily., Disp: , Rfl:     levomefolate calcium (ELFOLATE) 15 mg Tab, Take 15 mg by mouth once daily., Disp: , Rfl:     loratadine (CLARITIN) 10 mg tablet, Take 10 mg by mouth once daily., Disp: , Rfl:     losartan-hydrochlorothiazide 100-25 mg (HYZAAR) 100-25 mg per tablet, Take 1 tablet by mouth once daily., Disp: 90 tablet, Rfl: 3    magnesium 30 mg tablet, Take 30 mg by mouth once daily., Disp: , Rfl:     MAGNESIUM L-THREONATE ORAL, Take 144 mg by mouth., Disp: , Rfl:     mv-mn/folic acid/vit [id number] (ALIVE ONCE DAILY WOMEN 50 PLUS ORAL), Take 1 tablet by mouth once daily., Disp: , Rfl:     om 3/E/linol/ala/oleic/gla/lip (OMEGA 3-6-9 ORAL), Take by mouth., Disp: , Rfl:     potassium chloride (MICRO-K) 10 MEQ CpSR, TAKE 1 CAPSULE BY MOUTH TWICE A DAY, Disp: 180 capsule, Rfl: 3    terazosin  (HYTRIN) 2 MG capsule, Take 1 capsule (2 mg total) by mouth every evening., Disp: 90 capsule, Rfl: 3    terazosin (HYTRIN) 5 MG capsule, TAKE 1 CAPSULE (5 MG TOTAL) BY MOUTH EVERY EVENING., Disp: 90 capsule, Rfl: 3    TIROSINT 88 mcg Cap, Take 88 mcg by mouth once daily. , Disp: , Rfl:     turmeric, bulk, 100 % Powd, Take 1 capsule by mouth once daily., Disp: , Rfl:     ALBUTEROL INHL, Inhale 1 puff into the lungs as needed., Disp: , Rfl:     predniSONE (DELTASONE) 20 MG tablet, Take 1 tablet (20 mg total) by mouth 2 (two) times daily. for 4 days, Disp: 8 tablet, Rfl: 0    This note was generated with the assistance of ambient listening technology. Verbal consent was obtained by the patient and accompanying visitor(s) for the recording of patient appointment to facilitate this note. I attest to having reviewed and edited the generated note for accuracy, though some syntax or spelling errors may persist. Please contact the author of this note for any clarification.      Tommy Metcalf

## 2024-11-12 NOTE — Clinical Note
Ian Spain- patient or seen our office today for same-day sick visit.  She has some upper respiratory tract infection which will be treated conservatively.  Negative for flu and COVID.  She states reaction to eating beans and diarrhea and left flank pain.  Nothing impressive.  I am treating it conservatively without antibiotics for the next 24-48 hours and may need a follow-up if needed.  I will advise her to go to urgent care or emergency room if appointment not available.

## 2024-11-14 ENCOUNTER — OFFICE VISIT (OUTPATIENT)
Dept: CARDIOLOGY | Facility: CLINIC | Age: 79
End: 2024-11-14
Payer: MEDICARE

## 2024-11-14 VITALS
WEIGHT: 191 LBS | DIASTOLIC BLOOD PRESSURE: 80 MMHG | OXYGEN SATURATION: 97 % | BODY MASS INDEX: 37.31 KG/M2 | SYSTOLIC BLOOD PRESSURE: 190 MMHG | HEART RATE: 69 BPM

## 2024-11-14 DIAGNOSIS — E78.00 HYPERCHOLESTEREMIA: ICD-10-CM

## 2024-11-14 DIAGNOSIS — I25.110 ATHEROSCLEROSIS OF NATIVE CORONARY ARTERY OF NATIVE HEART WITH UNSTABLE ANGINA PECTORIS: ICD-10-CM

## 2024-11-14 DIAGNOSIS — I10 ESSENTIAL HYPERTENSION: ICD-10-CM

## 2024-11-14 DIAGNOSIS — E07.9 THYROID DYSFUNCTION: ICD-10-CM

## 2024-11-14 DIAGNOSIS — R07.9 CHEST PAIN, UNSPECIFIED TYPE: ICD-10-CM

## 2024-11-14 DIAGNOSIS — R06.09 DYSPNEA ON EXERTION: Primary | ICD-10-CM

## 2024-11-14 PROCEDURE — 99999 PR PBB SHADOW E&M-EST. PATIENT-LVL IV: CPT | Mod: PBBFAC,,,

## 2024-11-14 PROCEDURE — 99214 OFFICE O/P EST MOD 30 MIN: CPT | Mod: PBBFAC,PN

## 2024-11-14 RX ORDER — ISOSORBIDE MONONITRATE 30 MG/1
60 TABLET, EXTENDED RELEASE ORAL DAILY
Qty: 90 TABLET | Refills: 3 | Status: SHIPPED | OUTPATIENT
Start: 2024-11-14

## 2024-11-14 RX ORDER — ECHINACEA 380 MG
25 CAPSULE ORAL DAILY
COMMUNITY

## 2024-11-14 RX ORDER — SEMAGLUTIDE 0.25 MG/.5ML
0.25 INJECTION, SOLUTION SUBCUTANEOUS WEEKLY
Qty: 4 ML | Refills: 2 | Status: SHIPPED | OUTPATIENT
Start: 2024-11-14

## 2024-11-14 NOTE — ASSESSMENT & PLAN NOTE
Blood pressure elevated today in clinic at 190/80 however patient states that when she checks it at home is in the 130s.  She also admits to being very stressed out today as her daughter recently was in the hospital and underwent GI surgery.  Educated patient on the importance of keeping a blood pressure log at home and to please reach out if remains elevated so we can adjust her current regimen.  For now, continue on Imdur and Hyzaar as prescribed.  Encouraged low-sodium diet.

## 2024-11-14 NOTE — ASSESSMENT & PLAN NOTE
Latest Reference Range & Units 07/02/24 11:43   Cholesterol Total 120 - 199 mg/dL 234 (H)   HDL 40 - 75 mg/dL 64   HDL/Cholesterol Ratio 20.0 - 50.0 % 27.4   Non-HDL Cholesterol mg/dL 170   Total Cholesterol/HDL Ratio 2.0 - 5.0  3.7   Triglycerides 30 - 150 mg/dL 87   LDL Cholesterol 63.0 - 159.0 mg/dL 152.6   (H): Data is abnormally high    Last lipid panel not at goal.  Encouraged heart healthy low-fat low-cholesterol diet.  Continue Omega 3 fatty fish oil supplements.

## 2024-11-14 NOTE — PROGRESS NOTES
Subjective:    Patient ID:  Iram Aguirre is a 79 y.o. female who presents for follow-up.   Chief Complaint   Patient presents with    Hypertension       HPI:  Iram Aguirre is here for follow-up visit. She does admit to one episode of chest pain that she described as a stabbing pain underneath her left shoulder blade associated with shortness of breath.  She also admits to being under a lot of stress recently as her daughter was recently hospitalized with GI related symptoms and she has been working hard outside in the [employer] lately.  Does admit to shortness of breath with exertion and would like to be further evaluated.  Denies recent fever cough chills or congestion. Denies blood in the urine or blood in the stool.  Denies myalgias. Denies orthopnea or peripheral edema. Denies nausea vomiting or dyspepsia. No recent arm neck or jaw pain. No lightheadedness or dizziness.         Review of patient's allergies indicates:   Allergen Reactions    Cortisone Shortness Of Breath     Causes heart to race    Hair formula [mv,iron,min-folic acid-biotin]      dye    Levaquin [levofloxacin] Other (See Comments)     Tongue swelling and blisters    Codeine Nausea Only    Doxycycline      Other reaction(s): Unknown       Past Medical History:   Diagnosis Date    AR (allergic rhinitis) 11/29/2012    Aspergillosis     1992    Benign hypertension     Depression     Diverticulosis 2005    Esophageal polyp     Fibromyalgia     Gastric ulcer; benign 2007    GERD (gastroesophageal reflux disease) 11/29/2012    Hypercholesteremia     Hypothyroidism     Kidney stones     MTHFR gene mutation     per daughter E72.8    Obstructive sleep apnea on CPAP     Osteopenia     Rectal polyp     Skin cancer     pre skin cancer skin    Vulvovaginal melanosis      Past Surgical History:   Procedure Laterality Date    CHOLECYSTECTOMY  08/08/2012    CHOLECYSTECTOMY      COLONOSCOPY N/A 02/06/2017    Procedure: COLONOSCOPY;  Surgeon: Arturo Crane,  MD;  Location: [facility];  Service: Endoscopy;  Laterality: N/A;    COSMETIC SURGERY      Eyelids lifted    EYE SURGERY      Tear duct    FOOT FRACTURE SURGERY      plate in right foot    GANGLION CYST EXCISION      left wrist    HYSTERECTOMY      KNEE ARTHROSCOPY W/ ACL RECONSTRUCTION      bilateral     NASAL SEPTUM SURGERY      TEAR DUCT SURGERY  2006    left eye    TOTAL VAGINAL HYSTERECTOMY  age 31    w/BSO    TUBAL LIGATION  age 26     Social History     Tobacco Use    Smoking status: Never    Smokeless tobacco: Never   Substance Use Topics    Alcohol use: No    Drug use: No     Family History   Problem Relation Name Age of Onset    Kidney disease Mother Anabela Macedo ( )     Depression Mother Anabela Macedo ( )     Hypertension Mother Anabela Macedo ( )     Heart disease Father Edjustice W Maradiaga III ( )     Arthritis Father Edjustice W Maradiaga III ( )     Hearing loss Father Edward W Maradiaga III ( )     Hyperlipidemia Father Edjustice W Maradiaga III ( )     Stroke Father Edjustice W Maradiaga III ( )     Lymphoma Sister Nora Patino Wordfrankie     Arthritis Sister Nora Patino Wordfrankie     Heart disease Sister Nora Patino Wordfrankie     Bladder Cancer Brother Yonathan Maradiaga     Hearing loss Brother Yonathan Maradiaga     Leukemia Brother Edward Maradiaga IV     Arthritis Brother Edward Maradiaga IV     Cancer Brother Edward Maradiaga IV     Depression Brother Edward Maradiaga IV     Hearing loss Brother Edward Maradiaga IV     Hyperlipidemia Brother Edward Maradiaga IV     Mental illness Brother Edward Maradiaga IV     Cancer Brother Eric Maradiaga     Depression Brother Eric Maradiaga     Hearing loss Brother Eric Maradiaga     Hyperlipidemia Brother Eric Maradiaga     Hypertension Brother Eric Maradiaga     Arthritis Sister Blanca Ocampo ()     Cancer Sister Blanca Ocampo ()     Depression Sister Blanca Causeylindsey ()     Hypertension Sister Blanca Causeylindsey ()     Mental illness Sister  Blanca Ocampo ()     Arthritis Sister Maria Del Rosario Paredes ()     Cancer Sister Maria Del Rosario Paredes ()     Depression Sister Maria Del Rosario Paredes ()     Hearing loss Sister Maria Del Rosario Paredes ()     Hyperlipidemia Sister Maria Del Rosario Paredes ()     Arthritis Daughter Monalisa Harris     Arthritis Paternal Aunt Meghna Gurrola     Cancer Brother Juan Manuel Maradiaga     Depression Brother Juan Manuel Maradiaga     COPD Sister Ita Amaya ( )         Review of Systems:   Constitution: Negative for diaphoresis and fever.   HEENT: Negative for nosebleeds.    Cardiovascular:  Positive for chest pain    Positive dyspnea on exertion       No leg swelling        No palpitations  Respiratory: Negative for shortness of breath and wheezing.    Hematologic/Lymphatic: Negative for bleeding problem. Does not bruise/bleed easily.   Skin: Negative for color change and rash.   Musculoskeletal: Negative for falls and myalgias.   Gastrointestinal: Negative for hematemesis and hematochezia.   Genitourinary: Negative for hematuria.   Neurological: Negative for dizziness and light-headedness.   Psychiatric/Behavioral: Negative for altered mental status and memory loss.          Objective:        Vitals:    24 0955   BP: (!) 190/80   Pulse: 69       Lab Results   Component Value Date    WBC 7.95 2024    HGB 12.4 2024    HCT 37.7 2024     2024    CHOL 234 (H) 2024    TRIG 87 2024    HDL 64 2024    ALT 22 2024    AST 25 2024     2024    K 4.2 2024     2024    CREATININE 1.0 2024    BUN 15 2024    CO2 24 2024    TSH 0.733 2024    INR 1.2 2021    HGBA1C 5.5 2024        ECHOCARDIOGRAM RESULTS  Results for orders placed during the hospital encounter of 23    Echo    Interpretation Summary    Left Ventricle: The left ventricle is normal in size. Mildly increased wall thickness. There is mild  concentric hypertrophy. Normal wall motion. There is normal systolic function with a visually estimated ejection fraction of 65 - 70%. Grade I diastolic dysfunction.    Right Ventricle: Normal right ventricular cavity size. Systolic function is normal.    Aortic Valve: The aortic valve is a trileaflet valve. There is moderate aortic valve sclerosis. There is mild stenosis. Aortic valve area by VTI is 1.77 cm². Aortic valve peak velocity is 2.57 m/s. Mean gradient is 16 mmHg. The dimensionless index is 0.54.    Pulmonary Artery: The estimated pulmonary artery systolic pressure is 33 mmHg.        CURRENT/PREVIOUS VISIT EKGDictation #1  MRN:[id number]  CSN:[id number]   Results for orders placed or performed during the hospital encounter of 09/20/21   EKG 12-lead    Collection Time: 09/20/21 11:18 AM    Narrative    Test Reason : R07.9,    Vent. Rate : 069 BPM     Atrial Rate : 069 BPM     P-R Int : 160 ms          QRS Dur : 126 ms      QT Int : 430 ms       P-R-T Axes : 067 065 042 degrees     QTc Int : 460 ms    Normal sinus rhythm  Right bundle branch block  Abnormal ECG  When compared with ECG of 24-MAR-2021 18:13,  No significant change was found  Confirmed by Koko Oropeza MD ([id number]) on 9/22/2021 10:50:37 PM    Referred By: [email or web address]   SELF           Confirmed By:Koko Oropeza MD     No valid procedures specified.   Results for orders placed during the hospital encounter of 10/09/23    Nuclear Stress - Cardiology Interpreted    Interpretation Summary    Normal myocardial perfusion scan. There is no evidence of myocardial ischemia or infarction.    There is a trivial to mild intensity perfusion abnormality in the anteroapical wall of the left ventricle, secondary to breast attenuation.    The gated perfusion images showed an ejection fraction of 90% at rest. The gated perfusion images showed an ejection fraction of 73% post stress. Normal ejection fraction is greater than 53%.    There is normal wall motion at rest and  post stress.    LV cavity size is normal at rest and normal at stress.    The ECG portion of the study is negative for ischemia.    The patient reported no chest pain during the stress test.    There were no arrhythmias during stress.      Physical Exam:  CONSTITUTIONAL: No fever, no chills  HEENT: Normocephalic, atraumatic,pupils reactive to light                 NECK:  No JVD no carotid bruit  CVS: S1S2+, RRR, no murmurs,   LUNGS: Clear  ABDOMEN: Soft, NT, BS+  EXTREMITIES: No cyanosis, edema  : No weber catheter  NEURO: AAO X 3  PSY: Normal affect      Medication List with Changes/Refills   New Medications    SEMAGLUTIDE, WEIGHT LOSS, (WEGOVY) 0.25 MG/0.5 ML PNIJ    Inject 0.25 mg into the skin once a week.   Current Medications    ALBUTEROL (PROVENTIL/VENTOLIN HFA) 90 MCG/ACTUATION INHALER    Inhale 2 puffs into the lungs every 4 (four) hours as needed for Wheezing. Rescue    ALBUTEROL INHL    Inhale 1 puff into the lungs as needed.    ALOE VERA 25 MG CAP    Take 25 mg by mouth once daily.    AZELASTINE (ASTELIN) 137 MCG (0.1 %) NASAL SPRAY    1 spray (137 mcg total) by Nasal route once daily.    CHOLECALCIFEROL, VITAMIN D3, 125 MCG (5,000 UNIT) TAB    Take 1 tablet by mouth every evening.    COENZYME Q10 100 MG CAPSULE    Place 100 mg under the tongue once daily.    FLUOXETINE 20 MG CAPSULE    Take 20 mg by mouth once daily.     FLUTICASONE PROPIONATE (FLONASE) 50 MCG/ACTUATION NASAL SPRAY    1 spray (50 mcg total) by Each Nostril route daily as needed for Rhinitis or Allergies.    LACTOBACILLUS RHAMNOSUS GG (CULTURELLE) 10 BILLION CELL CAPSULE    Take 1 capsule by mouth once daily.    LEVOMEFOLATE CALCIUM (ELFOLATE) 15 MG TAB    Take 15 mg by mouth once daily.    LORATADINE (CLARITIN) 10 MG TABLET    Take 10 mg by mouth once daily.    LOSARTAN-HYDROCHLOROTHIAZIDE 100-25 MG (HYZAAR) 100-25 MG PER TABLET    Take 1 tablet by mouth once daily.    MAGNESIUM L-THREONATE ORAL    Take 144 mg by mouth.    MV-MN/FOLIC  ACID/VIT [id number] (ALIVE ONCE DAILY WOMEN 50 PLUS ORAL)    Take 1 tablet by mouth once daily.    OM 3/E/LINOL/ALA/OLEIC/GLA/LIP (OMEGA 3-6-9 ORAL)    Take by mouth.    POTASSIUM CHLORIDE (MICRO-K) 10 MEQ CPSR    TAKE 1 CAPSULE BY MOUTH TWICE A DAY    PREDNISONE (DELTASONE) 20 MG TABLET    Take 1 tablet (20 mg total) by mouth 2 (two) times daily. for 4 days    TERAZOSIN (HYTRIN) 2 MG CAPSULE    Take 1 capsule (2 mg total) by mouth every evening.    TERAZOSIN (HYTRIN) 5 MG CAPSULE    TAKE 1 CAPSULE (5 MG TOTAL) BY MOUTH EVERY EVENING.    TIROSINT 88 MCG CAP    Take 88 mcg by mouth once daily.     TURMERIC, BULK, 100 % POWD    Take 1 capsule by mouth once daily.   Changed and/or Refilled Medications    Modified Medication Previous Medication    ISOSORBIDE MONONITRATE (IMDUR) 30 MG 24 HR TABLET isosorbide mononitrate (IMDUR) 30 MG 24 hr tablet       Take 2 tablets (60 mg total) by mouth once daily.    Take 2 tablets (60 mg total) by mouth once daily.   Discontinued Medications    MAGNESIUM 30 MG TABLET    Take 30 mg by mouth once daily.             Assessment:       1. Dyspnea on exertion    2. Hypercholesteremia    3. Thyroid dysfunction    4. Essential hypertension    5. Chest pain, unspecified type    6. 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  Encouraged heart healthy low-fat low-cholesterol diet.  Continue Omega 3 fatty fish oil supplements.         Chest pain     Current Assessment & Plan     We will repeat nuclear stress test.  Continue Imdur.         Essential hypertension    Current Assessment & Plan     Blood pressure elevated today in clinic at 190/80 however patient states that when she checks it at home is in the 130s.  She also admits to being very stressed out today as her daughter recently was in the hospital and underwent GI surgery.  Educated patient on the importance of keeping a blood pressure log at home and to please reach out if remains elevated so we can adjust her current regimen.  For now, continue on Imdur and Hyzaar as prescribed.  Encouraged low-sodium diet.         Dyspnea on exertion - Primary    Current Assessment & Plan     We will further evaluate with a repeat nuclear stress test due to concerning symptoms.  For now, continue Imdur 60 mg daily and Hyzaar 100-25 mg daily.         Relevant Orders    IN OFFICE EKG 12-LEAD (to Muse)    Nuclear Stress - Cardiology Interpreted    Atherosclerotic heart disease of native coronary artery with unstable angina pectoris    Current Assessment & Plan     Encouraged heart healthy diet and exercise as tolerated.    We will start on Wegovy weekly for cardiovascular benefits as well as weight loss.            Endocrine    Thyroid dysfunction    Relevant Medications    isosorbide mononitrate (IMDUR) 30 MG 24 hr tablet       Follow up in about 3 months (around 2/14/2025).

## 2024-11-14 NOTE — ASSESSMENT & PLAN NOTE
Encouraged heart healthy diet and exercise as tolerated.    We will start on Wegovy weekly for cardiovascular benefits as well as weight loss.

## 2024-11-14 NOTE — ASSESSMENT & PLAN NOTE
We will further evaluate with a repeat nuclear stress test due to concerning symptoms.  For now, continue Imdur 60 mg daily and Hyzaar 100-25 mg daily.

## 2024-11-25 ENCOUNTER — TELEPHONE (OUTPATIENT)
Dept: CARDIOLOGY | Facility: HOSPITAL | Age: 79
End: 2024-11-25

## 2024-11-25 NOTE — TELEPHONE ENCOUNTER
Left message on voicemail.     Patient advised, test will be at [facility] ([address]   Will need to register on the first floor at the main entrance.   Patient advised that arrival time is 6:50am.  Patient advised that she may be here about 3.5-4 hours, and may want to bring something to occupy their time, as there will be periods of waiting.    Patient advised, may take her medications prior to testing if you need to.  Patient should HOLD Isosorbide. Advised if she needs to eat to take her medications, please keep it light, like toast and juice.    Patient advised to avoid all caffeine 12 hours prior to testing.  This includes decaf tea and coffee.    Will provide peanut butter crackers for a snack after stress test.  If patient would prefer something else, please bring a snack from home.    Wear comfortable clothing.   No lotions, oils, or powders to the upper chest area. May wear deodorant.    No metal jewelry, buttons, or zippers to the upper body.  Advised to call the office if any questions.     Will send instructions via [employer] as well.

## 2024-11-26 ENCOUNTER — HOSPITAL ENCOUNTER (OUTPATIENT)
Dept: RADIOLOGY | Facility: HOSPITAL | Age: 79
Discharge: HOME | End: 2024-11-26
Payer: MEDICARE

## 2024-11-26 ENCOUNTER — HOSPITAL ENCOUNTER (OUTPATIENT)
Dept: CARDIOLOGY | Facility: HOSPITAL | Age: 79
Discharge: HOME | End: 2024-11-26
Payer: MEDICARE

## 2024-11-26 DIAGNOSIS — R06.09 DYSPNEA ON EXERTION: ICD-10-CM

## 2024-11-26 LAB
CV PHARM DOSE: 0.4 MG
CV STRESS BASE HR: 68 BPM
DIASTOLIC BLOOD PRESSURE: 74 MMHG
EJECTION FRACTION- HIGH: 65 %
END DIASTOLIC INDEX-HIGH: 153 ML/M2
END DIASTOLIC INDEX-LOW: 93 ML/M2
END SYSTOLIC INDEX-HIGH: 71 ML/M2
END SYSTOLIC INDEX-LOW: 31 ML/M2
NUC REST DIASTOLIC VOLUME INDEX: 70
NUC REST EJECTION FRACTION: 69
NUC REST SYSTOLIC VOLUME INDEX: 22
NUC STRESS DIASTOLIC VOLUME INDEX: 79
NUC STRESS EJECTION FRACTION: 70 %
NUC STRESS SYSTOLIC VOLUME INDEX: 24
OHS CV CPX 1 MINUTE RECOVERY HEART RATE: 86 BPM
OHS CV CPX 85 PERCENT MAX PREDICTED HEART RATE MALE: 121
OHS CV CPX MAX PREDICTED HEART RATE: 142
OHS CV CPX PATIENT IS FEMALE: 1
OHS CV CPX PATIENT IS MALE: 0
OHS CV CPX PEAK DIASTOLIC BLOOD PRESSURE: 65 MMHG
OHS CV CPX PEAK HEAR RATE: 86 BPM
OHS CV CPX PEAK RATE PRESSURE PRODUCT: NORMAL
OHS CV CPX PEAK SYSTOLIC BLOOD PRESSURE: 169 MMHG
OHS CV CPX PERCENT MAX PREDICTED HEART RATE ACHIEVED: 63
OHS CV CPX RATE PRESSURE PRODUCT PRESENTING: NORMAL
OHS CV INITIAL DOSE: 12.4 MCG/KG/MIN
OHS CV PEAK DOSE: 25.3 MCG/KG/MIN
RETIRED EF AND QEF - SEE NOTES: 53 %
SYSTOLIC BLOOD PRESSURE: 180 MMHG

## 2024-11-26 PROCEDURE — 93017 CV STRESS TEST TRACING ONLY: CPT

## 2024-11-26 PROCEDURE — 63600175 PHARM REV CODE 636 W HCPCS

## 2024-11-26 PROCEDURE — A9502 TC99M TETROFOSMIN: HCPCS

## 2024-11-26 RX ORDER — REGADENOSON 0.08 MG/ML
0.4 INJECTION, SOLUTION INTRAVENOUS ONCE
Status: COMPLETED | OUTPATIENT
Start: 2024-11-26 | End: 2024-11-26

## 2024-11-26 RX ADMIN — REGADENOSON 0.4 MG: 0.08 INJECTION, SOLUTION INTRAVENOUS at 08:11

## 2024-11-26 RX ADMIN — TETROFOSMIN 12.4 MILLICURIE: 1.38 INJECTION, POWDER, LYOPHILIZED, FOR SOLUTION INTRAVENOUS at 06:11

## 2024-11-26 RX ADMIN — TETROFOSMIN 25.3 MILLICURIE: 1.38 INJECTION, POWDER, LYOPHILIZED, FOR SOLUTION INTRAVENOUS at 08:11

## 2024-11-27 RX ORDER — POTASSIUM CHLORIDE 750 MG/1
CAPSULE, EXTENDED RELEASE ORAL
Qty: 180 CAPSULE | Refills: 1 | Status: SHIPPED | OUTPATIENT
Start: 2024-11-27

## 2024-12-06 DIAGNOSIS — E07.9 THYROID DYSFUNCTION: ICD-10-CM

## 2024-12-10 RX ORDER — TERAZOSIN 2 MG/1
2 CAPSULE ORAL NIGHTLY
Qty: 90 CAPSULE | Refills: 3 | Status: SHIPPED | OUTPATIENT
Start: 2024-12-10 | End: 2025-12-10

## 2025-01-08 ENCOUNTER — TELEPHONE (OUTPATIENT)
Dept: CARDIOLOGY | Facility: CLINIC | Age: 80
End: 2025-01-08
Payer: MEDICARE

## 2025-01-08 NOTE — LETTER
.  [facility]  [address]  [address]  [place] [address]  Phone: [phone]  Fax: [phone] Date: 2025    Patient: CURLY RUIZ                     MRN#:[id number]  : 1945  Referring Physician: DR. PURI           Procedure: ENDOSCOPY    Current Outpatient Medications   Medication Sig Dispense Refill    albuterol (PROVENTIL/VENTOLIN HFA) 90 mcg/actuation inhaler Inhale 2 puffs into the lungs every 4 (four) hours as needed for Wheezing. Rescue (Patient not taking: Reported on 2024) 8 g 1    ALBUTEROL INHL Inhale 1 puff into the lungs as needed. (Patient not taking: Reported on 2024)      aloe vera 25 mg Cap Take 25 mg by mouth once daily.      azelastine (ASTELIN) 137 mcg (0.1 %) nasal spray 1 spray (137 mcg total) by Nasal route once daily. 30 mL 5    cholecalciferol, vitamin D3, 125 mcg (5,000 unit) Tab Take 1 tablet by mouth every evening.      coenzyme Q10 100 mg capsule Place 100 mg under the tongue once daily. (Patient not taking: Reported on 2024)      FLUoxetine 20 MG capsule Take 20 mg by mouth once daily.   2    fluticasone propionate (FLONASE) 50 mcg/actuation nasal spray 1 spray (50 mcg total) by Each Nostril route daily as needed for Rhinitis or Allergies. 9.9 mL 5    isosorbide mononitrate (IMDUR) 30 MG 24 hr tablet Take 2 tablets (60 mg total) by mouth once daily. 90 tablet 3    Lactobacillus rhamnosus GG (CULTURELLE) 10 billion cell capsule Take 1 capsule by mouth once daily.      levomefolate calcium (ELFOLATE) 15 mg Tab Take 15 mg by mouth once daily.      loratadine (CLARITIN) 10 mg tablet Take 10 mg by mouth once daily.      losartan-hydrochlorothiazide 100-25 mg (HYZAAR) 100-25 mg per tablet Take 1 tablet by mouth once daily. 90 tablet 3    MAGNESIUM L-THREONATE ORAL Take 144 mg by mouth.      mv-mn/folic acid/vit [id number] (ALIVE ONCE DAILY WOMEN 50 PLUS ORAL) Take 1 tablet by mouth once  daily.      om 3/E/linol/ala/oleic/gla/lip (OMEGA 3-6-9 ORAL) Take by mouth.      potassium chloride (MICRO-K) 10 MEQ CpSR TAKE 1 CAPSULE BY MOUTH TWICE A  capsule 1    semaglutide, weight loss, (WEGOVY) 0.25 mg/0.5 mL PnIj Inject 0.25 mg into the skin once a week. 4 mL 2    terazosin (HYTRIN) 2 MG capsule TAKE 1 CAPSULE (2 MG TOTAL) BY MOUTH EVERY EVENING 90 capsule 3    terazosin (HYTRIN) 5 MG capsule TAKE 1 CAPSULE (5 MG TOTAL) BY MOUTH EVERY EVENING. 90 capsule 3    TIROSINT 88 mcg Cap Take 88 mcg by mouth once daily.       turmeric, bulk, 100 % Powd Take 1 capsule by mouth once daily.       No current facility-administered medications for this visit.       This patient has been assessed for risk factors for clearance of surgery with the following stipulations:    [x] No Contraindications.      [x] Recommendations for NONE.    [x] Patient is MODERATE RISK    [x] Cleared for surgery.    [] Not Cleared for surgery due to the following reasons:    If you have any questions regarding the above, please contact my office at [phone]    Clearing Clinician:         DR. CHRISTINA VU

## 2025-01-08 NOTE — TELEPHONE ENCOUNTER
----- Message from Alethea sent at 1/8/2025 12:32 PM CST -----  Contact: self  Type:  Needs Medical Advice    Who Called: self  Symptoms (please be specific): pt needs clearance for endoscopy and colonoscopy, needs to be sent to [facility] in [place], [place], phone # is [phone]       Would the patient rather a call back or a response via [facility]? call  Best Call Back Number: [phone] (home)     Additional Information: please advise and thank you.

## 2025-01-16 ENCOUNTER — OFFICE VISIT (OUTPATIENT)
Dept: ORTHOPEDICS | Facility: CLINIC | Age: 80
End: 2025-01-16
Payer: MEDICARE

## 2025-01-16 ENCOUNTER — TELEPHONE (OUTPATIENT)
Dept: FAMILY MEDICINE | Facility: CLINIC | Age: 80
End: 2025-01-16
Payer: MEDICARE

## 2025-01-16 VITALS — BODY MASS INDEX: 37.53 KG/M2 | WEIGHT: 191.13 LBS | HEIGHT: 60 IN

## 2025-01-16 DIAGNOSIS — M17.12 PRIMARY OSTEOARTHRITIS OF LEFT KNEE: Primary | ICD-10-CM

## 2025-01-16 PROCEDURE — 99213 OFFICE O/P EST LOW 20 MIN: CPT | Mod: S$PBB,25,, | Performed by: ORTHOPAEDIC SURGERY

## 2025-01-16 PROCEDURE — 99999 PR PBB SHADOW E&M-EST. PATIENT-LVL III: CPT | Mod: PBBFAC,,, | Performed by: ORTHOPAEDIC SURGERY

## 2025-01-16 PROCEDURE — 99999PBSHW PR PBB SHADOW TECHNICAL ONLY FILED TO HB: Mod: PBBFAC,,,

## 2025-01-16 PROCEDURE — 20610 DRAIN/INJ JOINT/BURSA W/O US: CPT | Mod: PBBFAC,PN,LT | Performed by: ORTHOPAEDIC SURGERY

## 2025-01-16 PROCEDURE — 99213 OFFICE O/P EST LOW 20 MIN: CPT | Mod: PBBFAC,PN | Performed by: ORTHOPAEDIC SURGERY

## 2025-01-16 RX ORDER — TRIAMCINOLONE ACETONIDE 40 MG/ML
60 INJECTION, SUSPENSION INTRA-ARTICULAR; INTRAMUSCULAR
Status: DISCONTINUED | OUTPATIENT
Start: 2025-01-16 | End: 2025-01-16 | Stop reason: HOSPADM

## 2025-01-16 RX ADMIN — TRIAMCINOLONE ACETONIDE 60 MG: 40 INJECTION, SUSPENSION INTRA-ARTICULAR; INTRAMUSCULAR at 11:01

## 2025-01-16 NOTE — PROCEDURES
Large Joint Aspiration/Injection: L knee    Date/Time: 1/16/2025 11:20 AM    Performed by: Kapil Granados MD  Authorized by: Kapil Granados MD    Consent Done?:  Yes (Verbal)  Indications:  Arthritis and pain  Prep: patient was prepped and draped in usual sterile fashion      Local anesthesia used?: Yes    Anesthesia:  Local infiltration  Local anesthetic:  Topical anesthetic and lidocaine 1% without epinephrine    Details:  Needle Size:  22 G  Approach:  Anterolateral  Location:  Knee  Site:  L knee  Medications:  60 mg triamcinolone acetonide 40 mg/mL  Patient tolerance:  Patient tolerated the procedure well with no immediate complications

## 2025-01-16 NOTE — TELEPHONE ENCOUNTER
Returned call to Iram who explained that she felt on both knees and saw Dr. DEBORA Granados who recommend f/u with PCP. Patient stated that her left foot has pain to touch, a bakers cyst behind the left knee, and edema to LLE from mid calf to foot. Patient explained that Dr. DEBORA Granados has concerns for the swelling and her kidneys. Nurse assisted patient with scheduling appointment on 01/22/2025.

## 2025-01-16 NOTE — PROGRESS NOTES
EST PATIENT ORTHOPAEDIC: HIP    PRIMARY CARE PHYSICIAN: Gato Spain MD   REFERRING PROVIDER: No referring provider defined for this encounter.     ASSESSMENT & PLAN:    Impression:  Lumbar Radiculopathy  DDD Lumbar Spine    Left Knee Moderate Osteoarthritis    Follow Up Plan: PRN    Non operative care:    Iram Aguirre has physical exam evidence of above and wishes to pursue an non-operative care. I am recommending the following: continued follow up with pain management, left knee steroid injection    To review:  Patient comes in with a long history of having bilateral lower back and hip pain but worse on the left.  She reports a posterior and lateral based hip pain that can extend when she is lying down at night to her knee but not past it.  She also has some lateral compartment arthritis on the left knee that is being treated with viscosupplementation injections, last of which was 2 months ago with another ortho provider.  She did not have significant relief after that last injection but these did work for her previously.  She had never tried steroid injections.  She associates this with being told in the past that steroids caused increased shortness of breath.  I believe this to be oral based steroids and not the knee injections based on further discussion.  I think she should be able to tolerate knee injections and she is not a diabetic.  I do not have any other contraindications that I can determine why she would not benefit from this treatment modality.  So we did a left knee steroid injection about 6 months ago.  That provided her good relief up until a few weeks ago which she sustained 2 different falls and has had a exacerbation of her pain.  She has no changes in the characteristics her quality of her pain that was seen previously.  I am not concerned for fracture.  I do not think MRI or x-ray would be helpful at this time.  I think continuing intra-articular steroid injections would be the most  beneficial and I think this is likely an exacerbation of her arthritis.  If greater than 3 months of relief can continue those injections going forward assuming no systemic side effects.  If this fails to provide relief then really she is left with surgical considerations for that knee.    With regard to her back and leg pain.  She is actively being worked up by vascular to rule out blood clots.  This sounds more neuropathic based and relative to the radiographs that I have of her lumbar spine I think it is more radiculopathy.  She sees Dr. Sosa again for further assessment of this pain.  Her repeat hip radiographs look good today.  She does not have any pain with internal or external rotation of her hip.     The patient has been ordered:  Left knee steroid injection    CONSULTS:   None    ACTIVE PROBLEM LIST  Patient Active Problem List   Diagnosis    Gallstones    Hypothyroidism    Hypercholesteremia    Osteopenia    Obstructive sleep apnea on CPAP    Vitamin D deficiency disease    Fibromyalgia    Rectal polyp    AR (allergic rhinitis)    GERD (gastroesophageal reflux disease)    Vulvovaginal melanosis    Hx of colonic polyps    Chest pain    Anxiety    Essential hypertension    Statin intolerance    Respiratory tract infection due to COVID-19 virus    Atypical chest pain    Cervical neuropathy    Thyroid dysfunction    Chronic low back pain with sciatica    Dyspnea on exertion    Hyperglycemia    Abnormal electrocardiogram (ECG) (EKG)    Nonrheumatic aortic valve stenosis    Bilateral carotid bruits    Vitreous degeneration of both eyes    Pseudophakia of both eyes    Dry eye syndrome of both eyes    Cervical spondylosis    DDD (degenerative disc disease), cervical    Lumbar spondylosis    DDD (degenerative disc disease), lumbar    Stable angina    Atherosclerotic heart disease of native coronary artery with unstable angina pectoris           SUBJECTIVE    CHIEF COMPLAINT: Hip Pain    HPI:   Iram Aguirre  is a 79 y.o. female here for evaluation and management of left hip pain. There is not a specific incident that brought about this pain. she has had progressive problems with the hip(s) starting 1 years ago and is progressing which is now interfering with activities which include: walking and functional household ADL's    Currently the pain in the joint is moderate with activity.  The pain is intermittent and is located in buttocks and lateral hip.  The pain is described as aching, radiating, and shooting . Relieving factors include rest, over the counter medication , and repositioning. No associated Catching, Clicking, and Popping.     They do not report leg length inequality.     Iram Aguirre has no additional complaints.     7/25/24: Here for follow up of ongoing bilateral leg pains and left knee pain.     1/16/25:  Patient here today with return of left knee pain.  Previous injection she tolerated well.  It provided her good relief.  Helping with her knee also help alleviate some of her lower back pain.  She unfortunately had 2 different falls that resulted in exacerbation of her left knee pain.  She does not have any instability.  She does have some complaints of swelling in her ankle, not previously evaluated.  With regard to the knee specifically I think she is asking for repeat injection.    PROGRESSIVE SYMPTOMS:  Pain impacting sleep  Pain worsened by weight bearing  Pain effecting living situation    FUNCTIONAL STATUS:   Climb a flight of stairs or walk up a hill     PREVIOUS TREATMENTS:  Medical: OTC NSAIDS and Tylenol  Physical Therapy: Activities Modified   Previous Orthopaedic Surgery: None    REVIEW OF SYSTEMS:  PAIN ASSESSMENT:  See HPI.  MUSCULOSKELETAL: See HPI.  OTHER 10 point review of systems is negative except as stated in HPI above    PAST MEDICAL HISTORY   has a past medical history of AR (allergic rhinitis) (11/29/2012), Aspergillosis, Benign hypertension, Depression, Diverticulosis  (2005), Esophageal polyp, Fibromyalgia, Gastric ulcer; benign (2007), GERD (gastroesophageal reflux disease) (11/29/2012), Hypercholesteremia, Hypothyroidism, Kidney stones, MTHFR gene mutation, Obstructive sleep apnea on CPAP, Osteopenia, Rectal polyp, Skin cancer, and Vulvovaginal melanosis.     PAST SURGICAL HISTORY   has a past surgical history that includes Tear duct surgery (2006); Nasal septum surgery; Tubal ligation (age 26); Ganglion cyst excision (1980); Foot fracture surgery (2005); Knee arthroscopy w/ ACL reconstruction; Cholecystectomy (08/08/2012); Total vaginal hysterectomy (age 31); Hysterectomy; Cholecystectomy; Colonoscopy (N/A, 02/06/2017); Eye surgery (2005); and Cosmetic surgery (2024).     FAMILY HISTORY  family history includes Arthritis in her brother, daughter, father, paternal aunt, sister, sister, and sister; Bladder Cancer in her brother; COPD in her sister; Cancer in her brother, brother, brother, sister, and sister; Depression in her brother, brother, brother, mother, sister, and sister; Hearing loss in her brother, brother, brother, father, and sister; Heart disease in her father and sister; Hyperlipidemia in her brother, brother, father, and sister; Hypertension in her brother, mother, and sister; Kidney disease in her mother; Leukemia in her brother; Lymphoma in her sister; Mental illness in her brother and sister; Stroke in her father.     SOCIAL HISTORY   reports that she has never smoked. She has never used smokeless tobacco. She reports that she does not drink alcohol and does not use drugs.     ALLERGIES   Review of patient's allergies indicates:   Allergen Reactions    Cortisone Shortness Of Breath     Causes heart to race    Hair formula [mv,iron,min-folic acid-biotin]      dye    Levaquin [levofloxacin] Other (See Comments)     Tongue swelling and blisters    Codeine Nausea Only    Doxycycline      Other reaction(s): Unknown        MEDICATIONS   Current Outpatient Medications  on File Prior to Visit   Medication Sig Dispense Refill    aloe vera 25 mg Cap Take 25 mg by mouth once daily.      azelastine (ASTELIN) 137 mcg (0.1 %) nasal spray 1 spray (137 mcg total) by Nasal route once daily. 30 mL 5    cholecalciferol, vitamin D3, 125 mcg (5,000 unit) Tab Take 1 tablet by mouth every evening.      FLUoxetine 20 MG capsule Take 20 mg by mouth once daily.   2    fluticasone propionate (FLONASE) 50 mcg/actuation nasal spray 1 spray (50 mcg total) by Each Nostril route daily as needed for Rhinitis or Allergies. 9.9 mL 5    isosorbide mononitrate (IMDUR) 30 MG 24 hr tablet Take 2 tablets (60 mg total) by mouth once daily. 90 tablet 3    Lactobacillus rhamnosus GG (CULTURELLE) 10 billion cell capsule Take 1 capsule by mouth once daily.      levomefolate calcium (ELFOLATE) 15 mg Tab Take 15 mg by mouth once daily.      loratadine (CLARITIN) 10 mg tablet Take 10 mg by mouth once daily.      losartan-hydrochlorothiazide 100-25 mg (HYZAAR) 100-25 mg per tablet Take 1 tablet by mouth once daily. 90 tablet 3    MAGNESIUM L-THREONATE ORAL Take 144 mg by mouth.      mv-mn/folic acid/vit [id number] (ALIVE ONCE DAILY WOMEN 50 PLUS ORAL) Take 1 tablet by mouth once daily.      om 3/E/linol/ala/oleic/gla/lip (OMEGA 3-6-9 ORAL) Take by mouth.      potassium chloride (MICRO-K) 10 MEQ CpSR TAKE 1 CAPSULE BY MOUTH TWICE A  capsule 1    terazosin (HYTRIN) 2 MG capsule TAKE 1 CAPSULE (2 MG TOTAL) BY MOUTH EVERY EVENING 90 capsule 3    terazosin (HYTRIN) 5 MG capsule TAKE 1 CAPSULE (5 MG TOTAL) BY MOUTH EVERY EVENING. 90 capsule 3    TIROSINT 88 mcg Cap Take 88 mcg by mouth once daily.       turmeric, bulk, 100 % Powd Take 1 capsule by mouth once daily.      albuterol (PROVENTIL/VENTOLIN HFA) 90 mcg/actuation inhaler Inhale 2 puffs into the lungs every 4 (four) hours as needed for Wheezing. Rescue (Patient not taking: Reported on 11/14/2024) 8 g 1    ALBUTEROL INHL Inhale 1 puff into the lungs as needed.  (Patient not taking: Reported on 1/16/2025)      coenzyme Q10 100 mg capsule Place 100 mg under the tongue once daily. (Patient not taking: Reported on 1/16/2025)      semaglutide, weight loss, (WEGOVY) 0.25 mg/0.5 mL PnIj Inject 0.25 mg into the skin once a week. 4 mL 2     No current facility-administered medications on file prior to visit.          PHYSICAL EXAM   height is 5' (1.524 m) and weight is 86.7 kg (191 lb 2.2 oz).   Body mass index is 37.33 kg/m².      All other systems deferred.  GENERAL:  No acute distress  HABITUS: Obese  GAIT: Non-antalgic  SKIN: Normal     Left knee exam:  He has motion 0-120, tenderness along her lateral and medial joint line, positive Molina's test, pain with deep knee flexion.  No crepitus, no pain with manipulation of her patella.  No evidence of varus valgus instability.  No AP instability.  Negative Molina's test.  2+ pitting edema to her mid tibia    DATA:  Diagnostic tests reviewed for today's visit:     AP pelvis, hip, and lateral radiographs shows minimal narrowing of the superior joint space with sclerosis. The femoral neck is in netural position. The femoral head is spherical at superior margin. There is no signficant evidence of acetabular dysplasia and the femoral head remains covered.  DDD of lumbar spine observed.     Left knee radiographs show moderate tricompartmental joint space narrowing and marginal osteophytosis most notably about the lateral tibiofemoral joint space. Chondrocalcinosis.

## 2025-01-16 NOTE — TELEPHONE ENCOUNTER
----- Message from Carlos sent at 1/16/2025 11:43 AM CST -----  Regarding: Upcoming appointment  Pt has an upcoming appointment on 2/18/2025 at 1:40 pm, pt would like to be contacted about possibly getting an earlier appointment due to the result of a visit with Dr. Granados in Orthopedics.

## 2025-02-04 ENCOUNTER — TELEPHONE (OUTPATIENT)
Dept: CARDIOLOGY | Facility: CLINIC | Age: 80
End: 2025-02-04
Payer: MEDICARE

## 2025-02-11 ENCOUNTER — TELEPHONE (OUTPATIENT)
Dept: FAMILY MEDICINE | Facility: CLINIC | Age: 80
End: 2025-02-11
Payer: MEDICARE

## 2025-02-11 DIAGNOSIS — Z00.00 HEALTHCARE MAINTENANCE: ICD-10-CM

## 2025-02-11 DIAGNOSIS — R79.9 ABNORMAL FINDING OF BLOOD CHEMISTRY, UNSPECIFIED: ICD-10-CM

## 2025-02-11 DIAGNOSIS — E66.01 CLASS 2 SEVERE OBESITY DUE TO EXCESS CALORIES WITH SERIOUS COMORBIDITY AND BODY MASS INDEX (BMI) OF 35.0 TO 35.9 IN ADULT: Primary | ICD-10-CM

## 2025-02-11 DIAGNOSIS — E66.812 CLASS 2 SEVERE OBESITY DUE TO EXCESS CALORIES WITH SERIOUS COMORBIDITY AND BODY MASS INDEX (BMI) OF 35.0 TO 35.9 IN ADULT: Primary | ICD-10-CM

## 2025-02-11 NOTE — TELEPHONE ENCOUNTER
----- Message from Alexsandra sent at 2/11/2025 12:12 PM CST -----  Type: Patient Call Back    Who called: self     What is the request in detail: pt is requesting orders for an urine and A1C test before her appt on 2/18. Please call    Can the clinic reply by [facility]? No     Would the patient rather a call back or a response via [facility]?  call    Best call back number: .[phone] (home)      Additional Information:

## 2025-02-11 NOTE — TELEPHONE ENCOUNTER
Returned call to Iram who explained that she spoke with lab last week and labs. Nurse spoke with [facility] lab tech who explained that she has the labs orders but awaiting a diagnosis code from [facility].  also explained she has been going back and forward with [facility] since last week. Nurse explained above information to Iram. Iram stressed that the PA @ [facility] stated she sent diagnosis code with signature and both are circled. After assessing orders nurse verified that orders didn't have a signature or diagnosis code. Nurse also informed patient that once we receive needed items will give her a return call.

## 2025-02-11 NOTE — TELEPHONE ENCOUNTER
----- Message from Ita sent at 2/10/2025 11:25 AM CST -----  Regarding: self  Who called: self    What is the request in detail: pt was told she was getting a call in regards to labs being sent to clinic . She said she spoke with [facility] labs and they were supposed to call her last Friday    Can the clinic reply by [facility]? No    Would the patient rather a call back or a response via [facility]? Call back    Best call back number: [phone]      Additional Information:    Thank you.

## 2025-02-12 ENCOUNTER — LAB VISIT (OUTPATIENT)
Dept: LAB | Facility: HOSPITAL | Age: 80
End: 2025-02-12
Attending: INTERNAL MEDICINE
Payer: MEDICARE

## 2025-02-12 DIAGNOSIS — Z00.00 HEALTHCARE MAINTENANCE: ICD-10-CM

## 2025-02-12 DIAGNOSIS — E03.9 PRIMARY HYPOTHYROIDISM: Primary | ICD-10-CM

## 2025-02-12 DIAGNOSIS — E66.812 CLASS 2 SEVERE OBESITY DUE TO EXCESS CALORIES WITH SERIOUS COMORBIDITY AND BODY MASS INDEX (BMI) OF 35.0 TO 35.9 IN ADULT: ICD-10-CM

## 2025-02-12 DIAGNOSIS — R79.9 ABNORMAL FINDING OF BLOOD CHEMISTRY, UNSPECIFIED: ICD-10-CM

## 2025-02-12 DIAGNOSIS — E55.9 VITAMIN D DEFICIENCY: ICD-10-CM

## 2025-02-12 DIAGNOSIS — E66.01 CLASS 2 SEVERE OBESITY DUE TO EXCESS CALORIES WITH SERIOUS COMORBIDITY AND BODY MASS INDEX (BMI) OF 35.0 TO 35.9 IN ADULT: ICD-10-CM

## 2025-02-12 LAB
ALBUMIN SERPL BCP-MCNC: 3.9 G/DL (ref 3.5–5.2)
ALP SERPL-CCNC: 65 U/L (ref 40–150)
ALT SERPL W/O P-5'-P-CCNC: 20 U/L (ref 10–44)
ANION GAP SERPL CALC-SCNC: 11 MMOL/L (ref 8–16)
AST SERPL-CCNC: 25 U/L (ref 10–40)
BILIRUB SERPL-MCNC: 0.7 MG/DL (ref 0.1–1)
BILIRUB UR QL STRIP: NEGATIVE
BUN SERPL-MCNC: 16 MG/DL (ref 8–23)
CALCIUM SERPL-MCNC: 10 MG/DL (ref 8.7–10.5)
CHLORIDE SERPL-SCNC: 102 MMOL/L (ref 95–110)
CLARITY UR: CLEAR
CO2 SERPL-SCNC: 27 MMOL/L (ref 23–29)
COLOR UR: YELLOW
CREAT SERPL-MCNC: 1 MG/DL (ref 0.5–1.4)
EST. GFR  (NO RACE VARIABLE): 58 ML/MIN/1.73 M^2
GLUCOSE SERPL-MCNC: 99 MG/DL (ref 70–110)
GLUCOSE UR QL STRIP: NEGATIVE
HGB UR QL STRIP: NEGATIVE
KETONES UR QL STRIP: NEGATIVE
LEUKOCYTE ESTERASE UR QL STRIP: NEGATIVE
NITRITE UR QL STRIP: NEGATIVE
PH UR STRIP: 5 [PH] (ref 5–8)
POTASSIUM SERPL-SCNC: 3.6 MMOL/L (ref 3.5–5.1)
PROT SERPL-MCNC: 7.4 G/DL (ref 6–8.4)
PROT UR QL STRIP: NEGATIVE
SODIUM SERPL-SCNC: 140 MMOL/L (ref 136–145)
SP GR UR STRIP: 1.01 (ref 1–1.03)
T4 FREE SERPL-MCNC: 0.93 NG/DL (ref 0.71–1.51)
TSH SERPL DL<=0.005 MIU/L-ACNC: 5.5 UIU/ML (ref 0.4–4)
URN SPEC COLLECT METH UR: NORMAL
UROBILINOGEN UR STRIP-ACNC: NEGATIVE EU/DL

## 2025-02-12 PROCEDURE — 84439 ASSAY OF FREE THYROXINE: CPT | Mod: GA | Performed by: INTERNAL MEDICINE

## 2025-02-12 PROCEDURE — 80053 COMPREHEN METABOLIC PANEL: CPT | Performed by: INTERNAL MEDICINE

## 2025-02-12 PROCEDURE — 81003 URINALYSIS AUTO W/O SCOPE: CPT | Performed by: INTERNAL MEDICINE

## 2025-02-12 PROCEDURE — 82306 VITAMIN D 25 HYDROXY: CPT | Performed by: INTERNAL MEDICINE

## 2025-02-12 PROCEDURE — 83036 HEMOGLOBIN GLYCOSYLATED A1C: CPT | Performed by: INTERNAL MEDICINE

## 2025-02-12 PROCEDURE — 36415 COLL VENOUS BLD VENIPUNCTURE: CPT | Mod: PN | Performed by: INTERNAL MEDICINE

## 2025-02-12 PROCEDURE — 84443 ASSAY THYROID STIM HORMONE: CPT | Mod: GA | Performed by: INTERNAL MEDICINE

## 2025-02-13 LAB
ESTIMATED AVG GLUCOSE: 117 MG/DL (ref 68–131)
HBA1C MFR BLD: 5.7 % (ref 4–5.6)

## 2025-02-14 LAB — 25(OH)D3+25(OH)D2 SERPL-MCNC: 72 NG/ML (ref 30–96)

## 2025-02-18 ENCOUNTER — OFFICE VISIT (OUTPATIENT)
Dept: FAMILY MEDICINE | Facility: CLINIC | Age: 80
End: 2025-02-18
Payer: MEDICARE

## 2025-02-18 VITALS
HEIGHT: 60 IN | OXYGEN SATURATION: 96 % | WEIGHT: 194.44 LBS | BODY MASS INDEX: 38.18 KG/M2 | HEART RATE: 85 BPM | TEMPERATURE: 98 F | SYSTOLIC BLOOD PRESSURE: 160 MMHG | DIASTOLIC BLOOD PRESSURE: 74 MMHG

## 2025-02-18 DIAGNOSIS — E87.6 HYPOKALEMIA: ICD-10-CM

## 2025-02-18 DIAGNOSIS — E66.01 CLASS 2 SEVERE OBESITY DUE TO EXCESS CALORIES WITH SERIOUS COMORBIDITY AND BODY MASS INDEX (BMI) OF 35.0 TO 35.9 IN ADULT: ICD-10-CM

## 2025-02-18 DIAGNOSIS — I10 ESSENTIAL HYPERTENSION: Primary | ICD-10-CM

## 2025-02-18 DIAGNOSIS — E07.9 THYROID DYSFUNCTION: ICD-10-CM

## 2025-02-18 DIAGNOSIS — F41.8 DEPRESSION WITH ANXIETY: ICD-10-CM

## 2025-02-18 DIAGNOSIS — E53.8 FOLATE DEFICIENCY: ICD-10-CM

## 2025-02-18 DIAGNOSIS — R79.89 HIGH SERUM THYROID STIMULATING HORMONE (TSH): ICD-10-CM

## 2025-02-18 DIAGNOSIS — J30.2 SEASONAL ALLERGIES: ICD-10-CM

## 2025-02-18 DIAGNOSIS — Z63.8 STRESS DUE TO FAMILY TENSION: ICD-10-CM

## 2025-02-18 DIAGNOSIS — E66.812 CLASS 2 SEVERE OBESITY DUE TO EXCESS CALORIES WITH SERIOUS COMORBIDITY AND BODY MASS INDEX (BMI) OF 35.0 TO 35.9 IN ADULT: ICD-10-CM

## 2025-02-18 DIAGNOSIS — R73.03 PREDIABETES: ICD-10-CM

## 2025-02-18 DIAGNOSIS — E55.9 VITAMIN D DEFICIENCY: ICD-10-CM

## 2025-02-18 DIAGNOSIS — N18.31 CHRONIC KIDNEY DISEASE, STAGE 3A: ICD-10-CM

## 2025-02-18 DIAGNOSIS — E03.9 PRIMARY HYPOTHYROIDISM: ICD-10-CM

## 2025-02-18 PROCEDURE — 99215 OFFICE O/P EST HI 40 MIN: CPT | Mod: PBBFAC,PN | Performed by: INTERNAL MEDICINE

## 2025-02-18 RX ORDER — SEMAGLUTIDE 0.25 MG/.5ML
0.25 INJECTION, SOLUTION SUBCUTANEOUS WEEKLY
Qty: 2 ML | Refills: 0 | Status: SHIPPED | OUTPATIENT
Start: 2025-02-18 | End: 2025-03-20

## 2025-02-18 RX ORDER — TERAZOSIN 5 MG/1
5 CAPSULE ORAL NIGHTLY
Start: 2025-02-18

## 2025-02-18 RX ORDER — POTASSIUM CHLORIDE 750 MG/1
10 CAPSULE, EXTENDED RELEASE ORAL 2 TIMES DAILY
Start: 2025-02-18

## 2025-02-18 RX ORDER — SEMAGLUTIDE 1 MG/.5ML
1 INJECTION, SOLUTION SUBCUTANEOUS WEEKLY
Qty: 2 ML | Refills: 0 | Status: SHIPPED | OUTPATIENT
Start: 2025-04-21 | End: 2025-05-21

## 2025-02-18 RX ORDER — TERAZOSIN 2 MG/1
2 CAPSULE ORAL NIGHTLY
Start: 2025-02-18 | End: 2026-02-18

## 2025-02-18 RX ORDER — LORATADINE 10 MG/1
10 TABLET ORAL DAILY
Start: 2025-02-18

## 2025-02-18 RX ORDER — LEVOTHYROXINE SODIUM 88 UG/1
88 CAPSULE ORAL DAILY
Start: 2025-02-18

## 2025-02-18 RX ORDER — LEVOMEFOLATE CALCIUM 15 MG
15 TABLET ORAL DAILY
Start: 2025-02-18

## 2025-02-18 RX ORDER — FLUOXETINE HYDROCHLORIDE 20 MG/1
20 CAPSULE ORAL DAILY
Start: 2025-02-18

## 2025-02-18 RX ORDER — SEMAGLUTIDE 0.5 MG/.5ML
0.5 INJECTION, SOLUTION SUBCUTANEOUS WEEKLY
Qty: 2 ML | Refills: 0 | Status: SHIPPED | OUTPATIENT
Start: 2025-03-21 | End: 2025-04-20

## 2025-02-18 SDOH — SOCIAL DETERMINANTS OF HEALTH (SDOH): OTHER SPECIFIED PROBLEMS RELATED TO PRIMARY SUPPORT GROUP: Z63.8

## 2025-02-18 NOTE — PATIENT INSTRUCTIONS
Please call the psychiatry clinic to schedule an appointment.    [facility]: [phone]  [facility]: [phone]        Start Wegovy 0.25 mg once a week x 1 month. At the end of that month, the dose will continue to increase monthly.       Decrease portions as soon as you start wegovy. Avoid fried, rich or greasy foods.      Some nausea in the first 2 weeks is not unusual.     If you get pain across the upper abdomen and around to your back, please call the office.       [email or web address] to sign up for coupon card.        SEATED RESISTANCE BAND EXERCISES     If you do not have a resistance band, or do not feel comfortable using a resistance band, these exercises can also be done holding a light hand weight or water bottle.  If you are just starting to exercise, you may want to go through the motions without any weights or resistance till you become comfortable with the movements.     Do each of the movements shown 10 times (10 repetitions). You can repeat the exercises a second or  third time as well for greater benefit. The amount of tension on the resistance bands should be adjusted so  you can complete one set of 10 repetitions with effort. Increase the tension every few weeks. Do these  exercises 2 or 3 times a week.     * If an exercise hurts your back or joints, stop doing that particular exercise, but keep doing all the others *        CHEST EXERCISE          Start Position:   Sit tall, with feet shoulder width apart and feet in front of knees.   Belly pulled in.   Place the band around your upper back, grasp band in each hand, knuckles (rings) facing front. Arms  should be bent so that knuckles are in front of elbows   Slide shoulder blades down your back and slightly together (as if making a V).   Relax your neck.     To Perform This Exercise:   Press hands forward to lengthen arms using chest muscles (not arms!).   Dont arch your back!)   Return to start position and repeat 10 times.   Breathe!            BACK EXERCISE          Start Position:   Sit tall, with feet shoulder width apart and feet in front of knees.   Belly pulled in.   Grasp band in each hand and raise overhead. Arms should be slightly wider than shoulder width and no  slack in the band.   Slide shoulder blades down your back and slightly together (as if making a V).   Relax your neck.     To Perform This Exercise:   Open arms pulling down towards chest using upper back muscles (not arms!).   Squeeze through shoulder blades at the bottom of the movement (dont arch your back!).   Return to start position and repeat 10 times.   Breathe!           SHOULDER EXERCISE          Start Position:   Sit tall, with feet shoulder width apart and feet in front of knees.   Belly pulled in.   Sit on band so that you can grasp band in one hand with tension on the band, but not so much tension that  you cannot straighten the arm. It may take a few tries to find the right amount of tension.   Slide shoulder blades down your back and slightly together (as if making a V).   Relax your neck.     To Perform This Exercise:   Press fist to the ceiling, slightly in front of the body.   SLOWLY return to start position and repeat 10 times.   Switch sides and repeat on the other side.   Breathe!           TRICEPS EXERCISE          Start Position:   Sit tall, with feet shoulder width apart and feet in front of knees.   Belly pulled in.   Sit on one end of the band. Grasp other end of band in one hand.   Point elbow directly toward the ceiling (if this is difficult, you may support the upper arm with the opposite  hand)   Be sure there is no slack in the band in the starting position.   Slide shoulder blades down your back and slightly together (as if making a V).   Relax your neck.     To Perform This Exercise:   Extend your arm up to the ceiling, as shown.   Squeeze through shoulder blades at the bottom of the movement (dont arch your back!).   SLOWLY return to start  position and repeat 10 times.   Repeat on the other side.   Breathe!           BICEP EXERCISE          Start Position:   Sit tall, with feet shoulder width apart and feet in front of knees.   Belly pulled in.   Place center of exercise band under one foot and step the end of the band under the other foot.   Grasp each end of the band with one hand. Make sure there is no slack between your foot and the hand  that holds the band.   Hold your elbows to your sides.   Pull abdominals in, lift chest, press shoulders down and back.     To Perform This Exercise:   As you curl up, keep your wrist from changing position in relation to your forearm and your arm stable  from the shoulder to the elbow.   Bend and straighten your elbow in a slow and controlled movement. Repeat this motion 10 times.   Repeat on the other side.   Breathe!       Sample Mediterranean Meal Plan    A Mediterranean meal plan for weight loss focuses on nutrient-dense, whole foods that promote satiety while supporting metabolic health. It is rich in fruits, vegetables, whole grains, legumes, lean proteins, and healthy fats, particularly olive oil. The Mediterranean diet is known for its emphasis on heart health, reducing inflammation, and promoting sustainable weight loss. Here's a balanced, 7-day meal plan that follows these principles:    General Guidelines:  Portion Control: Focus on moderate portion sizes, especially for higher-calorie foods like nuts, oils, and grains.  Hydration: Drink plenty of water (aim for 8-10 cups daily) and limit sugary drinks.  Physical Activity: Incorporate regular physical activity, such as walking or light strength training, alongside the diet.  Limit Processed Foods: Avoid refined sugars, processed snacks, and red meat.    Day 1  Breakfast:  Greek yogurt with a handful of mixed berries, 1 tablespoon of cuba seeds, and a drizzle of honey.    Lunch:  Grilled chicken salad with mixed greens, tomatoes, cucumbers, red onions,  [place] olives, and feta cheese. Drizzle with olive oil and lemon juice.    Snack:  A small handful of raw almonds.    Dinner:  Baked salmon with a side of roasted vegetables (zucchini, bell peppers, and eggplant), and quinoa.    Day 2  Breakfast:  Whole grain toast or Demetrio toast with avocado, a poached egg, and a sprinkle of chili flakes.    Lunch:  Quinoa and chickpea bowl with spinach, cherry tomatoes, cucumber, red bell pepper, and a lemon-olive oil dressing.    Snack:  Sliced cucumber and hummus.    Dinner:  Grilled shrimp with a side of steamed broccoli and a small serving of whole grain couscous.    Day 3  Breakfast:  Oatmeal (prefer old fashioned or steel cut oats) made with unsweetened almond milk, topped with sliced strawberries and a sprinkle of cinnamon.    Lunch:  Mediterranean wrap: Whole wheat tortilla filled with grilled chicken, tzatziki, mixed greens, cucumber, and tomatoes.    Snack:  A small portion of mixed nuts (walnuts, almonds, pistachios).    Dinner:  Lentil soup with a side of mixed greens salad (olive oil and vinegar dressing) and a small piece of whole grain bread.    Day 4  Breakfast:  Scrambled eggs with spinach, tomatoes, and a small amount of feta cheese.    Lunch:  Grilled vegetable salad with roasted eggplant, zucchini, bell peppers, tomatoes, and a drizzle of olive oil, topped with a few slices of grilled chicken breast.    Snack:  Apple slices with almond butter.    Dinner:  Baked cod with garlic and lemon, served with a side of sautéed green beans and brown rice.    Day 5  Breakfast:  Smoothie made with spinach, banana, unsweetened almond milk, cuba seeds, and a handful of frozen berries.    Lunch:  Tuna salad (canned tuna in olive oil, mixed greens, tomatoes, red onion, and olives) dressed with lemon juice and olive oil.    Snack:  Carrot sticks with hummus.    Dinner:  Stuffed bell peppers with ground turkey, quinoa, tomatoes, and spices, served with a side of steamed  asparagus.    Day 6  Breakfast:  Whole grain toast or Demetrio toast with ricotta cheese, sliced figs, and a drizzle of honey.    Lunch:  Grilled chicken Caesar salad (use a light dressing made with Greek yogurt), served with a side of roasted sweet potatoes.    Snack:  A handful of fresh mixed berries.    Dinner:  Grilled lamb chops with a side of roasted [place] sprouts and a quinoa salad with tomatoes, cucumbers, and olive oil dressing.    Day 7  Breakfast:  Jose M pudding made with unsweetened almond milk, topped with fresh berries and a sprinkle of flaxseed.    Lunch:  Mediterranean bowl: Brown rice, grilled shrimp, cucumber, tomatoes, olives, red onion, and a drizzle of olive oil and lemon.    Snack:  A small portion of Greek yogurt with a teaspoon of honey.    Dinner:  Grilled chicken with a side of roasted cauliflower and a small serving of whole grain couscous.    Key Nutritional Points for Weight Loss:  Lean Proteins: Chicken, turkey, fish (especially fatty fish like salmon), and legumes (like lentils and chickpeas) help build muscle, promote satiety, and support metabolism.  Healthy Fats: Olive oil, nuts, and seeds are rich in heart-healthy fats that also help keep you full longer.  Fiber-Rich Vegetables & Fruits: These help with digestion, reduce hunger, and provide essential vitamins and minerals while being low in calories.  Whole Grains: Brown rice, quinoa, and whole wheat bread provide fiber and energy to fuel the body without causing blood sugar spikes.    This meal plan emphasizes nutrient-dense foods, portion control, and balanced meals, which are the core principles for sustainable weight loss on the Mediterranean diet.

## 2025-02-18 NOTE — PROGRESS NOTES
HISTORY OF PRESENT ILLNESS:  Iram Aguirre is a 79 y.o. female who presents to the clinic today for Follow-up    Last seen by me 9/2024.    She takes losartan/hydrochlorothiazide 100/25mg daily and terazosin 7mg (2mg + 5mg) nightly for blood pressure management. She reports taking isosorbide mononitrate 30mg daily instead of the prescribed 60mg dose. She admits to recently missing some medications due to caring for a new grandbaby, which may explain her elevated blood pressure.    She has a history of thyroid goiter without malignancy. Past psychiatric history includes depression with a suicide attempt at age 21.  She is managed with fluoxetine.    She takes levothyroxine 88mcg daily for thyroid, fluoxetine 20mg for depression, and levomefolate for folate deficiency. For allergies, she uses Astelin and Flonase nasal sprays, and generic Claritin. She discontinued albuterol inhaler due to it causing side effect of hyperactivity.    She takes aloe vera for indigestion, vitamin D3, coenzyme Q10, occasional morning probiotic, magnesium threonate, multivitamin (Alive for women over 50), and potassium chloride twice daily as prescribed by her cardiologist.    She recently initiated chair exercises and expresses willingness to incorporate walking into her routine.    She has been diagnosed with folate deficiency for at least five years, and feels may have contributed to her depression symptoms.    She notes increased stress due to family issues recently.      ROS:  General: -fever, -chills, -fatigue, -weight gain, -weight loss  Eyes: -vision changes, -redness, -discharge  ENT: -ear pain, -nasal congestion, -sore throat  Cardiovascular: -chest pain, -palpitations, -lower extremity edema  Respiratory: -cough, -shortness of breath  Gastrointestinal: -abdominal pain, -nausea, -vomiting, -diarrhea, -constipation, -blood in stool  Genitourinary: -dysuria, -hematuria, -frequency  Musculoskeletal: -joint pain, -muscle  pain  Skin: -rash, -lesion  Neurological: -headache, -dizziness, -numbness, -tingling  Psychiatric: -anxiety, +depression, +sleep difficulty             PAST MEDICAL HISTORY:  Past Medical History:   Diagnosis Date    AR (allergic rhinitis) 2012    Aspergillosis     1992    Benign hypertension     Depression     Diverticulosis 2005    Esophageal polyp     Fibromyalgia     Gastric ulcer; benign 2007    GERD (gastroesophageal reflux disease) 2012    Hypercholesteremia     Hypothyroidism     Kidney stones     MTHFR gene mutation     per daughter E72.8    Obstructive sleep apnea on CPAP     Osteopenia     Rectal polyp     Skin cancer     pre skin cancer skin    Vulvovaginal melanosis        PAST SURGICAL HISTORY:  Past Surgical History:   Procedure Laterality Date    CHOLECYSTECTOMY  2012    CHOLECYSTECTOMY      COLONOSCOPY N/A 2017    Procedure: COLONOSCOPY;  Surgeon: Arturo Crane MD;  Location: [facility];  Service: Endoscopy;  Laterality: N/A;    COSMETIC SURGERY      Eyelids lifted    EYE SURGERY      Tear duct    FOOT FRACTURE SURGERY      plate in right foot    GANGLION CYST EXCISION      left wrist    HYSTERECTOMY      KNEE ARTHROSCOPY W/ ACL RECONSTRUCTION      bilateral     NASAL SEPTUM SURGERY      TEAR DUCT SURGERY      left eye    TOTAL VAGINAL HYSTERECTOMY  age 31    w/BSO    TUBAL LIGATION  age 26       SOCIAL HISTORY:  Social History[1]    FAMILY HISTORY:  Family History   Problem Relation Name Age of Onset    Kidney disease Mother Anabela Macedo ( )     Depression Mother Anabela Macedo ( )     Hypertension Mother Anabela Macedo ( )     Heart disease Father Edjustice W Maradiaga III ( )     Arthritis Father Edjustice W Maradiaga III ( )     Hearing loss Father Edjustice W Maradiaga III ( )     Hyperlipidemia Father Edward W Maradiaga III ( )     Stroke Father Edjustice W Maradiaga III ( )     Lymphoma Sister Nora Zimmerman Carey Stapleton      Arthritis Sister Nora Patino Wordt     Heart disease Sister Nora Patino Wordfrankie     Bladder Cancer Brother Yonathan Maradiaga     Hearing loss Brother Yonathan Maradiaga     Leukemia Brother Edjustice Maradiaga IV     Arthritis Brother Edjustice Maradiaga IV     Cancer Brother Edjustice Maradiaga IV     Depression Brother Edjustice Maradiaga IV     Hearing loss Brother Edjustice Maradiaga IV     Hyperlipidemia Brother Edjustice Maradiaga IV     Mental illness Brother Edjustice Maradiaga IV     Cancer Brother Eric Maradiaga     Depression Brother Eric Maradiaga     Hearing loss Brother Eric Maradiaga     Hyperlipidemia Brother Eric Maradiaga     Hypertension Brother Eric Maradiaga     Arthritis Sister Blanca Ocampo ()     Cancer Sister Blanca Ocampo ()     Depression Sister Blanca Ocampo ()     Hypertension Sister Blanca Ocampo ()     Mental illness Sister Blanca Ocampo ()     Arthritis Sister Maria Del Rosario Paredes ()     Cancer Sister Maria Del Rosario Paredes ()     Depression Sister Maria Del Rosario Paredes ()     Hearing loss Sister Maria Del Rosario Paredes ()     Hyperlipidemia Sister Maria Del Rosario Paredes ()     Arthritis Daughter Monalisa Harris     Arthritis Paternal Aunt Meghna Gurrola     Cancer Brother Juan Manuel Maradiaga     Depression Brother Juan Manuel Maradiaga     COPD Sister Ita Maradiaga Jose Luis ( )        ALLERGIES AND MEDICATIONS: updated and reviewed.  Review of patient's allergies indicates:   Allergen Reactions    Cortisone Shortness Of Breath     Causes heart to race    Hair formula [mv,iron,min-folic acid-biotin]      dye    Levaquin [levofloxacin] Other (See Comments)     Tongue swelling and blisters    Codeine Nausea Only    Doxycycline      Other reaction(s): Unknown     Medication List with Changes/Refills   New Medications    SEMAGLUTIDE, WEIGHT LOSS, (WEGOVY) 0.25 MG/0.5 ML PNIJ    Inject 0.25 mg into the skin once a week.    SEMAGLUTIDE, WEIGHT LOSS, (WEGOVY) 0.5 MG/0.5 ML PNIJ    Inject 0.5 mg into the skin once a week.    SEMAGLUTIDE,  WEIGHT LOSS, (WEGOVY) 1 MG/0.5 ML PNIJ    Inject 1 mg into the skin once a week.   Current Medications    ALOE VERA 25 MG CAP    Take 25 mg by mouth once daily.    AZELASTINE (ASTELIN) 137 MCG (0.1 %) NASAL SPRAY    1 spray (137 mcg total) by Nasal route once daily.    CHOLECALCIFEROL, VITAMIN D3, 125 MCG (5,000 UNIT) TAB    Take 1 tablet by mouth every evening.    COENZYME Q10 100 MG CAPSULE    Place 100 mg under the tongue once daily.    FLUTICASONE PROPIONATE (FLONASE) 50 MCG/ACTUATION NASAL SPRAY    1 spray (50 mcg total) by Each Nostril route daily as needed for Rhinitis or Allergies.    ISOSORBIDE MONONITRATE (IMDUR) 30 MG 24 HR TABLET    Take 2 tablets (60 mg total) by mouth once daily.    LACTOBACILLUS RHAMNOSUS GG (CULTURELLE) 10 BILLION CELL CAPSULE    Take 1 capsule by mouth once daily.    LOSARTAN-HYDROCHLOROTHIAZIDE 100-25 MG (HYZAAR) 100-25 MG PER TABLET    Take 1 tablet by mouth once daily.    MAGNESIUM L-THREONATE ORAL    Take 144 mg by mouth.    MV-MN/FOLIC ACID/VIT [id number] (ALIVE ONCE DAILY WOMEN 50 PLUS ORAL)    Take 1 tablet by mouth once daily.    OM 3/E/LINOL/ALA/OLEIC/GLA/LIP (OMEGA 3-6-9 ORAL)    Take by mouth.    TURMERIC, BULK, 100 % POWD    Take 1 capsule by mouth once daily.   Changed and/or Refilled Medications    Modified Medication Previous Medication    FLUOXETINE 20 MG CAPSULE FLUoxetine 20 MG capsule       Take 1 capsule (20 mg total) by mouth once daily.    Take 20 mg by mouth once daily.    LEVOMEFOLATE CALCIUM (ELFOLATE) 15 MG TAB levomefolate calcium (ELFOLATE) 15 mg Tab       Take 15 mg by mouth once daily.    Take 15 mg by mouth once daily.    LORATADINE (CLARITIN) 10 MG TABLET loratadine (CLARITIN) 10 mg tablet       Take 1 tablet (10 mg total) by mouth once daily.    Take 10 mg by mouth once daily.    POTASSIUM CHLORIDE (MICRO-K) 10 MEQ CPSR potassium chloride (MICRO-K) 10 MEQ CpSR       Take 1 capsule (10 mEq total) by mouth 2 (two) times daily.    TAKE 1 CAPSULE BY MOUTH  TWICE A DAY    TERAZOSIN (HYTRIN) 2 MG CAPSULE terazosin (HYTRIN) 2 MG capsule       Take 1 capsule (2 mg total) by mouth every evening. Take 5 mg + 2 mg cap po nightly    TAKE 1 CAPSULE (2 MG TOTAL) BY MOUTH EVERY EVENING    TERAZOSIN (HYTRIN) 5 MG CAPSULE terazosin (HYTRIN) 5 MG capsule       Take 1 capsule (5 mg total) by mouth every evening. Take 5 mg + 2 mg cap po nightly    TAKE 1 CAPSULE (5 MG TOTAL) BY MOUTH EVERY EVENING.    TIROSINT 88 MCG CAP TIROSINT 88 mcg Cap       Take 88 mcg by mouth once daily.    Take 88 mcg by mouth once daily.    Discontinued Medications    ALBUTEROL (PROVENTIL/VENTOLIN HFA) 90 MCG/ACTUATION INHALER    Inhale 2 puffs into the lungs every 4 (four) hours as needed for Wheezing. Rescue    ALBUTEROL INHL    Inhale 1 puff into the lungs as needed.    SEMAGLUTIDE, WEIGHT LOSS, (WEGOVY) 0.25 MG/0.5 ML PNIJ    Inject 0.25 mg into the skin once a week.          CARE TEAM:  Patient Care Team:  Gato Spain MD as PCP - General (Internal Medicine)  Real Patrick MD as Consulting Physician (Cardiology)  Jeferson Bourne MD as Consulting Physician (Gastroenterology)  Bassem Day MD as Consulting Physician (Endocrinology)  Cheri Perez MD (Obstetrics and Gynecology)         PHYSICAL EXAM:   Vitals:    02/18/25 1418   BP: (!) 160/74   Pulse:    Temp:      Weight: 88.2 kg (194 lb 7.1 oz)   Height: 5' (152.4 cm)   Body mass index is 37.98 kg/m².    Physical Exam    General: No acute distress. Well-developed. Well-nourished.  Eyes: EOMI. Sclerae anicteric.  HENT: Normocephalic. Atraumatic. Nares patent. Moist oral mucosa.  Respiratory: Normal respiratory effort. Clear to auscultation bilaterally. No rales. No rhonchi. No wheezing.  Musculoskeletal: No  obvious deformity.  Extremities: No lower extremity edema.  Neurological: Alert & oriented x3. No slurred speech. Normal gait.  Psychiatric: Normal mood. Normal affect. Good insight. Good judgment.  Skin: Warm. Dry. No rash.              ASSESSMENT AND PLAN:  Assessment & Plan    IMPRESSION:  - Evaluated elevated blood pressure (162/74) in context of recent medication non-adherence and family stress  - Noted recent TSH abnormality, potentially due to thyroid medication adherence issues  - Observed A1c increase to prediabetes range  - Assessed mental health status given family stressors and current fluoxetine dosage  - Discussed potential use of Wegovy for weight management, considering patient's interest and insurance coverage    Essential hypertension  -     terazosin (HYTRIN) 5 MG capsule; Take 1 capsule (5 mg total) by mouth every evening. Take 5 mg + 2 mg cap po nightly  -     terazosin (HYTRIN) 2 MG capsule; Take 1 capsule (2 mg total) by mouth every evening. Take 5 mg + 2 mg cap po nightly    Stress due to family tension  -     Ambulatory referral/consult to Psychology; Future; Expected date: 02/25/2025    Depression with anxiety  -     FLUoxetine 20 MG capsule; Take 1 capsule (20 mg total) by mouth once daily.  -     Ambulatory referral/consult to Psychology; Future; Expected date: 02/25/2025    Chronic kidney disease, stage 3a       - Avoid NSAID medication.    Prediabetes  -     T4, Free; Future; Expected date: 04/18/2025  -     TSH; Future; Expected date: 04/18/2025    Class 2 severe obesity due to excess calories with serious comorbidity and body mass index (BMI) of 35.0 to 35.9 in adult  -     semaglutide, weight loss, (WEGOVY) 0.25 mg/0.5 mL PnIj; Inject 0.25 mg into the skin once a week.  Dispense: 2 mL; Refill: 0  -     semaglutide, weight loss, (WEGOVY) 0.5 mg/0.5 mL PnIj; Inject 0.5 mg into the skin once a week.  Dispense: 2 mL; Refill: 0  -     semaglutide, weight loss, (WEGOVY) 1 mg/0.5 mL PnIj; Inject 1 mg into the skin once a week.  Dispense: 2 mL; Refill: 0    Primary hypothyroidism  -     T4, FREE  -     TSH  -     TIROSINT 88 mcg Cap; Take 88 mcg by mouth once daily.    Thyroid dysfunction    High serum thyroid  stimulating hormone (TSH)  -     T4, Free; Future; Expected date: 04/18/2025  -     TSH; Future; Expected date: 04/18/2025    Folate deficiency  -     levomefolate calcium (ELFOLATE) 15 mg Tab; Take 15 mg by mouth once daily.    Vitamin D deficiency  -     Misc Sendout Test, Blood vitamin D    Seasonal allergies  -     loratadine (CLARITIN) 10 mg tablet; Take 1 tablet (10 mg total) by mouth once daily.    Hypokalemia  -     potassium chloride (MICRO-K) 10 MEQ CpSR; Take 1 capsule (10 mEq total) by mouth 2 (two) times daily.    - Prescribed Wegovy (semaglutide) for obesity management.  - Explained mechanism of action: increased hormone production reducing appetite, slowed gastric emptying, and reduced appetite signaling from brain.  - Discussed potential side effects: nausea, indigestion, upset stomach, changes in bowel movements, constipation, and rare but serious risk of pancreatitis.  - Informed about animal studies linking similar medications to thyroid tumors.    - Continued potassium chloride twice daily, as prescribed by the patient's cardiologist.    - Increased isosorbide mononitrate to 60mg daily (two 30mg tablets daily) as originally prescribed.  - Current blood pressure is 162/74, which is considered high.  - Instructed to recheck blood pressure with nurse; return for blood pressure visit if still elevated.  - Advised to call digital medicine program to check eligibility for blood pressure monitoring.  - Continued losartan-hydrochlorothiazide and terazosin 7 mg (5 mg + 2 mg) nightly for blood pressure management.    - Noted patient has been missing some medications recently due to schedule changes with a new grandchild.    - A1c has increased in the prediabetes range.  - Advised staying away from added sugars.  - Provided a sample Mediterranean style meal plan.  - Suggested lifestyle changes including diet modifications and exercise.    - Continued Tirosint daily for thyroid.  - Recent labs from February  show TSH was slightly off.  - Recommend rechecking TSH in 2 months to ensure it returns to normal.  - Noted patient has an upcoming appointment with noa.  - Iram denies personal history of thyroid cancer.    - Continued fluoxetine (Prozac) 20mg daily for both depression and anxiety.  - Referred to [facility] therapist for mental health support; instructed to contact [facility] to schedule therapy appointment.  - Confirmed patient is not currently in any unsafe situation or experiencing abuse.    - Continued Astelin and Flonase nasal sprays, and Claritin (generic) for allergies.    - Iram to increase physical activity: Continue chair exercises and start walking.  - Perform seated resistance band exercises at least twice weekly.    - Iram to follow Mediterranean style eating plan.    - Blood work ordered in 2 months.                   Follow up 3 months or sooner as needed.    This note was generated with the assistance of ambient listening technology. Verbal consent was obtained by the patient and accompanying visitor(s) for the recording of patient appointment to facilitate this note. I attest to having reviewed and edited the generated note for accuracy, though some syntax or spelling errors may persist. Please contact the author of this note for any clarification.         [1]   Social History  Socioeconomic History    Marital status:     Number of children: 3   Occupational History    Occupation: teacher, phone  - retired   Tobacco Use    Smoking status: Never    Smokeless tobacco: Never   Substance and Sexual Activity    Alcohol use: No    Drug use: No    Sexual activity: Not Currently     Partners: Male     Birth control/protection: None     Social Drivers of Health     Financial Resource Strain: Low Risk  (1/13/2025)    Overall Financial Resource Strain (CARDIA)     Difficulty of Paying Living Expenses: Not very hard   Food Insecurity: Food Insecurity Present (1/13/2025)    Hunger Vital Sign      Worried About Running Out of Food in the Last Year: Sometimes true     Ran Out of Food in the Last Year: Sometimes true   Transportation Needs: No Transportation Needs (12/4/2023)    PRAPARE - Transportation     Lack of Transportation (Medical): No     Lack of Transportation (Non-Medical): No   Physical Activity: Inactive (1/13/2025)    Exercise Vital Sign     Days of Exercise per Week: 0 days     Minutes of Exercise per Session: 0 min   Stress: No Stress Concern Present (1/13/2025)    [employer] [place] [employer] - Occupational Stress Questionnaire     Feeling of Stress : Not at all   Housing Stability: Low Risk  (12/4/2023)    Housing Stability Vital Sign     Unable to Pay for Housing in the Last Year: No     Number of Places Lived in the Last Year: 1     Unstable Housing in the Last Year: No

## 2025-02-20 ENCOUNTER — TELEPHONE (OUTPATIENT)
Dept: FAMILY MEDICINE | Facility: CLINIC | Age: 80
End: 2025-02-20
Payer: MEDICARE

## 2025-02-20 NOTE — TELEPHONE ENCOUNTER
----- Message from Med Assistant Byers sent at 2/20/2025  8:46 AM CST -----  Who called:Pt What is the request in detail:Please fax lab orders to [facility] today Can the clinic reply by [facility]? NoNo Would the patient rather a call back or a response via [facility]? Call backCallback Best call back number:Telephone Information:Mobile          [phone] Additional Information:Fax : [phone]  Kevin Wiggins Office number :[phone] is today at 1:30 pm Thank you.

## 2025-02-20 NOTE — TELEPHONE ENCOUNTER
Contacted Iram who is requesting lab results not orders. Patient stated that Suly JIMENEZ uses [facility] and she do not understand why they can not see the results. Nurse explained that 1. In order to send any medical results/records she the patient would have to fill out a medical release form. 2. Suly JIMENEZ can request the results. However, either way 7 days is needed to response. Also, explained Gato Spain MD is not in office today. Nurse recommend patient to reach out to Suly per lab results sense both use [facility]. Patient verbalized understanding.

## 2025-02-20 NOTE — TELEPHONE ENCOUNTER
Attempt to contact [facility]. No answer. Left voice message per Gato Spain MD not in office and for any orders or other request it is a 72 hour turn around.

## 2025-02-20 NOTE — TELEPHONE ENCOUNTER
----- Message from Med Assistant Byers sent at 2/20/2025 11:46 AM CST -----  Type:  Patient Returning CallWho Called:Pt Who Left Message for Patient:Inessa Oshea LPNDoes the patient know what this is regarding?:Would the patient rather a call back or a response via [facility]?  Call backCallback Best Call Back Number:Telephone Information:Mobile          [phone] Additional Information:Please print lab reqs from 2/18 and fax to hallie Medina has apt today Thank you.

## 2025-03-10 ENCOUNTER — CLINICAL SUPPORT (OUTPATIENT)
Dept: FAMILY MEDICINE | Facility: CLINIC | Age: 80
End: 2025-03-10
Payer: MEDICARE

## 2025-03-10 VITALS — HEART RATE: 87 BPM | OXYGEN SATURATION: 97 % | DIASTOLIC BLOOD PRESSURE: 59 MMHG | SYSTOLIC BLOOD PRESSURE: 130 MMHG

## 2025-03-10 DIAGNOSIS — I10 ESSENTIAL HYPERTENSION: Primary | ICD-10-CM

## 2025-03-10 PROCEDURE — 99212 OFFICE O/P EST SF 10 MIN: CPT | Mod: PBBFAC,PN

## 2025-03-10 PROCEDURE — 99499 UNLISTED E&M SERVICE: CPT | Mod: S$PBB,,, | Performed by: INTERNAL MEDICINE

## 2025-03-10 PROCEDURE — 99999 PR PBB SHADOW E&M-EST. PATIENT-LVL II: CPT | Mod: PBBFAC,,,

## 2025-03-10 NOTE — PROGRESS NOTES
Iram Aguirre 79 y.o. female is here today for Blood Pressure check.   History of HTN yes.    Review of patient's allergies indicates:   Allergen Reactions    Cortisone Shortness Of Breath     Causes heart to race    Hair formula [mv,iron,min-folic acid-biotin]      dye    Levaquin [levofloxacin] Other (See Comments)     Tongue swelling and blisters    Codeine Nausea Only    Doxycycline      Other reaction(s): Unknown     Creatinine   Date Value Ref Range Status   02/12/2025 1.0 0.5 - 1.4 mg/dL Final   08/01/2012 0.7 0.2 - 1.4 mg/dl Final     Sodium   Date Value Ref Range Status   02/12/2025 140 136 - 145 mmol/L Final     Potassium   Date Value Ref Range Status   02/12/2025 3.6 3.5 - 5.1 mmol/L Final   ]  Patient verifies taking blood pressure medications on a regular basis at the same time of the day.   Current Medications[1]  Does patient have record of home blood pressure readings no. Readings have been averaging none.   Last dose of blood pressure medication was taken at 1130.  Patient is asymptomatic.   Complains of none.    BP: (!) 130/59 , Pulse: 87 .      Dr. Spain will  be notified.           [1]   Current Outpatient Medications:     aloe vera 25 mg Cap, Take 25 mg by mouth once daily., Disp: , Rfl:     azelastine (ASTELIN) 137 mcg (0.1 %) nasal spray, 1 spray (137 mcg total) by Nasal route once daily., Disp: 30 mL, Rfl: 5    cholecalciferol, vitamin D3, 125 mcg (5,000 unit) Tab, Take 1 tablet by mouth every evening., Disp: , Rfl:     coenzyme Q10 100 mg capsule, Place 100 mg under the tongue once daily., Disp: , Rfl:     FLUoxetine 20 MG capsule, Take 1 capsule (20 mg total) by mouth once daily., Disp: , Rfl:     fluticasone propionate (FLONASE) 50 mcg/actuation nasal spray, 1 spray (50 mcg total) by Each Nostril route daily as needed for Rhinitis or Allergies., Disp: 9.9 mL, Rfl: 5    isosorbide mononitrate (IMDUR) 30 MG 24 hr tablet, Take 2 tablets (60 mg total) by mouth once daily., Disp: 90 tablet,  Rfl: 3    Lactobacillus rhamnosus GG (CULTURELLE) 10 billion cell capsule, Take 1 capsule by mouth once daily., Disp: , Rfl:     levomefolate calcium (ELFOLATE) 15 mg Tab, Take 15 mg by mouth once daily., Disp: , Rfl:     loratadine (CLARITIN) 10 mg tablet, Take 1 tablet (10 mg total) by mouth once daily., Disp: , Rfl:     losartan-hydrochlorothiazide 100-25 mg (HYZAAR) 100-25 mg per tablet, Take 1 tablet by mouth once daily., Disp: 90 tablet, Rfl: 3    MAGNESIUM L-THREONATE ORAL, Take 144 mg by mouth., Disp: , Rfl:     mv-mn/folic acid/vit [id number] (ALIVE ONCE DAILY WOMEN 50 PLUS ORAL), Take 1 tablet by mouth once daily., Disp: , Rfl:     om 3/E/linol/ala/oleic/gla/lip (OMEGA 3-6-9 ORAL), Take by mouth., Disp: , Rfl:     potassium chloride (MICRO-K) 10 MEQ CpSR, Take 1 capsule (10 mEq total) by mouth 2 (two) times daily., Disp: , Rfl:     semaglutide, weight loss, (WEGOVY) 0.25 mg/0.5 mL PnIj, Inject 0.25 mg into the skin once a week., Disp: 2 mL, Rfl: 0    [START ON 3/21/2025] semaglutide, weight loss, (WEGOVY) 0.5 mg/0.5 mL PnIj, Inject 0.5 mg into the skin once a week., Disp: 2 mL, Rfl: 0    [START ON 4/21/2025] semaglutide, weight loss, (WEGOVY) 1 mg/0.5 mL PnIj, Inject 1 mg into the skin once a week., Disp: 2 mL, Rfl: 0    terazosin (HYTRIN) 2 MG capsule, Take 1 capsule (2 mg total) by mouth every evening. Take 5 mg + 2 mg cap po nightly, Disp: , Rfl:     terazosin (HYTRIN) 5 MG capsule, Take 1 capsule (5 mg total) by mouth every evening. Take 5 mg + 2 mg cap po nightly, Disp: , Rfl:     TIROSINT 88 mcg Cap, Take 88 mcg by mouth once daily., Disp: , Rfl:     turmeric, bulk, 100 % Powd, Take 1 capsule by mouth once daily., Disp: , Rfl:

## 2025-04-02 DIAGNOSIS — M17.12 PRIMARY OSTEOARTHRITIS OF LEFT KNEE: Primary | ICD-10-CM

## 2025-04-04 ENCOUNTER — APPOINTMENT (OUTPATIENT)
Dept: RADIOLOGY | Facility: HOSPITAL | Age: 80
End: 2025-04-04
Attending: ORTHOPAEDIC SURGERY
Payer: MEDICARE

## 2025-04-04 ENCOUNTER — OFFICE VISIT (OUTPATIENT)
Dept: ORTHOPEDICS | Facility: CLINIC | Age: 80
End: 2025-04-04
Payer: MEDICARE

## 2025-04-04 VITALS — HEIGHT: 60 IN | BODY MASS INDEX: 38.18 KG/M2 | WEIGHT: 194.44 LBS

## 2025-04-04 DIAGNOSIS — M17.12 PRIMARY OSTEOARTHRITIS OF LEFT KNEE: ICD-10-CM

## 2025-04-04 DIAGNOSIS — M70.62 TROCHANTERIC BURSITIS OF LEFT HIP: Primary | ICD-10-CM

## 2025-04-04 PROCEDURE — 73502 X-RAY EXAM HIP UNI 2-3 VIEWS: CPT | Mod: TC,FY,PN,LT

## 2025-04-04 PROCEDURE — 73564 X-RAY EXAM KNEE 4 OR MORE: CPT | Mod: TC,FY,PN,LT

## 2025-04-04 PROCEDURE — 99213 OFFICE O/P EST LOW 20 MIN: CPT | Mod: PBBFAC,25,PN | Performed by: ORTHOPAEDIC SURGERY

## 2025-04-04 PROCEDURE — 73502 X-RAY EXAM HIP UNI 2-3 VIEWS: CPT | Mod: 26,LT,, | Performed by: RADIOLOGY

## 2025-04-04 PROCEDURE — 99999 PR PBB SHADOW E&M-EST. PATIENT-LVL III: CPT | Mod: PBBFAC,,, | Performed by: ORTHOPAEDIC SURGERY

## 2025-04-04 PROCEDURE — 73564 X-RAY EXAM KNEE 4 OR MORE: CPT | Mod: 26,LT,, | Performed by: RADIOLOGY

## 2025-04-04 RX ORDER — TRIAMCINOLONE ACETONIDE 40 MG/ML
40 INJECTION, SUSPENSION INTRA-ARTICULAR; INTRAMUSCULAR
Status: DISCONTINUED | OUTPATIENT
Start: 2025-04-04 | End: 2025-04-04 | Stop reason: HOSPADM

## 2025-04-04 RX ADMIN — TRIAMCINOLONE ACETONIDE 40 MG: 40 INJECTION, SUSPENSION INTRA-ARTICULAR; INTRAMUSCULAR at 11:04

## 2025-04-04 NOTE — PROGRESS NOTES
EST PATIENT ORTHOPAEDIC: HIP    PRIMARY CARE PHYSICIAN: Gato Spain MD   REFERRING PROVIDER: No referring provider defined for this encounter.     ASSESSMENT & PLAN:    Impression:  Lumbar Radiculopathy  DDD Lumbar Spine  Left hip trochanteric bursitis    Left Knee Moderate Osteoarthritis    Follow Up Plan: PRN    Non operative care:    Iram Aguirre has physical exam evidence of above and wishes to pursue an non-operative care. I am recommending the following: continued follow up with pain management, left hip trochanteric bursa injection    To review:  Patient comes in with a previous history of having bilateral lower back and hip pain but worse on the left.  She reports a posterior and lateral based hip pain that can extend when she is lying down at night to her knee but not past it.  She also has some lateral compartment arthritis on the left knee that is being treated with viscosupplementation injections, last of which was 2 months ago with another ortho provider.  She did not have significant relief after that last injection but these did work for her previously.  She had never tried steroid injections.  She associates this with being told in the past that steroids caused increased shortness of breath.  I believe this to be oral based steroids and not the knee injections based on further discussion.  I think she should be able to tolerate knee injections and she is not a diabetic.  I do not have any other contraindications that I can determine why she would not benefit from this treatment modality.  So we did a left knee steroid injection about 4 months ago.  That provided her good relief.  She is having some return of her knee pain today but not as severe as previous.  She does not request a intra-articular knee injection today.     Today she is really discussing her back and leg pain.  This became exacerbated by a fall she sustained about 2 weeks ago.  She actually landed onto her right side.  She  did have some swelling to her right ankle I am in the initial aftermath of this fall but was not having exacerbation of left-sided leg symptoms.  Then over the remaining weeks she had began to be more debilitated with regard to her left side.  She localizes this to the back and hip primarily less so to the knee.  She does have trochanteric bursitis in his tender on that side of her hip.  She has been able to ambulate with the use of assistive devices over the last few days.  I do think a component of this may be an exacerbation of her lumbar spine but for what is the bursitis pain I offered her an injection today.  She would like to proceed with that.  If ongoing pain in his leg and hip I think next step would be following up with pain management. Her repeat hip radiographs as well as knee x-rays do not demonstrate any acute process.  She does not have any pain with internal or external rotation of her hip.     The patient has been ordered:  Left hip steroid injection    CONSULTS:   None    ACTIVE PROBLEM LIST  Patient Active Problem List   Diagnosis    Gallstones    Hypothyroidism    Hypercholesteremia    Osteopenia    Obstructive sleep apnea on CPAP    Vitamin D deficiency disease    Fibromyalgia    Rectal polyp    AR (allergic rhinitis)    GERD (gastroesophageal reflux disease)    Vulvovaginal melanosis    Hx of colonic polyps    Chest pain    Anxiety    Essential hypertension    Statin intolerance    Respiratory tract infection due to COVID-19 virus    Atypical chest pain    Cervical neuropathy    Thyroid dysfunction    Chronic low back pain with sciatica    Dyspnea on exertion    Hyperglycemia    Abnormal electrocardiogram (ECG) (EKG)    Nonrheumatic aortic valve stenosis    Bilateral carotid bruits    Vitreous degeneration of both eyes    Pseudophakia of both eyes    Dry eye syndrome of both eyes    Cervical spondylosis    DDD (degenerative disc disease), cervical    Lumbar spondylosis    DDD (degenerative  disc disease), lumbar    Stable angina    Atherosclerotic heart disease of native coronary artery with unstable angina pectoris    Chronic kidney disease, stage 3a           SUBJECTIVE    CHIEF COMPLAINT: Hip Pain    HPI:   Iram Aguirre is a 79 y.o. female here for evaluation and management of left hip pain. There is not a specific incident that brought about this pain. she has had progressive problems with the hip(s) starting 1 years ago and is progressing which is now interfering with activities which include: walking and functional household ADL's    Currently the pain in the joint is moderate with activity.  The pain is intermittent and is located in buttocks and lateral hip.  The pain is described as aching, radiating, and shooting . Relieving factors include rest, over the counter medication , and repositioning. No associated Catching, Clicking, and Popping.     They do not report leg length inequality.     Iram Aguirre has no additional complaints.     7/25/24: Here for follow up of ongoing bilateral leg pains and left knee pain.     1/16/25:  Patient here today with return of left knee pain.  Previous injection she tolerated well.  It provided her good relief.  Helping with her knee also help alleviate some of her lower back pain.  She unfortunately had 2 different falls that resulted in exacerbation of her left knee pain.  She does not have any instability.  She does have some complaints of swelling in her ankle, not previously evaluated.  With regard to the knee specifically I think she is asking for repeat injection.    4/4/25:  Patient comes in today 2 weeks after a fall landing onto her right side now not with right-sided pain but with some residual left-sided discomfort.  She has pre-existing history of lumbar spine issues and radiculopathy which has been exacerbated I think by this fall.  She is also having new onset trochanteric bursitis.  Her left knee pain is relatively stable compared to  previous exam.  She has new radiographs today.    PROGRESSIVE SYMPTOMS:  Pain impacting sleep  Pain worsened by weight bearing  Pain effecting living situation    FUNCTIONAL STATUS:   Climb a flight of stairs or walk up a hill     PREVIOUS TREATMENTS:  Medical: OTC NSAIDS and Tylenol  Physical Therapy: Activities Modified   Previous Orthopaedic Surgery: None    REVIEW OF SYSTEMS:  PAIN ASSESSMENT:  See HPI.  MUSCULOSKELETAL: See HPI.  OTHER 10 point review of systems is negative except as stated in HPI above    PAST MEDICAL HISTORY   has a past medical history of AR (allergic rhinitis) (11/29/2012), Aspergillosis, Benign hypertension, Depression, Diverticulosis (2005), Esophageal polyp, Fibromyalgia, Gastric ulcer; benign (2007), GERD (gastroesophageal reflux disease) (11/29/2012), Hypercholesteremia, Hypothyroidism, Kidney stones, MTHFR gene mutation, Obstructive sleep apnea on CPAP, Osteopenia, Rectal polyp, Skin cancer, and Vulvovaginal melanosis.     PAST SURGICAL HISTORY   has a past surgical history that includes Tear duct surgery (2006); Nasal septum surgery; Tubal ligation (age 26); Ganglion cyst excision (1980); Foot fracture surgery (2005); Knee arthroscopy w/ ACL reconstruction; Cholecystectomy (08/08/2012); Total vaginal hysterectomy (age 31); Hysterectomy; Cholecystectomy; Colonoscopy (N/A, 02/06/2017); Eye surgery (2005); and Cosmetic surgery (2024).     FAMILY HISTORY  family history includes Arthritis in her brother, daughter, father, paternal aunt, sister, sister, and sister; Bladder Cancer in her brother; COPD in her sister; Cancer in her brother, brother, brother, sister, and sister; Depression in her brother, brother, brother, mother, sister, and sister; Hearing loss in her brother, brother, brother, father, and sister; Heart disease in her father and sister; Hyperlipidemia in her brother, brother, father, and sister; Hypertension in her brother, mother, and sister; Kidney disease in her mother;  Leukemia in her brother; Lymphoma in her sister; Mental illness in her brother and sister; Stroke in her father.     SOCIAL HISTORY   reports that she has never smoked. She has never used smokeless tobacco. She reports that she does not drink alcohol and does not use drugs.     ALLERGIES   Review of patient's allergies indicates:   Allergen Reactions    Cortisone Shortness Of Breath     Causes heart to race    Hair formula [mv,iron,min-folic acid-biotin]      dye    Levaquin [levofloxacin] Other (See Comments)     Tongue swelling and blisters    Codeine Nausea Only    Doxycycline      Other reaction(s): Unknown        MEDICATIONS   Current Outpatient Medications on File Prior to Visit   Medication Sig Dispense Refill    azelastine (ASTELIN) 137 mcg (0.1 %) nasal spray 1 spray (137 mcg total) by Nasal route once daily. 30 mL 5    cholecalciferol, vitamin D3, 125 mcg (5,000 unit) Tab Take 1 tablet by mouth every evening.      coenzyme Q10 100 mg capsule Place 100 mg under the tongue once daily.      FLUoxetine 20 MG capsule Take 1 capsule (20 mg total) by mouth once daily.      fluticasone propionate (FLONASE) 50 mcg/actuation nasal spray 1 spray (50 mcg total) by Each Nostril route daily as needed for Rhinitis or Allergies. 9.9 mL 5    isosorbide mononitrate (IMDUR) 30 MG 24 hr tablet Take 2 tablets (60 mg total) by mouth once daily. 90 tablet 3    Lactobacillus rhamnosus GG (CULTURELLE) 10 billion cell capsule Take 1 capsule by mouth once daily.      levomefolate calcium (ELFOLATE) 15 mg Tab Take 15 mg by mouth once daily.      loratadine (CLARITIN) 10 mg tablet Take 1 tablet (10 mg total) by mouth once daily.      losartan-hydrochlorothiazide 100-25 mg (HYZAAR) 100-25 mg per tablet Take 1 tablet by mouth once daily. 90 tablet 3    MAGNESIUM L-THREONATE ORAL Take 144 mg by mouth.      mv-mn/folic acid/vit [id number]/dmsg434 (ALIVE ONCE DAILY WOMEN 50 PLUS ORAL) Take 1 tablet by mouth once daily.      om  3/E/linol/ala/oleic/gla/lip (OMEGA 3-6-9 ORAL) Take by mouth.      potassium chloride (MICRO-K) 10 MEQ CpSR Take 1 capsule (10 mEq total) by mouth 2 (two) times daily.      [START ON 4/21/2025] semaglutide, weight loss, (WEGOVY) 1 mg/0.5 mL PnIj Inject 1 mg into the skin once a week. 2 mL 0    terazosin (HYTRIN) 2 MG capsule Take 1 capsule (2 mg total) by mouth every evening. Take 5 mg + 2 mg cap po nightly      terazosin (HYTRIN) 5 MG capsule Take 1 capsule (5 mg total) by mouth every evening. Take 5 mg + 2 mg cap po nightly      TIROSINT 88 mcg Cap Take 88 mcg by mouth once daily.      turmeric, bulk, 100 % Powd Take 1 capsule by mouth once daily.      aloe vera 25 mg Cap Take 25 mg by mouth once daily.      semaglutide, weight loss, (WEGOVY) 0.5 mg/0.5 mL PnIj Inject 0.5 mg into the skin once a week. 2 mL 0     No current facility-administered medications on file prior to visit.          PHYSICAL EXAM   height is 5' (1.524 m) and weight is 88.2 kg (194 lb 7.1 oz).   Body mass index is 37.98 kg/m².      All other systems deferred.  GENERAL:  No acute distress  HABITUS: Obese  GAIT: Non-antalgic  SKIN: Normal   Left hip exam:  No significant pain with internal or external rotation of her hip.  There is tenderness along the trochanteric bursa extending into her gluteal tendons.  Left knee exam:  He has motion 0-120, tenderness along her lateral and medial joint line, positive Molina's test, pain with deep knee flexion.  No crepitus, no pain with manipulation of her patella.  No evidence of varus valgus instability.  No AP instability.  Negative Molina's test.  2+ pitting edema to her mid tibia    DATA:  Diagnostic tests reviewed for today's visit:     AP pelvis, hip, and lateral radiographs shows minimal narrowing of the superior joint space with sclerosis. The femoral neck is in netural position. The femoral head is spherical at superior margin. There is no signficant evidence of acetabular dysplasia and the  femoral head remains covered.  DDD of lumbar spine observed.     Left knee radiographs show moderate tricompartmental joint space narrowing and marginal osteophytosis most notably about the lateral tibiofemoral joint space. Chondrocalcinosis.

## 2025-04-04 NOTE — PROCEDURES
Large Joint Aspiration/Injection: L greater trochanteric bursa    Date/Time: 4/4/2025 11:20 AM    Performed by: Kapil Granados MD  Authorized by: Kapil Granados MD    Consent Done?:  Yes (Verbal)  Indications:  Pain  Prep: patient was prepped and draped in usual sterile fashion      Local anesthesia used?: Yes    Anesthesia:  Local infiltration  Local anesthetic:  Topical anesthetic and lidocaine 1% without epinephrine    Details:  Needle Size:  22 G  Location:  Hip  Site:  L greater trochanteric bursa  Medications:  40 mg triamcinolone acetonide 40 mg/mL  Patient tolerance:  Patient tolerated the procedure well with no immediate complications

## 2025-04-16 DIAGNOSIS — E87.6 HYPOKALEMIA: ICD-10-CM

## 2025-04-16 RX ORDER — POTASSIUM CHLORIDE 750 MG/1
10 CAPSULE, EXTENDED RELEASE ORAL 2 TIMES DAILY
Qty: 180 CAPSULE | Refills: 1 | Status: SHIPPED | OUTPATIENT
Start: 2025-04-16

## 2025-04-21 ENCOUNTER — LAB VISIT (OUTPATIENT)
Dept: LAB | Facility: HOSPITAL | Age: 80
End: 2025-04-21
Attending: INTERNAL MEDICINE
Payer: MEDICARE

## 2025-04-21 DIAGNOSIS — R79.89 HIGH SERUM THYROID STIMULATING HORMONE (TSH): ICD-10-CM

## 2025-04-21 DIAGNOSIS — R73.03 PREDIABETES: ICD-10-CM

## 2025-04-21 LAB
T4 FREE SERPL-MCNC: 0.91 NG/DL (ref 0.71–1.51)
T4 FREE SERPL-MCNC: 0.92 NG/DL (ref 0.71–1.51)
TSH SERPL-ACNC: 7.29 UIU/ML (ref 0.4–4)

## 2025-04-21 PROCEDURE — 36415 COLL VENOUS BLD VENIPUNCTURE: CPT | Mod: PN

## 2025-04-21 PROCEDURE — 84443 ASSAY THYROID STIM HORMONE: CPT

## 2025-04-21 PROCEDURE — 84439 ASSAY OF FREE THYROXINE: CPT

## 2025-04-29 ENCOUNTER — RESULTS FOLLOW-UP (OUTPATIENT)
Dept: FAMILY MEDICINE | Facility: CLINIC | Age: 80
End: 2025-04-29

## 2025-04-29 DIAGNOSIS — E03.9 PRIMARY HYPOTHYROIDISM: Primary | ICD-10-CM

## 2025-04-29 RX ORDER — LEVOTHYROXINE SODIUM 100 UG/1
CAPSULE ORAL
Qty: 30 CAPSULE | Refills: 5 | Status: SHIPPED | OUTPATIENT
Start: 2025-04-29

## 2025-04-30 ENCOUNTER — TELEPHONE (OUTPATIENT)
Dept: FAMILY MEDICINE | Facility: CLINIC | Age: 80
End: 2025-04-30
Payer: MEDICARE

## 2025-04-30 ENCOUNTER — PATIENT MESSAGE (OUTPATIENT)
Dept: FAMILY MEDICINE | Facility: CLINIC | Age: 80
End: 2025-04-30
Payer: MEDICARE

## 2025-04-30 NOTE — TELEPHONE ENCOUNTER
I spoke with the pt and she reports that she is being treated in [place] by an Endocrinologist that get her medication at a discount.  When ask if we can schedule the lab work she said she will wait to see what the other doctor says

## 2025-04-30 NOTE — TELEPHONE ENCOUNTER
Patient has been called and scheduled for lab appt.  ----- Message from Gato Spain MD sent at 4/30/2025  2:00 PM CDT -----  Regarding: FW: SELF  Schedule TSH and T4 that was ordered yesterday to be done in 8 weeks.  Non fasting.  Thanks.  ----- Message -----  From: Aurea Fortune MA  Sent: 4/30/2025   1:09 PM CDT  To: Gato Spain MD  Subject: FW: SELF                                         Pt would like to have TSH done here.  ----- Message -----  From: Aurea Fortune MA  Sent: 4/29/2025   4:57 PM CDT  To: Aurea Fortune MA  Subject: FW: SELF                                           ----- Message -----  From: Olayinka Arcos  Sent: 4/29/2025   4:55 PM CDT  To: [facility] Staff  Subject: SELF                                             Type: Patient Call BackWho called:selfWhat is the request in detail:Patient states that her endocrinologist wants her recent lab results to review. Fax to # [phone] Nurse Rosalinda called the patient from [email or web address]. Can the clinic reply by [facility]? NoWould the patient rather a call back or a response via [facility]? Call [facility] call back number:[phone] Information:Thank you.

## 2025-04-30 NOTE — TELEPHONE ENCOUNTER
----- Message from Gato Spain MD sent at 4/29/2025  4:33 PM CDT -----  Please call to schedule non fasting tsh and free t4 in 8 weeks and advise:  Thyroid function is abnormal and requires increase in thyroid medication dosing.  Tirosint increased to 100 mcg daily and plan to repeat thyroid labs in 8 weeks.  ----- Message -----  From: Lab, Background User  Sent: 4/21/2025   4:10 PM CDT  To: Gato Spain MD

## 2025-05-21 ENCOUNTER — TELEPHONE (OUTPATIENT)
Dept: FAMILY MEDICINE | Facility: CLINIC | Age: 80
End: 2025-05-21
Payer: MEDICARE

## 2025-05-21 NOTE — TELEPHONE ENCOUNTER
Dermatologist instructed patient to f/u with ENT and sent the referral to ENT. Patient wanted to know if this is what she should do? Nurse explain follow dermatologist instructions and f/u with ENT. Next appointment with Gato Spain MD is on 05/29/2025 and discuss all results with PCP during OV. Patient verbalized understanding.

## 2025-05-21 NOTE — TELEPHONE ENCOUNTER
----- Message from Alexandra sent at 5/21/2025 10:46 AM CDT -----  Who called:SELF  What is the request in detail:PT would like for you to give her a call in regards of mast cell disease lipoals disease the dermatologist she saw said for her to go to an ENT  because she thinks she needs a biopsy Pt Is  trying to get with ENT but she wants to know do she needs to come in and  see you or no  Can the clinic reply by [facility]? Would the patient rather a call back or a response via [facility]?callback  [facility] call back number:..[phone] Additional Information:

## 2025-05-28 ENCOUNTER — TELEPHONE (OUTPATIENT)
Dept: FAMILY MEDICINE | Facility: CLINIC | Age: 80
End: 2025-05-28
Payer: MEDICARE

## 2025-05-28 NOTE — TELEPHONE ENCOUNTER
----- Message from Tech Amanda sent at 5/28/2025 12:41 PM CDT -----  Regarding: Patient call back  .Type: Patient Call BackWho called:self What is the request in detail:caller states she started to cough up green mucus on today. Asking if the doctor would like her to come in early for her appointment on tomorrow to have an x-ray done?Can the clinic reply by [facility]?no Would the patient rather a call back or a response via [facility]? Call [facility] call back number:.[phone] Information:

## 2025-05-28 NOTE — TELEPHONE ENCOUNTER
I spoke with the pt and she states that she wanted to know if the doctor wanted her to get a xray because of the mucous she is coughing up .  I instructed the pt that [facility] would be able to order AT her appointment tomorrow

## 2025-05-29 ENCOUNTER — OFFICE VISIT (OUTPATIENT)
Dept: FAMILY MEDICINE | Facility: CLINIC | Age: 80
End: 2025-05-29
Payer: MEDICARE

## 2025-05-29 VITALS
HEART RATE: 84 BPM | TEMPERATURE: 99 F | OXYGEN SATURATION: 96 % | DIASTOLIC BLOOD PRESSURE: 58 MMHG | BODY MASS INDEX: 37.63 KG/M2 | SYSTOLIC BLOOD PRESSURE: 144 MMHG | RESPIRATION RATE: 18 BRPM | WEIGHT: 192.69 LBS

## 2025-05-29 DIAGNOSIS — J01.90 ACUTE SINUSITIS, RECURRENCE NOT SPECIFIED, UNSPECIFIED LOCATION: Primary | ICD-10-CM

## 2025-05-29 DIAGNOSIS — R59.0 CERVICAL LYMPHADENOPATHY: ICD-10-CM

## 2025-05-29 DIAGNOSIS — I10 ESSENTIAL HYPERTENSION: ICD-10-CM

## 2025-05-29 PROCEDURE — 99214 OFFICE O/P EST MOD 30 MIN: CPT | Mod: S$PBB,,, | Performed by: INTERNAL MEDICINE

## 2025-05-29 PROCEDURE — 99214 OFFICE O/P EST MOD 30 MIN: CPT | Mod: PBBFAC,PN | Performed by: INTERNAL MEDICINE

## 2025-05-29 PROCEDURE — 99999 PR PBB SHADOW E&M-EST. PATIENT-LVL IV: CPT | Mod: PBBFAC,,, | Performed by: INTERNAL MEDICINE

## 2025-05-29 RX ORDER — RSV VACC, PREF A AND PREF B/PF 120MCG/0.5
0.5 VIAL (EA) INTRAMUSCULAR ONCE
Qty: 0.5 ML | Refills: 0 | Status: CANCELLED | OUTPATIENT
Start: 2025-05-29 | End: 2025-05-29

## 2025-05-29 RX ORDER — METHYLPREDNISOLONE 4 MG/1
TABLET ORAL
Qty: 1 EACH | Refills: 0 | Status: SHIPPED | OUTPATIENT
Start: 2025-05-29

## 2025-05-29 RX ORDER — AMOXICILLIN AND CLAVULANATE POTASSIUM 875; 125 MG/1; MG/1
1 TABLET, FILM COATED ORAL 2 TIMES DAILY
Qty: 20 TABLET | Refills: 0 | Status: SHIPPED | OUTPATIENT
Start: 2025-05-29 | End: 2025-06-08

## 2025-06-09 ENCOUNTER — PATIENT MESSAGE (OUTPATIENT)
Dept: ADMINISTRATIVE | Facility: HOSPITAL | Age: 80
End: 2025-06-09
Payer: MEDICARE

## 2025-06-09 ENCOUNTER — TELEPHONE (OUTPATIENT)
Dept: FAMILY MEDICINE | Facility: CLINIC | Age: 80
End: 2025-06-09
Payer: MEDICARE

## 2025-06-09 DIAGNOSIS — N18.31 CHRONIC KIDNEY DISEASE, STAGE 3A: Primary | ICD-10-CM

## 2025-06-09 DIAGNOSIS — Z12.31 BREAST CANCER SCREENING BY MAMMOGRAM: ICD-10-CM

## 2025-06-09 NOTE — TELEPHONE ENCOUNTER
----- Message from Alexandra sent at 6/9/2025 11:11 AM CDT -----  Who called:self  What is the request in detail:pt would like for you to give her a call in regards of faxing over a referral over to this kidney doctor by the name of abbie moore Can the clinic reply by [facility]? Would the patient rather a call back or a response via [facility]?callback  [facility] call back number:.[phone] Additional Information:the doctor phone number is [phone] pt also wants you to know she still swelling up on her cheeks and above  right knee ent schedule two  ct scans for 6-25-25 for neck and face

## 2025-06-09 NOTE — TELEPHONE ENCOUNTER
----- Message from Alexandra sent at 6/9/2025 11:18 AM CDT -----  Who Called:self  Referral to What Specialty:mammogram  Reason for Referral:one year mammogram  test  Does the patient want the referral with a specific physician?:no  [facility]  Would the patient rather a call back or a response via [facility]?callback  [facility] Call Back Number:.[phone] Additional Information: Type: RX Refill Request

## 2025-06-11 ENCOUNTER — LAB VISIT (OUTPATIENT)
Dept: LAB | Facility: HOSPITAL | Age: 80
End: 2025-06-11
Payer: MEDICARE

## 2025-06-11 ENCOUNTER — OFFICE VISIT (OUTPATIENT)
Dept: CARDIOLOGY | Facility: CLINIC | Age: 80
End: 2025-06-11
Payer: MEDICARE

## 2025-06-11 VITALS
SYSTOLIC BLOOD PRESSURE: 145 MMHG | RESPIRATION RATE: 18 BRPM | HEART RATE: 69 BPM | DIASTOLIC BLOOD PRESSURE: 70 MMHG | OXYGEN SATURATION: 98 % | WEIGHT: 190 LBS | BODY MASS INDEX: 37.3 KG/M2 | HEIGHT: 60 IN

## 2025-06-11 DIAGNOSIS — R09.89 BILATERAL CAROTID BRUITS: ICD-10-CM

## 2025-06-11 DIAGNOSIS — R06.09 DYSPNEA ON EXERTION: ICD-10-CM

## 2025-06-11 DIAGNOSIS — I10 ESSENTIAL HYPERTENSION: ICD-10-CM

## 2025-06-11 DIAGNOSIS — G47.33 OBSTRUCTIVE SLEEP APNEA ON CPAP: ICD-10-CM

## 2025-06-11 DIAGNOSIS — R07.9 CHEST PAIN, UNSPECIFIED TYPE: Primary | ICD-10-CM

## 2025-06-11 DIAGNOSIS — K21.00 GASTROESOPHAGEAL REFLUX DISEASE WITH ESOPHAGITIS WITHOUT HEMORRHAGE: ICD-10-CM

## 2025-06-11 DIAGNOSIS — I35.0 NONRHEUMATIC AORTIC VALVE STENOSIS: ICD-10-CM

## 2025-06-11 DIAGNOSIS — I25.110 ATHEROSCLEROSIS OF NATIVE CORONARY ARTERY OF NATIVE HEART WITH UNSTABLE ANGINA PECTORIS: ICD-10-CM

## 2025-06-11 DIAGNOSIS — E07.9 THYROID DYSFUNCTION: ICD-10-CM

## 2025-06-11 LAB
ALBUMIN SERPL-MCNC: 3.9 G/DL (ref 3.5–5.2)
ALP SERPL-CCNC: 60 UNIT/L (ref 55–135)
ALT SERPL-CCNC: 16 UNIT/L (ref 10–44)
ANION GAP (SMH): 11 MMOL/L (ref 8–16)
AST SERPL-CCNC: 17 UNIT/L (ref 10–40)
BILIRUB SERPL-MCNC: 0.7 MG/DL (ref 0.1–1)
BNP SERPL-MCNC: 12 PG/ML
BUN SERPL-MCNC: 14 MG/DL (ref 8–23)
CALCIUM SERPL-MCNC: 9.7 MG/DL (ref 8.7–10.5)
CHLORIDE SERPL-SCNC: 103 MMOL/L (ref 95–110)
CO2 SERPL-SCNC: 25 MMOL/L (ref 23–29)
CREAT SERPL-MCNC: 1 MG/DL (ref 0.5–1.4)
GFR SERPLBLD CREATININE-BSD FMLA CKD-EPI: 58 ML/MIN/1.73/M2
GLUCOSE SERPL-MCNC: 107 MG/DL (ref 70–110)
OHS QRS DURATION: 124 MS
OHS QTC CALCULATION: 465 MS
POTASSIUM SERPL-SCNC: 3.7 MMOL/L (ref 3.5–5.1)
PROT SERPL-MCNC: 7.1 GM/DL (ref 6–8.4)
SODIUM SERPL-SCNC: 139 MMOL/L (ref 136–145)

## 2025-06-11 PROCEDURE — 99999 PR PBB SHADOW E&M-EST. PATIENT-LVL V: CPT | Mod: PBBFAC,,,

## 2025-06-11 PROCEDURE — 36415 COLL VENOUS BLD VENIPUNCTURE: CPT

## 2025-06-11 PROCEDURE — 99215 OFFICE O/P EST HI 40 MIN: CPT | Mod: PBBFAC,PN

## 2025-06-11 PROCEDURE — 99214 OFFICE O/P EST MOD 30 MIN: CPT | Mod: S$PBB,,,

## 2025-06-11 PROCEDURE — 82040 ASSAY OF SERUM ALBUMIN: CPT

## 2025-06-11 PROCEDURE — 83880 ASSAY OF NATRIURETIC PEPTIDE: CPT

## 2025-06-11 RX ORDER — SPIRONOLACTONE 25 MG/1
25 TABLET ORAL DAILY
Qty: 90 TABLET | Refills: 3 | Status: SHIPPED | OUTPATIENT
Start: 2025-06-11 | End: 2026-06-11

## 2025-06-11 NOTE — ASSESSMENT & PLAN NOTE
Encouraged heart healthy diet and exercise as tolerated.   Recommend LDL <100.  Reduce saturated fats.  Weight loss  Maintain /80 or less.

## 2025-06-12 ENCOUNTER — HOSPITAL ENCOUNTER (OUTPATIENT)
Dept: RADIOLOGY | Facility: HOSPITAL | Age: 80
Discharge: HOME | End: 2025-06-12
Attending: INTERNAL MEDICINE
Payer: MEDICARE

## 2025-06-12 DIAGNOSIS — Z12.31 BREAST CANCER SCREENING BY MAMMOGRAM: ICD-10-CM

## 2025-06-12 PROCEDURE — 77063 BREAST TOMOSYNTHESIS BI: CPT | Mod: 26,,, | Performed by: RADIOLOGY

## 2025-06-12 PROCEDURE — 77067 SCR MAMMO BI INCL CAD: CPT | Mod: TC

## 2025-06-12 PROCEDURE — 77067 SCR MAMMO BI INCL CAD: CPT | Mod: 26,,, | Performed by: RADIOLOGY

## 2025-06-12 NOTE — PROGRESS NOTES
Subjective:    Patient ID:  Iram Aguirre is a 79 y.o. female patient here for evaluation Memory Loss, Shortness of Breath, Chest Pain, Thyroid Problem, and Results      History of Present Illness    CHIEF COMPLAINT:  Patient presents today with concerns about swelling and dyspnea    HISTORY OF PRESENT ILLNESS:  She reports neck area swelling and LLE swelling. She experiences dyspnea since vicky COVID, which initially improved but has recently worsened. She becomes dyspneic with minimal exertion within 5 minutes of activity, such as walking to her van or unloading items. Previously active in gardening, she now struggles with these simple tasks due to breathlessness. She experiences sudden onset fatigue during the day, where she abruptly becomes overwhelmingly tired requiring rest. She also reports pain localized behind and underneath the shoulder blade.    MUSCULOSKELETAL:  She experiences muscle spasms in her hands and severe nocturnal abdifatah horses affecting her feet.    SURGICAL HISTORY:  She has undergone multiple sinus surgeries, including 4 major fungal sinus surgeries since 2000, with the most recent in 2023.  She has also had multiple orthopedic surgeries including foot surgery, bilateral knee surgeries, and removal of a knee cyst.    MEDICATIONS:  Current medications include Terazosin 7 mg, Losartan 100 mg, and HCTZ 25 mg for BP management; Isosorbide 60 mg (recently corrected from 30 mg to proper dosing of two 30 mg tablets); Levothyroxine; and supplements including Turmeric, Multivitamin, Magnesium, Folate, and Claritin.      ROS:  General: -fever, -chills, +fatigue, -weight gain, -weight loss, +excessive drowsiness  Cardiovascular: +chest pain, -palpitations, +lower extremity edema  Respiratory: -cough, +shortness of breath, +difficulty breathing, +exertional dyspnea  Gastrointestinal: -abdominal pain, -nausea, -vomiting, -diarrhea, -constipation, -blood in stool  Genitourinary: -dysuria,  -hematuria, -frequency  Musculoskeletal: -joint pain, -muscle pain, +muscle spasms, +back pain  Skin: -rash, -lesion, +cold extremities  Neurological: -headache, -dizziness, -numbness, -tingling  Psychiatric: -anxiety, -depression, -sleep difficulty  Neck: +neck lumps, +swollen glands, +neck swelling                    Review of patient's allergies indicates:   Allergen Reactions    Cortisone Shortness Of Breath     Causes heart to race    Hair formula [mv,iron,min-folic acid-biotin]      dye    Levaquin [levofloxacin] Other (See Comments)     Tongue swelling and blisters    Codeine Nausea Only    Doxycycline      Other reaction(s): Unknown       Past Medical History:   Diagnosis Date    AR (allergic rhinitis) 11/29/2012    Aspergillosis     1992    Benign hypertension     Depression     Diverticulosis 2005    Esophageal polyp     Fibromyalgia     Gastric ulcer; benign 2007    GERD (gastroesophageal reflux disease) 11/29/2012    Hypercholesteremia     Hypothyroidism     Kidney stones     MTHFR gene mutation     per daughter E72.8    Obstructive sleep apnea on CPAP     Osteopenia     Rectal polyp     Skin cancer     pre skin cancer skin    Vulvovaginal melanosis      Past Surgical History:   Procedure Laterality Date    CHOLECYSTECTOMY  08/08/2012    CHOLECYSTECTOMY      COLONOSCOPY N/A 02/06/2017    Procedure: COLONOSCOPY;  Surgeon: Arturo Crane MD;  Location: [facility];  Service: Endoscopy;  Laterality: N/A;    COSMETIC SURGERY  2024    Eyelids lifted    EYE SURGERY  2005    Tear duct    FOOT FRACTURE SURGERY  2005    plate in right foot    GANGLION CYST EXCISION  1980    left wrist    HYSTERECTOMY      KNEE ARTHROSCOPY W/ ACL RECONSTRUCTION      bilateral     NASAL SEPTUM SURGERY      OOPHORECTOMY      TEAR DUCT SURGERY  2006    left eye    TOTAL VAGINAL HYSTERECTOMY  age 31    w/BSO    TUBAL LIGATION  age 26     Social History[1]                Objective        Vitals:    06/11/25 1411   BP: (!) 145/70    Pulse: 69   Resp: 18       LIPIDS - LAST 2   Lab Results   Component Value Date    CHOL 234 (H) 07/02/2024    CHOL 262 (H) 09/19/2022    HDL 64 07/02/2024    HDL 74 09/19/2022    LDLCALC 152.6 07/02/2024    LDLCALC 174.4 (H) 09/19/2022    TRIG 87 07/02/2024    TRIG 68 09/19/2022    CHOLHDL 27.4 07/02/2024    CHOLHDL 28.2 09/19/2022       CBC - LAST 2  Lab Results   Component Value Date    WBC 7.95 08/01/2024    WBC 5.70 07/02/2024    RBC 4.02 08/01/2024    RBC 3.80 (L) 07/02/2024    HGB 12.4 08/01/2024    HGB 11.8 (L) 07/02/2024    HCT 37.7 08/01/2024    HCT 37.6 07/02/2024    MCV 94 08/01/2024    MCV 99 (H) 07/02/2024    MCH 30.8 08/01/2024    MCH 31.1 (H) 07/02/2024    MCHC 32.9 08/01/2024    MCHC 31.4 (L) 07/02/2024    RDW 12.9 08/01/2024    RDW 13.5 07/02/2024     08/01/2024     07/02/2024    MPV 9.7 08/01/2024    MPV 9.8 07/02/2024    GRAN 4.3 08/01/2024    GRAN 53.5 08/01/2024    LYMPH 2.7 08/01/2024    LYMPH 33.8 08/01/2024    MONO 0.8 08/01/2024    MONO 9.7 08/01/2024    BASO 0.05 08/01/2024    BASO 0.05 07/02/2024    NRBC 0 08/01/2024    NRBC 0 07/02/2024       CHEMISTRY & LIVER FUNCTION - LAST 2  Lab Results   Component Value Date     06/11/2025     02/12/2025    K 3.7 06/11/2025    K 3.6 02/12/2025     06/11/2025     02/12/2025    CO2 25 06/11/2025    CO2 27 02/12/2025    ANIONGAP 11 06/11/2025    ANIONGAP 11 02/12/2025    BUN 14 06/11/2025    BUN 16 02/12/2025    CREATININE 1.0 06/11/2025    CREATININE 1.0 02/12/2025     06/11/2025    GLU 99 02/12/2025    CALCIUM 9.7 06/11/2025    CALCIUM 10.0 02/12/2025    MG 1.8 11/08/2019    ALBUMIN 3.9 06/11/2025    ALBUMIN 3.9 02/12/2025    PROT 7.1 06/11/2025    PROT 7.4 02/12/2025    ALKPHOS 60 06/11/2025    ALKPHOS 65 02/12/2025    ALT 16 06/11/2025    ALT 20 02/12/2025    AST 17 06/11/2025    AST 25 02/12/2025    BILITOT 0.7 06/11/2025    BILITOT 0.7 02/12/2025        CARDIAC PROFILE - LAST 2  Lab Results    Component Value Date    BNP 12 06/11/2025    BNP 10 09/15/2022    CPK 68 03/24/2021     03/24/2021    TROPONINI <0.030 03/24/2021    TROPONINI <0.030 11/08/2019        COAGULATION - LAST 2  Lab Results   Component Value Date    LABPT 13.8 11/07/2019    INR 1.2 09/20/2021    INR 1.1 11/07/2019       ENDOCRINE & PSA - LAST 2  Lab Results   Component Value Date    HGBA1C 5.7 (H) 02/12/2025    HGBA1C 5.5 07/02/2024    TSH 7.293 (H) 04/21/2025    TSH 5.495 (H) 02/12/2025    PROCAL <0.05 03/24/2021        ECHOCARDIOGRAM RESULTS  Results for orders placed during the hospital encounter of 12/07/23    Echo    Interpretation Summary    Left Ventricle: The left ventricle is normal in size. Mildly increased wall thickness. There is mild concentric hypertrophy. Normal wall motion. There is normal systolic function with a visually estimated ejection fraction of 65 - 70%. Grade I diastolic dysfunction.    Right Ventricle: Normal right ventricular cavity size. Systolic function is normal.    Aortic Valve: The aortic valve is a trileaflet valve. There is moderate aortic valve sclerosis. There is mild stenosis. Aortic valve area by VTI is 1.77 cm². Aortic valve peak velocity is 2.57 m/s. Mean gradient is 16 mmHg. The dimensionless index is 0.54.    Pulmonary Artery: The estimated pulmonary artery systolic pressure is 33 mmHg.      CURRENT/PREVIOUS VISIT EKG  Results for orders placed or performed in visit on 06/11/25   IN OFFICE EKG 12-LEAD (to [facility])    Collection Time: 06/11/25  1:55 PM   Result Value Ref Range    QRS Duration 124 ms    [facility] QTC Calculation 465 ms    Narrative    Test Reason : R07.9,    Vent. Rate :  69 BPM     Atrial Rate :  69 BPM     P-R Int : 148 ms          QRS Dur : 124 ms      QT Int : 434 ms       P-R-T Axes :  40  33  30 degrees    QTcB Int : 465 ms    Normal sinus rhythm  Right bundle branch block  Abnormal ECG  When compared with ECG of 14-Nov-2024 09:45,  No significant change was  found    Referred By:            Confirmed By:      No valid procedures specified.   Results for orders placed during the hospital encounter of 11/26/24    Nuclear Stress - Cardiology Interpreted    Interpretation Summary    Normal myocardial perfusion scan. There is no evidence of myocardial ischemia or infarction.    The gated perfusion images showed an ejection fraction of 69% at rest. The gated perfusion images showed an ejection fraction of 70% post stress. Normal ejection fraction is greater than 53%.    There is normal wall motion at rest and post-stress.    LV cavity size is normal at rest and normal at post-stress.    The ECG portion of the study is abnormal but not diagnostic due to resting ST-T abnormalities.    The patient reported no chest pain during the stress test.    There were no arrhythmias during stress.    No valid procedures specified.    Physical Exam    Vitals: Height: 5 ft. Short stature. Blood pressure: 145/70.  General: No acute distress. Well-developed. Well-nourished.  Eyes: Sclerae anicteric.  HENT: Normocephalic. Atraumatic.  Cardiovascular: Regular rate. Regular rhythm. + murmur. No rubs. No gallops. Normal S1, S2.  Respiratory: Normal respiratory effort. Clear to auscultation bilaterally. No rales. No rhonchi. No wheezing.  Abdomen: Soft. Non-tender. Non-distended. Normoactive bowel sounds.  Musculoskeletal: No  obvious deformity.  Extremities: Swelling in legs- 1 + RLE and 2+ LLE.  Neurological: Alert & oriented x3. No slurred speech. Normal gait.  Psychiatric: Normal mood. Normal affect. Good insight. Good judgment.  Skin: Warm. Dry. No rash.         I HAVE REVIEWED :    The vital signs, nurses notes, and all the pertinent radiology and labs.        Current Outpatient Medications   Medication Instructions    azelastine (ASTELIN) 137 mcg, Nasal, Daily    cholecalciferol, vitamin D3, 125 mcg (5,000 unit) Tab 1 tablet, Every Mon, Wed, Fri    coenzyme Q10 100 mg, Daily    FLUoxetine 20  mg, Oral, Daily    fluticasone propionate (FLONASE) 50 mcg, Each Nostril, Daily PRN    isosorbide mononitrate (IMDUR) 60 mg, Oral, Daily    Lactobacillus rhamnosus GG (CULTURELLE) 10 billion cell capsule 1 capsule, Daily    levomefolate calcium (ELFOLATE) 15 mg, Oral, Daily    levothyroxine (TIROSINT) 100 mcg Cap Take one cap po daily first thing in the morning on an empty stomach.  Wait to consume food, drinks and other medication for an hour after taking this medication.    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  R07.9 Chest pain, unspecified type  R06.09 Dyspnea on exertion  E07.9 Thyroid dysfunction  K21.00 Gastroesophageal reflux disease with esophagitis without hemorrhage  I35.0 Nonrheumatic aortic valve stenosis  R09.89 Bilateral carotid bruits  I10 Essential hypertension  G47.33 Obstructive sleep apnea on CPAP    PLAN SUMMARY:  - Ordered repeat lab work to check fluid levels  - Ordered Echocardiogram to evaluate diastolic dysfunction progression  - Continued Isosorbide 60 mg  - Discontinued potassium supplement  - Started Spironolactone (morning dose)  - Continued Magnesium L-Threonate  - Continued Losartan 100 mg / HCTZ 25 mg combination  - Continued Terazosin 7 mg at nighttime  - Adjusted diuretic regimen for fluid overload and blood pressure control  - Patient to follow 2,000 mg low sodium diet  -  Restrict fluid intake to 1.5 L per day  - Obtain new upper arm blood pressure cuff for home monitoring  - Follow up in 2 months    ATHEROSCLEROSIS OF NATIVE CORONARY ARTERY OF NATIVE HEART WITH UNSTABLE ANGINA PECTORIS:  - Continued Isosorbide 60 mg.    DYSPNEA ON EXERTION:  - Shortness of breath assessed in context of known diastolic heart failure.  - Explained diastolic heart failure and its relationship to fluid retention and high blood pressure.    THYROID DYSFUNCTION:  - Managed by PCP on levothyroxine    NONRHEUMATIC AORTIC VALVE STENOSIS:  - Ordered Echocardiogram to evaluate for progression of diastolic dysfunction.    ESSENTIAL HYPERTENSION:  - Blood pressure elevated at 145/70.  - Diuretic regimen adjusted to address fluid overload and improve blood pressure control.  - Explained diastolic heart failure and its relationship to fluid retention and high blood pressure.  - Patient to follow a 2,000 mg low sodium diet, restrict fluid intake to 1.5 L (40-50 oz) per day, avoid all types of salt including pink salt and Celtic salt, use herbs and Mrs. Watkins for seasoning instead of salt, aim for a blood pressure of 130/80 or less, obtain a new upper arm blood pressure cuff for home monitoring.  - Started Spironolactone (take in the morning).  - Continued Terazosin 7 mg at nighttime.  - Continued Losartan 100 mg / HCTZ 25 mg combination.  - Discontinued potassium supplement.  - Ordered repeat lab work to check fluid levels.  - Follow up in 2 months.    ADDITIONAL FINDINGS (NOT EXPLICITLY CODED):  - Swelling, fatigue assessed in context of known diastolic heart failure.  - Fluid retention considered major contributor to symptoms.  - Continued Magnesium L-Threonate (if tolerated well).           Bilateral carotid bruits  No evidence of a hemodynamically significant carotid bifurcation stenosis.     Atherosclerotic heart disease of native coronary artery with unstable angina pectoris  Encouraged heart healthy diet and  exercise as tolerated.   Recommend LDL <100.  Reduce saturated fats.  Weight loss  Maintain /80 or less.      This note was generated with the assistance of ambient listening technology. Verbal consent was obtained by the patient and accompanying visitor(s) for the recording of patient appointment to facilitate this note. I attest to having reviewed and edited the generated note for accuracy, though some syntax or spelling errors may persist. Please contact the author of this note for any clarification.             [1]   Social History  Tobacco Use    Smoking status: Never    Smokeless tobacco: Never   Substance Use Topics    Alcohol use: No    Drug use: No

## 2025-06-13 ENCOUNTER — PATIENT MESSAGE (OUTPATIENT)
Dept: FAMILY MEDICINE | Facility: CLINIC | Age: 80
End: 2025-06-13
Payer: MEDICARE

## 2025-06-17 DIAGNOSIS — E07.9 THYROID DYSFUNCTION: Primary | ICD-10-CM

## 2025-06-18 ENCOUNTER — RESULTS FOLLOW-UP (OUTPATIENT)
Dept: FAMILY MEDICINE | Facility: CLINIC | Age: 80
End: 2025-06-18

## 2025-06-18 DIAGNOSIS — R92.8 ABNORMAL MAMMOGRAM OF LEFT BREAST: Primary | ICD-10-CM

## 2025-06-18 NOTE — TELEPHONE ENCOUNTER
Spoke with patient want to know who labs she should do. Both Dr. Spain and Dr. Ho order the same test. I told her anyone she chose to a long we get the results.

## 2025-06-19 ENCOUNTER — TELEPHONE (OUTPATIENT)
Dept: FAMILY MEDICINE | Facility: CLINIC | Age: 80
End: 2025-06-19
Payer: MEDICARE

## 2025-06-19 NOTE — TELEPHONE ENCOUNTER
----- Message from Gato Spain MD sent at 6/18/2025  3:20 PM CDT -----  Please call to schedule left diagnostic mammo and ultrasound to follow up abnormality on screening mammo.  ----- Message -----  From: Nora Good MD  Sent: 6/13/2025   3:53 PM CDT  To: Gato Spain MD

## 2025-06-20 ENCOUNTER — TELEPHONE (OUTPATIENT)
Dept: CARDIOLOGY | Facility: CLINIC | Age: 80
End: 2025-06-20
Payer: MEDICARE

## 2025-06-20 NOTE — TELEPHONE ENCOUNTER
Copied from CRM #[id number]. Topic: General Inquiry - Patient Advice  >> Jun 20, 2025 11:52 AM Stefania wrote:  Type:  Needs Medical Advice    Who Called: pt     Best Call Back Number: [phone]    Additional Information: pt st she needs a callback to discuss some concerns about her spironolactone. She st that since taking it she had lost weight in been feeling very tired. Please call to discuss.

## 2025-06-24 ENCOUNTER — HOSPITAL ENCOUNTER (OUTPATIENT)
Dept: CARDIOLOGY | Facility: HOSPITAL | Age: 80
Discharge: HOME | End: 2025-06-24
Payer: MEDICARE

## 2025-06-24 VITALS — BODY MASS INDEX: 37.31 KG/M2 | WEIGHT: 190.06 LBS | HEIGHT: 60 IN

## 2025-06-24 DIAGNOSIS — R06.09 DYSPNEA ON EXERTION: ICD-10-CM

## 2025-06-24 PROCEDURE — 93306 TTE W/DOPPLER COMPLETE: CPT | Mod: 26,,, | Performed by: INTERNAL MEDICINE

## 2025-06-24 PROCEDURE — 93306 TTE W/DOPPLER COMPLETE: CPT

## 2025-06-25 ENCOUNTER — PATIENT OUTREACH (OUTPATIENT)
Dept: ADMINISTRATIVE | Facility: HOSPITAL | Age: 80
End: 2025-06-25
Payer: MEDICARE

## 2025-06-25 LAB
AORTIC ROOT ANNULUS: 2.8 CM
AORTIC VALVE CUSP SEPERATION: 1.2 CM
APICAL FOUR CHAMBER EJECTION FRACTION: 64 %
APICAL TWO CHAMBER EJECTION FRACTION: 64 %
AV INDEX (PROSTH): 0.5
AV MEAN GRADIENT: 13 MMHG
AV PEAK GRADIENT: 23 MMHG
AV VALVE AREA BY VELOCITY RATIO: 1.4 CM²
AV VALVE AREA: 1.4 CM²
AV VELOCITY RATIO: 0.5
BSA FOR ECHO PROCEDURE: 1.91 M2
CV ECHO LV RWT: 0.45 CM
DOP CALC AO PEAK VEL: 2.4 M/S
DOP CALC AO VTI: 50.9 CM
DOP CALC LVOT AREA: 2.8 CM2
DOP CALC LVOT DIAMETER: 1.9 CM
DOP CALC LVOT PEAK VEL: 1.2 M/S
DOP CALC LVOT STROKE VOLUME: 71.4 CM3
DOP CALC MV VTI: 39.5 CM
DOP CALCLVOT PEAK VEL VTI: 25.2 CM
E WAVE DECELERATION TIME: 220 MSEC
E/A RATIO: 1.02
E/E' RATIO: 16 M/S
ECHO LV POSTERIOR WALL: 1 CM (ref 0.6–1.1)
FRACTIONAL SHORTENING: 36.4 % (ref 28–44)
INTERVENTRICULAR SEPTUM: 1 CM (ref 0.6–1.1)
IVC DIAMETER: 1.8 CM
IVRT: 56 MSEC
LEFT ATRIUM AREA SYSTOLIC (APICAL 2 CHAMBER): 17.2 CM2
LEFT ATRIUM AREA SYSTOLIC (APICAL 4 CHAMBER): 13.2 CM2
LEFT ATRIUM SIZE: 3.9 CM
LEFT ATRIUM VOLUME INDEX MOD: 22 ML/M2
LEFT ATRIUM VOLUME MOD: 41 ML
LEFT INTERNAL DIMENSION IN SYSTOLE: 2.8 CM (ref 2.1–4)
LEFT VENTRICLE DIASTOLIC VOLUME INDEX: 48.09 ML/M2
LEFT VENTRICLE DIASTOLIC VOLUME: 88 ML
LEFT VENTRICLE END DIASTOLIC VOLUME APICAL 2 CHAMBER: 83.4 ML
LEFT VENTRICLE END DIASTOLIC VOLUME APICAL 4 CHAMBER: 84.7 ML
LEFT VENTRICLE END SYSTOLIC VOLUME APICAL 2 CHAMBER: 49.5 ML
LEFT VENTRICLE END SYSTOLIC VOLUME APICAL 4 CHAMBER: 31.7 ML
LEFT VENTRICLE MASS INDEX: 80.8 G/M2
LEFT VENTRICLE SYSTOLIC VOLUME INDEX: 16.4 ML/M2
LEFT VENTRICLE SYSTOLIC VOLUME: 30 ML
LEFT VENTRICULAR INTERNAL DIMENSION IN DIASTOLE: 4.4 CM (ref 3.5–6)
LEFT VENTRICULAR MASS: 147.8 G
LV LATERAL E/E' RATIO: 12.2 M/S
LV SEPTAL E/E' RATIO: 22 M/S
LVED V (TEICH): 87.69 ML
LVES V (TEICH): 29.55 ML
LVOT MG: 3 MMHG
LVOT MV: 0.75 CM/S
MV MEAN GRADIENT: 3 MMHG
MV PEAK A VEL: 1.08 M/S
MV PEAK E VEL: 1.1 M/S
MV PEAK GRADIENT: 6 MMHG
MV VALVE AREA BY CONTINUITY EQUATION: 1.81 CM2
OHS CV RV/LV RATIO: 0.5 CM
OHS LV EJECTION FRACTION SIMPSONS BIPLANE MOD: 64 %
PV MV: 0.91 M/S
PV PEAK GRADIENT: 7 MMHG
PV PEAK VELOCITY: 1.28 M/S
RA PRESSURE ESTIMATED: 3 MMHG
RIGHT VENTRICLE DIASTOLIC BASEL DIMENSION: 2.2 CM
RIGHT VENTRICULAR END-DIASTOLIC DIMENSION: 2.2 CM
RV TISSUE DOPPLER FREE WALL SYSTOLIC VELOCITY 1 (APICAL 4 CHAMBER VIEW): 12.3 CM/S
TDI LATERAL: 0.09 M/S
TDI SEPTAL: 0.05 M/S
TDI: 0.07 M/S
TRICUSPID ANNULAR PLANE SYSTOLIC EXCURSION: 2.1 CM
Z-SCORE OF LEFT VENTRICULAR DIMENSION IN END DIASTOLE: -1.42
Z-SCORE OF LEFT VENTRICULAR DIMENSION IN END SYSTOLE: -0.88

## 2025-06-26 ENCOUNTER — RESULTS FOLLOW-UP (OUTPATIENT)
Dept: CARDIOLOGY | Facility: CLINIC | Age: 80
End: 2025-06-26

## 2025-06-26 ENCOUNTER — RESULTS FOLLOW-UP (OUTPATIENT)
Dept: FAMILY MEDICINE | Facility: CLINIC | Age: 80
End: 2025-06-26

## 2025-06-26 DIAGNOSIS — E07.9 THYROID DYSFUNCTION: Primary | ICD-10-CM

## 2025-06-27 ENCOUNTER — TELEPHONE (OUTPATIENT)
Dept: FAMILY MEDICINE | Facility: CLINIC | Age: 80
End: 2025-06-27
Payer: MEDICARE

## 2025-06-27 ENCOUNTER — TELEPHONE (OUTPATIENT)
Dept: CARDIOLOGY | Facility: CLINIC | Age: 80
End: 2025-06-27
Payer: MEDICARE

## 2025-06-27 NOTE — TELEPHONE ENCOUNTER
----- Message from Gato Spain MD sent at 6/26/2025  8:10 PM CDT -----  Please call patient to advise that Your thyroid function is still not in normal range.  You are referred to [facility] and will contacted to offer scheduling.  ----- Message -----  From: Lab, Background User  Sent: 6/24/2025  10:25 AM CDT  To: Gato Spain MD

## 2025-06-27 NOTE — TELEPHONE ENCOUNTER
Copied from CRM #[id number]. Topic: Appointments - Appointment Scheduling  >> Jun 27, 2025  3:29 PM Krista wrote:  Type:  Sooner Apoointment Request    Caller is requesting a sooner appointment.       Name of Caller:pt    When is the first available appointment?sooner than aug    Symptoms:shortness of breath    Would the patient rather a call back or a response via [facility]? Call back    Best Call Back Number:[phone]     Additional Information: pt st she is not feeling well & was called for sooner appt/pt st she would like a sooner appt. Pt st she a rash from new meds on forehead. By right eye brow. Please call to discuss.

## 2025-06-27 NOTE — TELEPHONE ENCOUNTER
Sw the patient about her request to reduce the spirolactone to 12.5 mg every other day  due  to symptoms of Fatigue and SOB  she also stated that her weight has went down to 188.0 lb from 192.0 lb   the swelling has gone down and  she stated  BP reading were  6/26/25 149/63/88 5:49 pm                                                         144/58/79 6:51 pm                                                         140/50/79 10:12 pm                                           06/27/25 155/53/78 12:15 pm   Assigned slot on 7/11/25@1:00 pm

## 2025-06-28 DIAGNOSIS — E07.9 THYROID DYSFUNCTION: ICD-10-CM

## 2025-06-30 ENCOUNTER — TELEPHONE (OUTPATIENT)
Dept: CARDIOLOGY | Facility: CLINIC | Age: 80
End: 2025-06-30
Payer: MEDICARE

## 2025-06-30 DIAGNOSIS — E07.9 THYROID DYSFUNCTION: ICD-10-CM

## 2025-06-30 RX ORDER — ISOSORBIDE MONONITRATE 30 MG/1
TABLET, EXTENDED RELEASE ORAL
Qty: 180 TABLET | Refills: 1 | OUTPATIENT
Start: 2025-06-30

## 2025-06-30 RX ORDER — ISOSORBIDE MONONITRATE 30 MG/1
60 TABLET, EXTENDED RELEASE ORAL DAILY
Qty: 90 TABLET | Refills: 3 | Status: SHIPPED | OUTPATIENT
Start: 2025-06-30

## 2025-06-30 RX ORDER — AMLODIPINE BESYLATE 2.5 MG/1
2.5 TABLET ORAL DAILY
Qty: 90 TABLET | Refills: 3 | Status: SHIPPED | OUTPATIENT
Start: 2025-06-30 | End: 2026-06-30

## 2025-06-30 NOTE — TELEPHONE ENCOUNTER
Copied from CRM #[id number]. Topic: Medications - Medication Refill  >> Jun 30, 2025 10:35 AM Krista wrote:  Type:  RX Refill Request    Who Called: pt    Refill or New Rx:refill        RX Name and Strength: Disp Refills Start End   isosorbide mononitrate (IMDUR) 30 MG 24 hr tablet 90 tablet 3 11/14/2024              Preferred Pharmacy with phone number:    [facility]/pharmacy #[id number] - [place], [place] - [address]  [address] [place] [address]  Phone: [phone] Fax: [phone]    [facility] [address]  [address]   [facility] [address]  Phone: [phone] Fax: [phone]       Local or Mail Order:local    Ordering Provider:harman    Would the patient rather a call back or a response via [facility]? Call back    Best Call Back Number:[phone]     Additional Information: pt st she is all out of med listed. Pt st she took last pill yesterday. Pt st can office give her a call when med is submitted. Pt is [facility] 7/11. Pt st she was giving a med & inform dr she was allergic. Pt st she is not allergic to med it is fine. She is taking full dose of med. Pt st she has no side effects. spironolactone (ALDACTONE) 25 MG tablet. Pt st can office give her a call once request is submitted.   please call to discuss

## 2025-06-30 NOTE — TELEPHONE ENCOUNTER
----- Message from Med Assistant Светлана sent at 6/27/2025  4:04 PM CDT -----  Sw the patient about her request to reduce the spirolactone to 12.5 mg every other day  due  to symptoms of Fatigue and SOB  she also stated that her weight has went down to 188.0 lb from 192.0 lb   the swelling has gone down and  she stated  BP reading were  6/26/25 149/63/88 5:49 pm                                                         144/58/79 6:51 pm                                                         140/50/79 10:12 pm                                           06/27/25 155/53/78 12:15 pm   Assigned slot on 7/11/25@1:00 pm    Do you have any directive?

## 2025-07-01 ENCOUNTER — RESULTS FOLLOW-UP (OUTPATIENT)
Dept: FAMILY MEDICINE | Facility: CLINIC | Age: 80
End: 2025-07-01

## 2025-07-01 ENCOUNTER — HOSPITAL ENCOUNTER (OUTPATIENT)
Dept: RADIOLOGY | Facility: HOSPITAL | Age: 80
Discharge: HOME | End: 2025-07-01
Attending: INTERNAL MEDICINE
Payer: MEDICARE

## 2025-07-01 ENCOUNTER — TELEPHONE (OUTPATIENT)
Dept: CARDIOLOGY | Facility: CLINIC | Age: 80
End: 2025-07-01
Payer: MEDICARE

## 2025-07-01 DIAGNOSIS — R92.8 ABNORMAL MAMMOGRAM OF LEFT BREAST: ICD-10-CM

## 2025-07-01 PROCEDURE — 77061 BREAST TOMOSYNTHESIS UNI: CPT | Mod: TC,PO,LT

## 2025-07-01 PROCEDURE — 76642 ULTRASOUND BREAST LIMITED: CPT | Mod: TC,PO,LT

## 2025-07-01 PROCEDURE — 77065 DX MAMMO INCL CAD UNI: CPT | Mod: 26,LT,, | Performed by: RADIOLOGY

## 2025-07-01 PROCEDURE — 76642 ULTRASOUND BREAST LIMITED: CPT | Mod: 26,LT,, | Performed by: RADIOLOGY

## 2025-07-01 PROCEDURE — 77061 BREAST TOMOSYNTHESIS UNI: CPT | Mod: 26,LT,, | Performed by: RADIOLOGY

## 2025-07-01 NOTE — TELEPHONE ENCOUNTER
Informed the patient of directive per. Sherry Miles NP to start intake of Amlodipine 2.5 MG daily

## 2025-07-17 ENCOUNTER — OFFICE VISIT (OUTPATIENT)
Dept: CARDIOLOGY | Facility: CLINIC | Age: 80
End: 2025-07-17
Payer: MEDICARE

## 2025-07-17 VITALS
DIASTOLIC BLOOD PRESSURE: 43 MMHG | HEART RATE: 82 BPM | HEIGHT: 60 IN | SYSTOLIC BLOOD PRESSURE: 143 MMHG | WEIGHT: 187 LBS | OXYGEN SATURATION: 96 % | BODY MASS INDEX: 36.71 KG/M2

## 2025-07-17 DIAGNOSIS — R07.9 CHEST PAIN, UNSPECIFIED TYPE: ICD-10-CM

## 2025-07-17 DIAGNOSIS — J30.2 SEASONAL ALLERGIES: ICD-10-CM

## 2025-07-17 DIAGNOSIS — I10 ESSENTIAL HYPERTENSION: ICD-10-CM

## 2025-07-17 DIAGNOSIS — R06.09 DYSPNEA ON EXERTION: Primary | ICD-10-CM

## 2025-07-17 PROCEDURE — 99215 OFFICE O/P EST HI 40 MIN: CPT | Mod: PBBFAC,PN

## 2025-07-17 PROCEDURE — 99214 OFFICE O/P EST MOD 30 MIN: CPT | Mod: S$PBB,,,

## 2025-07-17 PROCEDURE — 99999 PR PBB SHADOW E&M-EST. PATIENT-LVL V: CPT | Mod: PBBFAC,,,

## 2025-07-17 RX ORDER — LOSARTAN POTASSIUM AND HYDROCHLOROTHIAZIDE 25; 100 MG/1; MG/1
1 TABLET ORAL DAILY
Qty: 90 TABLET | Refills: 3 | Status: SHIPPED | OUTPATIENT
Start: 2025-07-17

## 2025-07-17 RX ORDER — AMLODIPINE BESYLATE 5 MG/1
5 TABLET ORAL DAILY
Qty: 90 TABLET | Refills: 3 | Status: SHIPPED | OUTPATIENT
Start: 2025-07-17 | End: 2026-07-17

## 2025-07-17 RX ORDER — LORATADINE 10 MG/1
10 TABLET ORAL DAILY
Qty: 90 TABLET | Refills: 1 | Status: SHIPPED | OUTPATIENT
Start: 2025-07-17

## 2025-07-17 NOTE — TELEPHONE ENCOUNTER
Copied from CRM #[id number]. Topic: Medications - Medication Refill  >> Jul 17, 2025  9:10 AM Krista wrote:  Type:  RX Refill Request    Who Called: pt    Refill or New Rx:refill    RX Name and Strength:     Disp Refills Start End   losartan-hydrochlorothiazide 100-25 mg (HYZAAR) 100-25 mg per tablet 90 tablet 3 6/25/2024 -             Preferred Pharmacy with phone number:    [facility]/pharmacy #[id number] - [place], [place] - [address]  [address] [place] [address]  Phone: [phone] Fax: [phone]           Local or Mail Order:local    Ordering Provider:harman    Would the patient rather a call back or a response via [facility]? Call back    Best Call Back Number:[phone]     Additional Information: pt st she is out of meds & st pharm informed her they been requesting refills from dr. Ina reed. Now pt is requesting new refills from dr. Miles. please call to discuss

## 2025-07-18 NOTE — PROGRESS NOTES
Subjective:    Patient ID:  Iram Aguirre is a 79 y.o. female patient here for evaluation Hypertension (Follow up) and Results (Echo)      History of Present Illness    CHIEF COMPLAINT:  Patient presents today for follow up of dyspnea.    SHORTNESS OF BREATH:  She reports persistent dyspnea, particularly when walking outside. While walking to the cafeteria was somewhat improved, outdoor activity causes her to stop and experience significant perspiration. Stress test was normal. She appears concerned about the underlying cause of symptoms and is interested in further evaluation.    RECENT INJURY:  She recently fell backwards over wood while doing yard work, sustaining a large bruise on her buttocks and multiple cuts. She denies LOC during the fall. Her daughter expressed concern about potential cognitive changes, but she remains adamant about continuing yard work, performing tasks like shoveling and raking in small increments. She also reports localized LLE discomfort with bruising and slight discoloration after a separate incident involving a cinder block in the yard.      KIDNEY DISEASE:  She has been diagnosed with Stage III-8 renal disease and has been advised to walk 20 minutes every morning for management.    WEIGHT MANAGEMENT:  She reports intentional dietary modifications, currently consuming two meals daily: two eggs in the morning and a light meal around 7-8 PM, such as tuna. Her weight has decreased from 192 lbs in May to 189 lbs. She is motivated to continue weight management.    MEDICATIONS:  Morning medications include Losartan, HCTZ, ISOS, SPI, Amlodipine, a live supplement, and omega-3 every other day. Evening medications include Trazodone 2 tablets, Terazosin, two magnesium capsules, and Prozac.      ROS:  General: -fever, -chills, -fatigue, -weight gain, -weight loss, +excessive sweating  ENT: -ear pain, -nasal congestion, -sore throat, +ear pressure  Cardiovascular: -chest pain,  -palpitations, +lower extremity edema  Respiratory: -cough, -shortness of breath, +exertional dyspnea  Gastrointestinal: -abdominal pain, -nausea, -vomiting, -diarrhea, -constipation, -blood in stool  Genitourinary: -dysuria, -hematuria, -frequency  Musculoskeletal: -joint pain, -muscle pain, +limb pain  Skin: -rash, -lesion, +easy bruising  Neurological: -headache, -dizziness, -numbness, -tingling  Psychiatric: -anxiety, -depression, -sleep difficulty                    Review of patient's allergies indicates:   Allergen Reactions    Cortisone Shortness Of Breath     Causes heart to race    Hair formula [mv,iron,min-folic acid-biotin]      dye    Levaquin [levofloxacin] Other (See Comments)     Tongue swelling and blisters    Codeine Nausea Only    Doxycycline      Other reaction(s): Unknown       Past Medical History:   Diagnosis Date    AR (allergic rhinitis) 11/29/2012    Aspergillosis     1992    Benign hypertension     Depression     Diverticulosis 2005    Esophageal polyp     Fibromyalgia     Gastric ulcer; benign 2007    GERD (gastroesophageal reflux disease) 11/29/2012    Hypercholesteremia     Hypothyroidism     Kidney stones     MTHFR gene mutation     per daughter E72.8    Obstructive sleep apnea on CPAP     Osteopenia     Rectal polyp     Skin cancer     pre skin cancer skin    Vulvovaginal melanosis      Past Surgical History:   Procedure Laterality Date    CHOLECYSTECTOMY  08/08/2012    CHOLECYSTECTOMY      COLONOSCOPY N/A 02/06/2017    Procedure: COLONOSCOPY;  Surgeon: Arturo Crane MD;  Location: [facility];  Service: Endoscopy;  Laterality: N/A;    COSMETIC SURGERY  2024    Eyelids lifted    EYE SURGERY  2005    Tear duct    FOOT FRACTURE SURGERY  2005    plate in right foot    GANGLION CYST EXCISION  1980    left wrist    HYSTERECTOMY      KNEE ARTHROSCOPY W/ ACL RECONSTRUCTION      bilateral     NASAL SEPTUM SURGERY      OOPHORECTOMY      TEAR DUCT SURGERY  2006    left eye    TOTAL VAGINAL  HYSTERECTOMY  age 31    w/BSO    TUBAL LIGATION  age 26     Social History[1]                Objective        Vitals:    07/17/25 1537   BP: (!) 143/43   Pulse:        LIPIDS - LAST 2   Lab Results   Component Value Date    CHOL 234 (H) 07/02/2024    CHOL 262 (H) 09/19/2022    HDL 64 07/02/2024    HDL 74 09/19/2022    LDLCALC 152.6 07/02/2024    LDLCALC 174.4 (H) 09/19/2022    TRIG 87 07/02/2024    TRIG 68 09/19/2022    CHOLHDL 27.4 07/02/2024    CHOLHDL 28.2 09/19/2022       CBC - LAST 2  Lab Results   Component Value Date    WBC 7.95 08/01/2024    WBC 5.70 07/02/2024    RBC 4.02 08/01/2024    RBC 3.80 (L) 07/02/2024    HGB 12.4 08/01/2024    HGB 11.8 (L) 07/02/2024    HCT 37.7 08/01/2024    HCT 37.6 07/02/2024    MCV 94 08/01/2024    MCV 99 (H) 07/02/2024    MCH 30.8 08/01/2024    MCH 31.1 (H) 07/02/2024    MCHC 32.9 08/01/2024    MCHC 31.4 (L) 07/02/2024    RDW 12.9 08/01/2024    RDW 13.5 07/02/2024     08/01/2024     07/02/2024    MPV 9.7 08/01/2024    MPV 9.8 07/02/2024    GRAN 4.3 08/01/2024    GRAN 53.5 08/01/2024    LYMPH 2.7 08/01/2024    LYMPH 33.8 08/01/2024    MONO 0.8 08/01/2024    MONO 9.7 08/01/2024    BASO 0.05 08/01/2024    BASO 0.05 07/02/2024    NRBC 0 08/01/2024    NRBC 0 07/02/2024       CHEMISTRY & LIVER FUNCTION - LAST 2  Lab Results   Component Value Date     06/11/2025     02/12/2025    K 3.7 06/11/2025    K 3.6 02/12/2025     06/11/2025     02/12/2025    CO2 25 06/11/2025    CO2 27 02/12/2025    ANIONGAP 11 06/11/2025    ANIONGAP 11 02/12/2025    BUN 14 06/11/2025    BUN 16 02/12/2025    CREATININE 1.0 06/11/2025    CREATININE 1.0 02/12/2025     06/11/2025    GLU 99 02/12/2025    CALCIUM 9.7 06/11/2025    CALCIUM 10.0 02/12/2025    MG 1.8 11/08/2019    ALBUMIN 3.9 06/11/2025    ALBUMIN 3.9 02/12/2025    PROT 7.1 06/11/2025    PROT 7.4 02/12/2025    ALKPHOS 60 06/11/2025    ALKPHOS 65 02/12/2025    ALT 16 06/11/2025    ALT 20 02/12/2025    AST 17  06/11/2025    AST 25 02/12/2025    BILITOT 0.7 06/11/2025    BILITOT 0.7 02/12/2025        CARDIAC PROFILE - LAST 2  Lab Results   Component Value Date    BNP 12 06/11/2025    BNP 10 09/15/2022    CPK 68 03/24/2021     03/24/2021    TROPONINI <0.030 03/24/2021    TROPONINI <0.030 11/08/2019        COAGULATION - LAST 2  Lab Results   Component Value Date    LABPT 13.8 11/07/2019    INR 1.2 09/20/2021    INR 1.1 11/07/2019       ENDOCRINE & PSA - LAST 2  Lab Results   Component Value Date    HGBA1C 5.7 (H) 02/12/2025    HGBA1C 5.5 07/02/2024    TSH 0.213 (L) 06/24/2025    TSH 7.293 (H) 04/21/2025    PROCAL <0.05 03/24/2021        ECHOCARDIOGRAM RESULTS  Results for orders placed during the hospital encounter of 06/24/25    Echo    Interpretation Summary    Left Ventricle: The left ventricle is normal in size. Normal wall thickness. There is concentric remodeling. Normal wall motion. There is normal systolic function with a visually estimated ejection fraction of 60 - 65%. There is normal diastolic function.    Right Ventricle: The right ventricle is normal in size measuring 2.2 cm. Wall thickness is normal. Systolic function is normal.    Aortic Valve: There is moderate aortic valve sclerosis. Moderately calcified noncoronary cusp. There is moderate annular calcification present. Moderately restricted motion. Aortic valve peak velocity is 2.4 m/s.Peak aortic valve gradient 23 mm is noted.  Mean gradient is 13 mmHg.    Mitral Valve: There is moderate posterior leaflet sclerosis.    IVC/SVC: Normal venous pressure at 3 mmHg.      CURRENT/PREVIOUS VISIT EKG  Results for orders placed or performed in visit on 06/11/25   IN OFFICE EKG 12-LEAD (to [place])    Collection Time: 06/11/25  1:55 PM   Result Value Ref Range    QRS Duration 124 ms    [facility] QTC Calculation 465 ms    Narrative    Test Reason : R07.9,    Vent. Rate :  69 BPM     Atrial Rate :  69 BPM     P-R Int : 148 ms          QRS Dur : 124 ms      QT Int : 434  ms       P-R-T Axes :  40  33  30 degrees    QTcB Int : 465 ms    Normal sinus rhythm  Right bundle branch block  Abnormal ECG  When compared with ECG of 14-Nov-2024 09:45,  No significant change was found  Confirmed by Real Patrick ([id number]) on 6/17/2025 9:33:25 PM    Referred By:            Confirmed By: Real Patrick     No valid procedures specified.   Results for orders placed during the hospital encounter of 11/26/24    Nuclear Stress - Cardiology Interpreted    Interpretation Summary    Normal myocardial perfusion scan. There is no evidence of myocardial ischemia or infarction.    The gated perfusion images showed an ejection fraction of 69% at rest. The gated perfusion images showed an ejection fraction of 70% post stress. Normal ejection fraction is greater than 53%.    There is normal wall motion at rest and post-stress.    LV cavity size is normal at rest and normal at post-stress.    The ECG portion of the study is abnormal but not diagnostic due to resting ST-T abnormalities.    The patient reported no chest pain during the stress test.    There were no arrhythmias during stress.    No valid procedures specified.    Physical Exam    Vitals: Blood pressure: 149/58.  General: No acute distress. Well-developed. Well-nourished.  Eyes: EOMI. Sclerae anicteric.  HENT: Normocephalic. Atraumatic.   Cardiovascular: Regular rate. Regular rhythm. + murmurs. No rubs. No gallops. Normal S1, S2.  Respiratory: Normal respiratory effort. Clear to auscultation bilaterally. No rales. No rhonchi. No wheezing.  Abdomen: Soft. Non-tender. Non-distended. Normoactive bowel sounds.  Musculoskeletal: No  obvious deformity.  Extremities: Bilateral lower extremity edema, right worse than left.  Neurological: Alert & oriented x3. No slurred speech. Normal gait.  Psychiatric: Normal mood. Normal affect. Good insight. Good judgment.  Skin: Warm. Dry. No rash.         I HAVE REVIEWED :    The vital signs, nurses notes, and all the  pertinent radiology and labs.        Current Outpatient Medications   Medication Instructions    amLODIPine (NORVASC) 5 mg, Oral, Daily    azelastine (ASTELIN) 137 mcg, Nasal, Daily    cholecalciferol, vitamin D3, 125 mcg (5,000 unit) Tab 1 tablet, Every Mon, Wed, Fri    coenzyme Q10 100 mg, Daily    FLUoxetine 20 mg, Oral, Daily    fluticasone propionate (FLONASE) 50 mcg, Each Nostril, Daily PRN    isosorbide mononitrate (IMDUR) 60 mg, Oral, Daily    Lactobacillus rhamnosus GG (CULTURELLE) 10 billion cell capsule 1 capsule, Daily    levomefolate calcium (ELFOLATE) 15 mg, Oral, Daily    levothyroxine (TIROSINT) 100 mcg Cap Take one cap po daily first thing in the morning on an empty stomach.  Wait to consume food, drinks and other medication for an hour after taking this medication.    loratadine (CLARITIN) 10 mg, Oral, Daily    losartan-hydrochlorothiazide 100-25 mg (HYZAAR) 100-25 mg per tablet 1 tablet, Oral, Daily    MAGNESIUM L-THREONATE ORAL 144 mg    mv-mn/folic acid/vit [id number] (ALIVE ONCE DAILY WOMEN 50 PLUS ORAL) 1 tablet, Daily    spironolactone (ALDACTONE) 25 mg, Oral, Daily    terazosin (HYTRIN) 5 mg, Oral, Nightly, Take 5 mg + 2 mg cap po nightly    terazosin (HYTRIN) 2 mg, Oral, Nightly, Take 5 mg + 2 mg cap po nightly    turmeric, bulk, 100 % Powd 1 capsule, Daily          Assessment & Plan   Assessment & Plan    I10 Essential hypertension  R06.09 Dyspnea on exertion  R07.9 Chest pain, unspecified type    PLAN SUMMARY:  - Ordered PET stress test at [facility] to evaluate shortness of breath etiology  - Ordered kidney ultrasound to rule out Renal Artery Stenosis  - Increased amlodipine to 5 mg for better blood pressure control  - Continued losartan, spironolactone, terazosin, HCTZ, and isosorbide  - Take morning and evening walks for exercise and joint health  - Limit salt intake to reduce swelling  - Continue efforts to lose weight through diet and exercise  - Follow up in 3 months  - Will  schedule earlier appointment if PET stress test is positive  - Will send message via patient portal if PET stress test is negative    ESSENTIAL HYPERTENSION:  - Increased amlodipine to 5 mg for better blood pressure control.  - Continued losartan-HCTZ 100-25 mg daily.  - Continued terazosin 7 mg daily.  - Continued spironolactone 25 mg daily.  - Limit salt intake to reduce swelling.  - US to rule out renal artery stenosis in setting of persistent HTN  - Monitor for increased redness, swelling, or tenderness in legs.    DYSPNEA ON EXERTION:  - Ordered PET stress test at [facility] to evaluate shortness of breath etiology.  - Will send message via patient portal if PET stress test is negative.  - Will schedule earlier appointment if PET stress test is positive.    CHEST PAIN, UNSPECIFIED TYPE:  - Aortic valve stenosis considered; planning to monitor with repeat echo in 6 months.  - Discussed aortic valve anatomy and potential progression to aortic valve stenosis.  - Continued isosorbide mononitrate 30 mg daily.  - Ordered PET stress test at [facility] to evaluate shortness of breath etiology.  - Will send message via patient portal if PET stress test is negative.  - Will schedule earlier appointment if PET stress test is positive.  - Additional recommendations: - Continue efforts to lose weight through diet and exercise. - Take morning and evening walks for exercise and joint health. - Follow up in 3 months. - Contact office if any problems before next visit.        This note was generated with the assistance of ambient listening technology. Verbal consent was obtained by the patient and accompanying visitor(s) for the recording of patient appointment to facilitate this note. I attest to having reviewed and edited the generated note for accuracy, though some syntax or spelling errors may persist. Please contact the author of this note for any clarification.             [1]   Social History  Tobacco Use    Smoking  status: Never    Smokeless tobacco: Never   Substance Use Topics    Alcohol use: No    Drug use: No

## 2025-07-21 ENCOUNTER — HOSPITAL ENCOUNTER (OUTPATIENT)
Dept: RADIOLOGY | Facility: HOSPITAL | Age: 80
Discharge: HOME | End: 2025-07-21
Payer: MEDICARE

## 2025-07-21 DIAGNOSIS — I10 ESSENTIAL HYPERTENSION: ICD-10-CM

## 2025-07-21 PROCEDURE — 76770 US EXAM ABDO BACK WALL COMP: CPT | Mod: TC,PO

## 2025-07-21 PROCEDURE — 93975 VASCULAR STUDY: CPT | Mod: 26,,, | Performed by: RADIOLOGY

## 2025-07-21 PROCEDURE — 76770 US EXAM ABDO BACK WALL COMP: CPT | Mod: 26,59,, | Performed by: RADIOLOGY

## 2025-07-23 ENCOUNTER — HOSPITAL ENCOUNTER (OUTPATIENT)
Dept: CARDIOLOGY | Facility: HOSPITAL | Age: 80
Discharge: HOME | End: 2025-07-23
Payer: MEDICARE

## 2025-07-23 VITALS
HEIGHT: 60 IN | HEART RATE: 82 BPM | SYSTOLIC BLOOD PRESSURE: 119 MMHG | DIASTOLIC BLOOD PRESSURE: 32 MMHG | WEIGHT: 187 LBS | BODY MASS INDEX: 36.71 KG/M2

## 2025-07-23 DIAGNOSIS — R06.09 DYSPNEA ON EXERTION: ICD-10-CM

## 2025-07-23 DIAGNOSIS — R07.9 CHEST PAIN, UNSPECIFIED TYPE: ICD-10-CM

## 2025-07-23 LAB
CFR FLOW - ANTERIOR: 1.3
CFR FLOW - INFERIOR: 1.48
CFR FLOW - LATERAL: 1.27
CFR FLOW - MAX: 1.74
CFR FLOW - MIN: 0.92
CFR FLOW - SEPTAL: 1.25
CFR FLOW - WHOLE HEART: 1.33
CV STRESS BASE HR: 74 BPM
DIASTOLIC BLOOD PRESSURE: 50 MMHG
EJECTION FRACTION- HIGH: 65 %
END DIASTOLIC INDEX-HIGH: 153 ML/M2
END DIASTOLIC INDEX-LOW: 93 ML/M2
END SYSTOLIC INDEX-HIGH: 71 ML/M2
END SYSTOLIC INDEX-LOW: 31 ML/M2
NUC REST DIASTOLIC VOLUME INDEX: 81
NUC REST EJECTION FRACTION: 77
NUC REST SYSTOLIC VOLUME INDEX: 19
NUC STRESS DIASTOLIC VOLUME INDEX: 93
NUC STRESS EJECTION FRACTION: 70 %
NUC STRESS SYSTOLIC VOLUME INDEX: 28
OHS CV CPX 1 MINUTE RECOVERY HEART RATE: 99 BPM
OHS CV CPX 85 PERCENT MAX PREDICTED HEART RATE MALE: 121
OHS CV CPX MAX PREDICTED HEART RATE: 142
OHS CV CPX PATIENT HEIGHT IN: 60
OHS CV CPX PATIENT IS FEMALE: 1
OHS CV CPX PATIENT IS MALE: 0
OHS CV CPX PEAK DIASTOLIC BLOOD PRESSURE: 32 MMHG
OHS CV CPX PEAK HEAR RATE: 82 BPM
OHS CV CPX PEAK RATE PRESSURE PRODUCT: 9758
OHS CV CPX PEAK SYSTOLIC BLOOD PRESSURE: 119 MMHG
OHS CV CPX PERCENT MAX PREDICTED HEART RATE ACHIEVED: 60
OHS CV CPX RATE PRESSURE PRODUCT PRESENTING: NORMAL
OHS CV INITIAL DOSE: 25.8 MCG/KG/MIN
OHS CV MODERATELY REDUCED FLOW CAPACITY: 10 %
OHS CV NO ISCHEMIA MILDLY REDUCED FLOW CAPACTY: 81 %
OHS CV NO ISCHEMIA MINIMALLY REDUCED FLOW CAPACITY: 8 %
OHS CV NORMAL FLOW CAPACITY COMPARABLE TO HEALTHY YOUNG VOLUNTEERS: 0 %
OHS CV PEAK DOSE: 24.9 MCG/KG/MIN
OHS CV PET ID: 9382
OHS CV SEVERELY REDUCED FLOW CAPACITY LARGEST SINGLE CONTINUOUS REGION: 0 %
OHS CV SEVERELY REDUCED FLOW CAPACITY: 0 %
OHS CV TOTAL EXAM DLP: 472.52 MGY-CM
REST FLOW - ANTERIOR: 1.08 CC/MIN/G
REST FLOW - INFERIOR: 1.15 CC/MIN/G
REST FLOW - LATERAL: 1.27 CC/MIN/G
REST FLOW - MAX: 1.5 CC/MIN/G
REST FLOW - MIN: 0.72 CC/MIN/G
REST FLOW - SEPTAL: 1.03 CC/MIN/G
REST FLOW - WHOLE HEART: 1.13 CC/MIN/G
RETIRED EF AND QEF - SEE NOTES: 53 %
STRESS FLOW - ANTERIOR: 1.4 CC/MIN/G
STRESS FLOW - INFERIOR: 1.72 CC/MIN/G
STRESS FLOW - LATERAL: 1.62 CC/MIN/G
STRESS FLOW - MAX: 2.17 CC/MIN/G
STRESS FLOW - MIN: 0.74 CC/MIN/G
STRESS FLOW - SEPTAL: 1.29 CC/MIN/G
STRESS FLOW - WHOLE HEART: 1.51 CC/MIN/G
SYSTOLIC BLOOD PRESSURE: 141 MMHG

## 2025-07-23 PROCEDURE — A9555 RB82 RUBIDIUM: HCPCS

## 2025-07-23 PROCEDURE — 93016 CV STRESS TEST SUPVJ ONLY: CPT | Mod: ,,, | Performed by: INTERNAL MEDICINE

## 2025-07-23 PROCEDURE — 93017 CV STRESS TEST TRACING ONLY: CPT

## 2025-07-23 PROCEDURE — 78431 MYOCRD IMG PET RST&STRS CT: CPT | Mod: 26,,, | Performed by: INTERNAL MEDICINE

## 2025-07-23 PROCEDURE — 93018 CV STRESS TEST I&R ONLY: CPT | Mod: ,,, | Performed by: INTERNAL MEDICINE

## 2025-07-23 PROCEDURE — 63600175 PHARM REV CODE 636 W HCPCS

## 2025-07-23 RX ORDER — REGADENOSON 0.08 MG/ML
0.4 INJECTION, SOLUTION INTRAVENOUS ONCE
Status: COMPLETED | OUTPATIENT
Start: 2025-07-23 | End: 2025-07-23

## 2025-07-23 RX ORDER — AMINOPHYLLINE 25 MG/ML
75 INJECTION, SOLUTION INTRAVENOUS ONCE
Status: COMPLETED | OUTPATIENT
Start: 2025-07-23 | End: 2025-07-23

## 2025-07-23 RX ADMIN — AMINOPHYLLINE 75 MG: 25 INJECTION, SOLUTION INTRAVENOUS at 12:07

## 2025-07-23 RX ADMIN — REGADENOSON 0.4 MG: 0.08 INJECTION, SOLUTION INTRAVENOUS at 12:07

## 2025-07-23 RX ADMIN — RUBIDIUM CHLORIDE RB-82 25.8 MILLICURIE: 150 INJECTION, SOLUTION INTRAVENOUS at 12:07

## 2025-07-23 RX ADMIN — RUBIDIUM CHLORIDE RB-82 24.9 MILLICURIE: 150 INJECTION, SOLUTION INTRAVENOUS at 12:07

## 2025-07-25 ENCOUNTER — TELEPHONE (OUTPATIENT)
Dept: CARDIOLOGY | Facility: CLINIC | Age: 80
End: 2025-07-25
Payer: MEDICARE

## 2025-07-25 DIAGNOSIS — R94.39 ABNORMAL CARDIOVASCULAR STRESS TEST: Primary | ICD-10-CM

## 2025-07-25 DIAGNOSIS — R79.1 ABNORMAL COAGULATION PROFILE: ICD-10-CM

## 2025-07-25 RX ORDER — ASPIRIN 81 MG/1
81 TABLET ORAL DAILY
Qty: 90 TABLET | Refills: 3 | Status: SHIPPED | OUTPATIENT
Start: 2025-07-25 | End: 2026-07-25

## 2025-07-25 RX ORDER — EZETIMIBE 10 MG/1
10 TABLET ORAL DAILY
Qty: 90 TABLET | Refills: 3 | Status: SHIPPED | OUTPATIENT
Start: 2025-07-25 | End: 2026-07-25

## 2025-07-25 RX ORDER — DIPHENHYDRAMINE HCL 50 MG
50 CAPSULE ORAL
OUTPATIENT
Start: 2025-07-25

## 2025-07-25 RX ORDER — SODIUM CHLORIDE 9 MG/ML
INJECTION, SOLUTION INTRAVENOUS ONCE
OUTPATIENT
Start: 2025-07-25 | End: 2025-07-25

## 2025-07-25 NOTE — TELEPHONE ENCOUNTER
Sw the patient about scan and informed her of results per. Sherry Miles NP          Assigned slot f/u after scan 8/7/25 @ 1:30 pm

## 2025-07-25 NOTE — TELEPHONE ENCOUNTER
Copied from CRM #[id number]. Topic: General Inquiry - Patient Advice  >> Jul 25, 2025  8:52 AM Stefania wrote:  Type:  Needs Medical Advice    Who Called: pt     Best Call Back Number: [phone]    Additional Information: pt st she needs a callback to discuss her pet scan results, and see if she needs to have sx. Please call to discuss.

## 2025-07-28 ENCOUNTER — TELEPHONE (OUTPATIENT)
Dept: CARDIOLOGY | Facility: CLINIC | Age: 80
End: 2025-07-28
Payer: MEDICARE

## 2025-07-28 ENCOUNTER — TELEPHONE (OUTPATIENT)
Dept: FAMILY MEDICINE | Facility: CLINIC | Age: 80
End: 2025-07-28
Payer: MEDICARE

## 2025-07-28 NOTE — TELEPHONE ENCOUNTER
Patient called wanting to schedule an appointment with Dr. House due to her wanting a second opinion on a procedure that she has scheduled. Patient is scheduled.

## 2025-07-28 NOTE — TELEPHONE ENCOUNTER
Copied from CRM #[id number]. Topic: Appointments - Appointment Scheduling  >> Jul 28, 2025 12:23 PM Olayinka wrote:  Type: Patient Call Back    Who called:self    What is the request in detail: pt is having heart surgery on August 19th  and needs to be seen prior to that. First appt scheduled 10/01/25    Can the clinic reply by [facility]?no    Would the patient rather a call back or a response via [facility]? call    Best call back number: [phone]      Additional Information:

## 2025-07-29 ENCOUNTER — TELEPHONE (OUTPATIENT)
Dept: CARDIOLOGY | Facility: CLINIC | Age: 80
End: 2025-07-29
Payer: MEDICARE

## 2025-07-29 ENCOUNTER — OFFICE VISIT (OUTPATIENT)
Dept: CARDIOLOGY | Facility: CLINIC | Age: 80
End: 2025-07-29
Payer: MEDICARE

## 2025-07-29 ENCOUNTER — TELEPHONE (OUTPATIENT)
Dept: FAMILY MEDICINE | Facility: CLINIC | Age: 80
End: 2025-07-29
Payer: MEDICARE

## 2025-07-29 VITALS
OXYGEN SATURATION: 98 % | HEART RATE: 101 BPM | SYSTOLIC BLOOD PRESSURE: 138 MMHG | BODY MASS INDEX: 36.7 KG/M2 | RESPIRATION RATE: 19 BRPM | HEIGHT: 60 IN | DIASTOLIC BLOOD PRESSURE: 56 MMHG | WEIGHT: 186.94 LBS

## 2025-07-29 DIAGNOSIS — E78.5 HYPERLIPIDEMIA LDL GOAL <70: ICD-10-CM

## 2025-07-29 DIAGNOSIS — I27.20 PULMONARY HYPERTENSION: ICD-10-CM

## 2025-07-29 DIAGNOSIS — I50.32 CHRONIC DIASTOLIC HEART FAILURE: ICD-10-CM

## 2025-07-29 DIAGNOSIS — E66.812 CLASS 2 OBESITY WITH ALVEOLAR HYPOVENTILATION, SERIOUS COMORBIDITY, AND BODY MASS INDEX (BMI) OF 36.0 TO 36.9 IN ADULT: ICD-10-CM

## 2025-07-29 DIAGNOSIS — G47.33 OBSTRUCTIVE SLEEP APNEA ON CPAP: ICD-10-CM

## 2025-07-29 DIAGNOSIS — R06.09 DYSPNEA ON EXERTION: Primary | ICD-10-CM

## 2025-07-29 DIAGNOSIS — E66.2 CLASS 2 OBESITY WITH ALVEOLAR HYPOVENTILATION, SERIOUS COMORBIDITY, AND BODY MASS INDEX (BMI) OF 36.0 TO 36.9 IN ADULT: ICD-10-CM

## 2025-07-29 DIAGNOSIS — I35.0 NONRHEUMATIC AORTIC VALVE STENOSIS: ICD-10-CM

## 2025-07-29 DIAGNOSIS — I25.119 CORONARY ARTERY DISEASE INVOLVING NATIVE CORONARY ARTERY OF NATIVE HEART WITH ANGINA PECTORIS: ICD-10-CM

## 2025-07-29 DIAGNOSIS — I70.0 AORTIC ATHEROSCLEROSIS: ICD-10-CM

## 2025-07-29 DIAGNOSIS — I10 ESSENTIAL HYPERTENSION: ICD-10-CM

## 2025-07-29 PROCEDURE — 99999 PR PBB SHADOW E&M-EST. PATIENT-LVL V: CPT | Mod: PBBFAC,,, | Performed by: INTERNAL MEDICINE

## 2025-07-29 PROCEDURE — 99215 OFFICE O/P EST HI 40 MIN: CPT | Mod: PBBFAC | Performed by: INTERNAL MEDICINE

## 2025-07-29 PROCEDURE — 99204 OFFICE O/P NEW MOD 45 MIN: CPT | Mod: S$PBB,,, | Performed by: INTERNAL MEDICINE

## 2025-07-29 PROCEDURE — G2211 COMPLEX E/M VISIT ADD ON: HCPCS | Mod: ,,, | Performed by: INTERNAL MEDICINE

## 2025-07-29 RX ORDER — FUROSEMIDE 20 MG/1
20 TABLET ORAL DAILY
Qty: 30 TABLET | Refills: 11 | Status: SHIPPED | OUTPATIENT
Start: 2025-07-29 | End: 2026-07-29

## 2025-07-29 NOTE — TELEPHONE ENCOUNTER
Copied from CRM #[id number]. Topic: General Inquiry - Return Call  >> Jul 29, 2025  9:50 AM Olayinka wrote:  Type:  Patient Returning Call    Who Called: self    Who Left Message for Patient: Jen Jimenez MA    Does the patient know what this is regarding?:Wants an appt scheduled for this afternoon at 3pm. First appt populated was October which patient declined.     Would the patient rather a call back or a response via [facility]? call    Best Call Back Number:[phone]      Additional Information:

## 2025-07-29 NOTE — TELEPHONE ENCOUNTER
----- Message from Sherry Miles NP sent at 7/24/2025  4:22 PM CDT -----  Recommend cardiac catheterization. I will place orders.    Attempted to call the patient twice and her daughter but neither had voicemail confirming their name.  Would like for the patient and daughter to both come in to discuss with me next week.  This can even be a nurse visit to sign the papers and I will come over and see.  ----- Message -----  From: Daniele Vicente MD  Sent: 7/23/2025   2:38 PM CDT  To: Sherry Miles NP

## 2025-07-29 NOTE — PROGRESS NOTES
CARDIOLOGY CLINIC VISIT        HISTORY OF PRESENT ILLNESS:     07/29/2025: Iram Aguirre presents for evaluation of shortness of breath. Has had symptoms since having COVID. She had extensive cardiac as well as Pulmonary evaluations.  Symptoms with minimal exertion.  Recent PET stress showed severe diffuse coronary calcifications in multivessel distribution.  No evidence of ischemia or scar.  Diffuse subendocardial ischemia related to diastolic dysfunction.  Medication regimen recently adjusted.    CARDIOVASCULAR HISTORY:     Coronary calcifications on CAT scan  Chronic diastolic heart failure  Pulmonary hypertension  Aortic atherosclerosis    PAST MEDICAL HISTORY:     Past Medical History:   Diagnosis Date    AR (allergic rhinitis) 11/29/2012    Aspergillosis     1992    Benign hypertension     Depression     Diverticulosis 2005    Esophageal polyp     Fibromyalgia     Gastric ulcer; benign 2007    GERD (gastroesophageal reflux disease) 11/29/2012    Hypercholesteremia     Hypothyroidism     Kidney stones     MTHFR gene mutation     per daughter E72.8    Obstructive sleep apnea on CPAP     Osteopenia     Rectal polyp     Skin cancer     pre skin cancer skin    Vulvovaginal melanosis        PAST SURGICAL HISTORY:     Past Surgical History:   Procedure Laterality Date    CHOLECYSTECTOMY  08/08/2012    CHOLECYSTECTOMY      COLONOSCOPY N/A 02/06/2017    Procedure: COLONOSCOPY;  Surgeon: Arturo Crane MD;  Location: [facility];  Service: Endoscopy;  Laterality: N/A;    COSMETIC SURGERY  2024    Eyelids lifted    EYE SURGERY  2005    Tear duct    FOOT FRACTURE SURGERY  2005    plate in right foot    GANGLION CYST EXCISION  1980    left wrist    HYSTERECTOMY      KNEE ARTHROSCOPY W/ ACL RECONSTRUCTION      bilateral     NASAL SEPTUM SURGERY      OOPHORECTOMY      TEAR DUCT SURGERY  2006    left eye    TOTAL VAGINAL HYSTERECTOMY  age 31    w/BSO    TUBAL LIGATION  age 26       ALLERGIES AND MEDICATION:     Review of  patient's allergies indicates:   Allergen Reactions    Cortisone Shortness Of Breath     Causes heart to race    Hair formula [mv,iron,min-folic acid-biotin]      dye    Levaquin [levofloxacin] Other (See Comments)     Tongue swelling and blisters    Codeine Nausea Only    Doxycycline      Other reaction(s): Unknown        Medication List            Accurate as of July 29, 2025  3:03 PM. If you have any questions, ask your nurse or doctor.                START taking these medications      furosemide 20 MG tablet  Commonly known as: LASIX  Take 1 tablet (20 mg total) by mouth once daily.  Started by: Jacob House MD            CONTINUE taking these medications      ALIVE ONCE DAILY WOMEN 50 PLUS ORAL     amLODIPine 5 MG tablet  Commonly known as: NORVASC  Take 1 tablet (5 mg total) by mouth once daily.     aspirin 81 MG EC tablet  Commonly known as: ECOTRIN  Take 1 tablet (81 mg total) by mouth once daily.     azelastine 137 mcg (0.1 %) nasal spray  Commonly known as: ASTELIN  1 spray (137 mcg total) by Nasal route once daily.     cholecalciferol (vitamin D3) 125 mcg (5,000 unit) Tab     coenzyme Q10 100 mg capsule     ezetimibe 10 mg tablet  Commonly known as: ZETIA  Take 1 tablet (10 mg total) by mouth once daily.     FLUoxetine 20 MG capsule  Take 1 capsule (20 mg total) by mouth once daily.     fluticasone propionate 50 mcg/actuation nasal spray  Commonly known as: FLONASE  1 spray (50 mcg total) by Each Nostril route daily as needed for Rhinitis or Allergies.     isosorbide mononitrate 30 MG 24 hr tablet  Commonly known as: IMDUR  Take 2 tablets (60 mg total) by mouth once daily.     Lactobacillus rhamnosus GG 10 billion cell capsule  Commonly known as: CULTURELLE     levothyroxine 100 mcg Cap  Commonly known as: TIROSINT  Take one cap po daily first thing in the morning on an empty stomach.  Wait to consume food, drinks and other medication for an hour after taking this medication.     loratadine 10 mg  tablet  Commonly known as: CLARITIN  Take 1 tablet (10 mg total) by mouth once daily.     losartan-hydrochlorothiazide 100-25 mg 100-25 mg per tablet  Commonly known as: HYZAAR  Take 1 tablet by mouth once daily.     MAGNESIUM L-THREONATE ORAL     spironolactone 25 MG tablet  Commonly known as: ALDACTONE  Take 1 tablet (25 mg total) by mouth once daily.     * terazosin 5 MG capsule  Commonly known as: HYTRIN  Take 1 capsule (5 mg total) by mouth every evening. Take 5 mg + 2 mg cap po nightly     * terazosin 2 MG capsule  Commonly known as: HYTRIN  Take 1 capsule (2 mg total) by mouth every evening. Take 5 mg + 2 mg cap po nightly     turmeric (bulk) 100 % Powd           * This list has 2 medication(s) that are the same as other medications prescribed for you. Read the directions carefully, and ask your doctor or other care provider to review them with you.                   Where to Get Your Medications        These medications were sent to [facility]/pharm[facility] #[id number] - [place] - [address]  [address] [address]      Phone: [phone]   furosemide 20 MG tablet         SOCIAL HISTORY:   Social History[1]    FAMILY HISTORY:     Family History   Problem Relation Name Age of Onset    Kidney disease Mother Anabela Macedo ( )     Depression Mother Anabela Macedo ( )     Hypertension Mother Anabela Macedo ( )     Heart disease Father Reginaldo W Maradiaga III ( )     Arthritis Father Reginaldo W Maradiaga III ( )     Hearing loss Father Reginaldo W Maradiaga III ( )     Hyperlipidemia Father Edjustice W Maradiaga III ( )     Stroke Father Reginaldo W Maradiaga III ( )     Lymphoma Sister Nora Patino Wordt     Arthritis Sister Nora Patino Wordt     Heart disease Sister Nora Patino Wordt     Bladder Cancer Brother Yonathan Maradiaga     Hearing loss Brother Yonathan Maradiaga     Leukemia Brother Reginaldo Maradiaga IV     Arthritis Brother Reginaldo Maradiaga IV     Cancer Brother Reginaldo Maradiaga  IV     Depression Brother Reginaldo Maradiaga IV     Hearing loss Brother Reginaldo Maradiaga IV     Hyperlipidemia Brother Edjustice Maradiaga IV     Mental illness Brother Edjustice Maradiaga IV     Cancer Brother Eric Maradiaga     Depression Brother Eric Maradiaga     Hearing loss Brother Eric Maradiaga     Hyperlipidemia Brother Eric Maradiaga     Hypertension Brother Eric Maradiaga     Arthritis Sister Blanca Ocampo ()     Cancer Sister Blanca Ocampo ()     Depression Sister Blanca Ocampo ()     Hypertension Sister Blanca Ocampo ()     Mental illness Sister Blanca Ocampo ()     Arthritis Sister Maria Del Rosario Paredes ()     Cancer Sister Maria Del Rosario Paredes ()     Depression Sister Maria Del Rosario Paredes ()     Hearing loss Sister Maria Del Rosario Paredes ()     Hyperlipidemia Sister Maria Del Rosario Paredes ()     Arthritis Daughter Monalisa Harris     Arthritis Paternal Aunt Meghna Gurrola     Cancer Brother Juan Manuel Maradiaga     Depression Brother Juan Manuel Maradiaga     COPD Sister Ita Amaya ( )        REVIEW OF SYSTEMS:   Review of Systems   Constitutional:  Negative for chills, diaphoresis, fever, malaise/fatigue and weight loss.   HENT:  Negative for congestion, hearing loss, sinus pain, sore throat and tinnitus.    Eyes:  Negative for blurred vision, double vision, photophobia and pain.   Respiratory:  Positive for shortness of breath. Negative for cough, hemoptysis, sputum production, wheezing and stridor.    Cardiovascular:  Negative for chest pain, palpitations, orthopnea, claudication, leg swelling and PND.   Gastrointestinal:  Negative for abdominal pain, blood in stool, heartburn, melena, nausea and vomiting.   Musculoskeletal:  Negative for back pain, falls, joint pain, myalgias and neck pain.   Neurological:  Negative for dizziness, tingling, tremors, sensory change, speech change, focal weakness, seizures, loss of consciousness, weakness and headaches.   Endo/Heme/Allergies:  Does not bruise/bleed  easily.   Psychiatric/Behavioral:  Negative for depression, memory loss and substance abuse. The patient is not nervous/anxious.        PHYSICAL EXAM:     Vitals:    07/29/25 1354   BP: (!) 138/56   Pulse: 101   Resp: 19    Body mass index is 36.51 kg/m².  Weight: 84.8 kg (186 lb 15.2 oz)   Height: 5' (152.4 cm)     Physical Exam  Vitals reviewed.   Constitutional:       General: She is not in acute distress.     Appearance: Normal appearance. She is well-developed. She is obese. She is not diaphoretic.   HENT:      Head: Normocephalic.   Neck:      Vascular: No carotid bruit or JVD.   Cardiovascular:      Rate and Rhythm: Normal rate and regular rhythm.      Pulses: Normal pulses.      Heart sounds: Murmur heard.      Crescendo decrescendo systolic murmur is present with a grade of 3/6.   Pulmonary:      Effort: Pulmonary effort is normal.      Breath sounds: Normal breath sounds.   Abdominal:      General: Bowel sounds are normal.      Palpations: Abdomen is soft.      Tenderness: There is no abdominal tenderness.   Skin:     General: Skin is warm and dry.   Neurological:      Mental Status: She is alert and oriented to person, place, and time.   Psychiatric:         Speech: Speech normal.         Behavior: Behavior normal.         Thought Content: Thought content normal.         DATA:   EKG: (personally reviewed tracing)    Results for orders placed or performed in visit on 06/11/25   IN OFFICE EKG 12-LEAD (to [place])    Collection Time: 06/11/25  1:55 PM   Result Value Ref Range    QRS Duration 124 ms    [facility]HS QTC Calculation 465 ms    Narrative    Test Reason : R07.9,    Vent. Rate :  69 BPM     Atrial Rate :  69 BPM     P-R Int : 148 ms          QRS Dur : 124 ms      QT Int : 434 ms       P-R-T Axes :  40  33  30 degrees    QTcB Int : 465 ms    Normal sinus rhythm  Right bundle branch block  Abnormal ECG  When compared with ECG of 14-Nov-2024 09:45,  No significant change was found  Confirmed by Real Patrick  ([id number]) on 6/17/2025 9:33:25 PM    Referred By:            Confirmed By: Real Patrick        Laboratory:  CBC:  Recent Labs   Lab 01/10/24  1153 07/02/24  1143 08/01/24  1503   WBC 6.69 5.70 7.95   Hemoglobin 12.0 11.8 L 12.4   Hematocrit 37.2 37.6 37.7   Platelets 230 233 247       CHEMISTRIES:  Recent Labs   Lab 09/24/24  1033 02/12/25  1337 06/11/25  1520   Glucose 101 99 107   Sodium 140 140 139   Potassium 4.2 3.6 3.7   BUN 15 16 14   Creatinine 1.0 1.0 1.0   Calcium 9.9 10.0 9.7       CARDIAC BIOMARKERS:        COAGS:        LIPIDS/LFTS:  Recent Labs   Lab 09/19/22  1129 05/08/23  1710 07/02/24  1143 09/24/24  1033 02/12/25  1337 06/11/25  1520   Cholesterol 262 H  --  234 H  --   --   --    Triglycerides 68  --  87  --   --   --    HDL 74  --  64  --   --   --    LDL Cholesterol 174.4 H  --  152.6  --   --   --    Non-HDL Cholesterol 188  --  170  --   --   --    AST  --    < > 28 25 25 17   ALT  --    < > 20 22 20 16    < > = values in this interval not displayed.       Hemoglobin A1C   Date Value Ref Range Status   02/12/2025 5.7 (H) 4.0 - 5.6 % Final     Comment:     ADA Screening Guidelines:  5.7-6.4%  Consistent with prediabetes  >or=6.5%  Consistent with diabetes    High levels of fetal hemoglobin interfere with the HbA1C  assay. Heterozygous hemoglobin variants (HbS, HgC, etc)do  not significantly interfere with this assay.   However, presence of multiple variants may affect accuracy.     07/02/2024 5.5 4.0 - 5.6 % Final     Comment:     ADA Screening Guidelines:  5.7-6.4%  Consistent with prediabetes  >or=6.5%  Consistent with diabetes    High levels of fetal hemoglobin interfere with the HbA1C  assay. Heterozygous hemoglobin variants (HbS, HgC, etc)do  not significantly interfere with this assay.   However, presence of multiple variants may affect accuracy.     11/15/2021 5.9 4.5 - 6.2 % Final     Comment:     According to ADA guidelines, hemoglobin A1C <7.0% represents  optimal control in  non-pregnant diabetic patients.  Different  metrics may apply to specific populations.   Standards of Medical Care in Diabetes - 2016.    For the purpose of screening for the presence of diabetes:  <5.7%     Consistent with the absence of diabetes  5.7-6.4%  Consistent with increasing risk for diabetes   (prediabetes)  >or=6.5%  Consistent with diabetes    Currently no consensus exists for use of hemoglobin A1C  for diagnosis of diabetes for children.          The 10-year ASCVD risk score (Steven DK, et al., 2019) is: 32.5%    Values used to calculate the score:      Age: 79 years      Sex: Female      Is Non- : No      Diabetic: No      Tobacco smoker: No      Systolic Blood Pressure: 138 mmHg      Is BP treated: Yes      HDL Cholesterol: 64 mg/dL      Total Cholesterol: 234 mg/dL      Cardiovascular Testing:    Cardiac PET Stress 07/23/2025:      The myocardial perfusion images show no evidence of transmural ischemia or scar.    CT attenuation images demonstrate severe diffuse coronary calcifications in the LAD, LCX and RCA territory.    The gated perfusion images showed an ejection fraction of 77% at rest and 70% during stress. A normal ejection fraction is greater than 53%.    There is normal wall motion at rest and normal wall motion during stress.    The study's ECG is positive for ischemia.    While there is no transmural ischemia, there is diffuse subendocardial ischemia in 10-19% of myocardium likely related to diastolic dysfunction and diffuse disease.  Concordant with ECG. No targets for revascularization. Consider agressive medical therapy.    Echo  Result Date: 6/25/2025    Left Ventricle: The left ventricle is normal in size. Normal wall   thickness. There is concentric remodeling. Normal wall motion. There is   normal systolic function with a visually estimated ejection fraction of 60   - 65%. There is normal diastolic function.    Right Ventricle: The right ventricle is normal  in size measuring 2.2   cm. Wall thickness is normal. Systolic function is normal.    Aortic Valve: There is moderate aortic valve sclerosis. Moderately   calcified noncoronary cusp. There is moderate annular calcification   present. Moderately restricted motion. Aortic valve peak velocity is 2.4   m/s.Peak aortic valve gradient 23 mm is noted.  Mean gradient is 13 mmHg.    Mitral Valve: There is moderate posterior leaflet sclerosis.    IVC/SVC: Normal venous pressure at 3 mmHg.        Nuclear stress 11/26/2024:      Normal myocardial perfusion scan. There is no evidence of myocardial ischemia or infarction.    The gated perfusion images showed an ejection fraction of 69% at rest. The gated perfusion images showed an ejection fraction of 70% post stress. Normal ejection fraction is greater than 53%.    There is normal wall motion at rest and post-stress.    LV cavity size is normal at rest and normal at post-stress.    The ECG portion of the study is abnormal but not diagnostic due to resting ST-T abnormalities.    The patient reported no chest pain during the stress test.    There were no arrhythmias during stress.    Lower extremity arterial ultrasound 07/25/2024:    Right lower extremity arterial ultrasound shows no hemodynamically significant stenosis.    Left lower extremity arterial ultrasound shows no hemodynamically significant stenosis.      ABIs 07/25/2024:    Right lower extremity pressures and waveforms indicate no hemodynamically significant arterial occlusive disease.  Left lower extremity pressures and waveforms indicate no hemodynamically significant arterial occlusive disease.    Echocardiogram 12/07/2023:      Left Ventricle: The left ventricle is normal in size. Mildly increased wall thickness. There is mild concentric hypertrophy. Normal wall motion. There is normal systolic function with a visually estimated ejection fraction of 65 - 70%. Grade I diastolic dysfunction.    Right Ventricle: Normal  right ventricular cavity size. Systolic function is normal.    Aortic Valve: The aortic valve is a trileaflet valve. There is moderate aortic valve sclerosis. There is mild stenosis. Aortic valve area by VTI is 1.77 cm². Aortic valve peak velocity is 2.57 m/s. Mean gradient is 16 mmHg. The dimensionless index is 0.54.    Pulmonary Artery: The estimated pulmonary artery systolic pressure is 33 mmHg.    Nuclear stress 10/09/2023:      Normal myocardial perfusion scan. There is no evidence of myocardial ischemia or infarction.    There is a trivial to mild intensity perfusion abnormality in the anteroapical wall of the left ventricle, secondary to breast attenuation.    The gated perfusion images showed an ejection fraction of 90% at rest. The gated perfusion images showed an ejection fraction of 73% post stress. Normal ejection fraction is greater than 53%.    There is normal wall motion at rest and post stress.    LV cavity size is normal at rest and normal at stress.    The ECG portion of the study is negative for ischemia.    The patient reported no chest pain during the stress test.    There were no arrhythmias during stress.    ASSESSMENT:     Shortness of breath on exertion  Chronic diastolic heart failure  Coronary calcifications on CT scan  Hypertension  Hyperlipidemia:  ezetimibe 10  Aortic stenosis  Pulmonary hypertension  Aortic atherosclerosis  Obstructive sleep apnea  Obesity    PLAN:     Shortness of breath on exertion: Secondary to diastolic heart failure add Lasix.  Titrate as tolerated.  Monitor symptoms.  If no improvement likely proceed with right/left heart catheterization.  Chronic diastolic heart failure: Add Lasix 20 mg p.o. q.d..  Titrate.  Coronary calcifications on CT scan:  Continue aspirin.  Consider angiography if no improvement in symptoms.  Hypertension: Monitor.  Hyperlipidemia: History of statin intolerance.  On Zetia.  Will need PCSK9 to reach goal.  Aortic stenosis:  Not  significant by last echocardiogram.  Return to clinic 2 weeks.           Jacob House MD, MPH, FAC, [facility]         [1]   Social History  Socioeconomic History    Marital status:     Number of children: 3   Occupational History    Occupation: teacher, phone  - retired   Tobacco Use    Smoking status: Never    Smokeless tobacco: Never   Substance and Sexual Activity    Alcohol use: No    Drug use: No    Sexual activity: Not Currently     Partners: Male     Birth control/protection: None     Social Drivers of Health     Financial Resource Strain: Low Risk  (1/13/2025)    Overall Financial Resource Strain (CARDIA)     Difficulty of Paying Living Expenses: Not very hard   Food Insecurity: Food Insecurity Present (1/13/2025)    Hunger Vital Sign     Worried About Running Out of Food in the Last Year: Sometimes true     Ran Out of Food in the Last Year: Sometimes true   Transportation Needs: No Transportation Needs (12/4/2023)    PRAPARE - Transportation     Lack of Transportation (Medical): No     Lack of Transportation (Non-Medical): No   Physical Activity: Inactive (1/13/2025)    Exercise Vital Sign     Days of Exercise per Week: 0 days     Minutes of Exercise per Session: 0 min   Stress: No Stress Concern Present (1/13/2025)    [employer] - Occupational Stress Questionnaire     Feeling of Stress : Not at all   Housing Stability: Low Risk  (12/4/2023)    Housing Stability Vital Sign     Unable to Pay for Housing in the Last Year: No     Number of Places Lived in the Last Year: 1     Unstable Housing in the Last Year: No

## 2025-07-29 NOTE — TELEPHONE ENCOUNTER
----- Message from Sherry Miles NP sent at 7/24/2025  4:22 PM CDT -----  Recommend cardiac catheterization. I will place orders.    Attempted to call the patient twice and her daughter but neither had voicemail confirming their name.  Would like for the patient and daughter to both come in to discuss with me next week.  This can even be a nurse visit to sign the papers and I will come over and see.  ----- Message -----  From: Daniele Vicente MD  Sent: 7/23/2025   2:38 PM CDT  To: Sherry Miles NP     This is a recent snapshot of the patient's [facility] medical record.  For current drug dose and complete information and questions, call [phone] or In Basket pool, [facility] ([id number])  CSN Number:  [id number]

## 2025-07-29 NOTE — TELEPHONE ENCOUNTER
Spoke with Iram who explained she canceled her appointment on yesterday because she has an appointment with her cardiologist Dr. House today. And would like an appointment with Gato Spain MD today. Nurse explained Gato Spain MD do not have any availability today and she already has the next available with Gato Spain MD. Patient verbalized understanding and stated she will keep the appointment on 10/01/2025.

## 2025-07-30 ENCOUNTER — TELEPHONE (OUTPATIENT)
Dept: CARDIOLOGY | Facility: CLINIC | Age: 80
End: 2025-07-30
Payer: MEDICARE

## 2025-07-30 NOTE — TELEPHONE ENCOUNTER
Copied from CRM #[id number]. Topic: Appointments - Appointment Access  >> Jul 30, 2025 12:36 PM Ayaka wrote:  Type:  Appointment Request    Caller is requesting a sooner appointment.  Caller declined first available appointment listed below.  Caller will not accept being placed on the waitlist and is requesting a message be sent to doctor.    Name of Caller:  Patient  When is the first available appointment?  N/A    Would the patient rather a call back or a response via [facility]?   Call back  Best Call Back Number:  [phone]    Additional Information:   States she would like to speak with someone about her appointments and pre-admit with Sherry Miles - states her appointments are not showing up in the portal - please call - thank you

## 2025-07-30 NOTE — TELEPHONE ENCOUNTER
Copied from CRM #[id number]. Topic: General Inquiry - Patient Advice  >> Jul 30, 2025  8:41 AM Stefania wrote:  Type:  Needs Medical Advice    Who Called: pt     Best Call Back Number: [phone]    Additional Information: pt st that she wants to move forward with her procedure. Please call to discuss.

## 2025-08-06 ENCOUNTER — TELEPHONE (OUTPATIENT)
Dept: CARDIOLOGY | Facility: CLINIC | Age: 80
End: 2025-08-06
Payer: MEDICARE

## 2025-08-07 ENCOUNTER — HOSPITAL ENCOUNTER (EMERGENCY)
Facility: HOSPITAL | Age: 80
Discharge: HOME | End: 2025-08-07
Attending: EMERGENCY MEDICINE
Payer: MEDICARE

## 2025-08-07 VITALS
OXYGEN SATURATION: 97 % | TEMPERATURE: 98 F | HEIGHT: 60 IN | WEIGHT: 183 LBS | HEART RATE: 84 BPM | RESPIRATION RATE: 18 BRPM | DIASTOLIC BLOOD PRESSURE: 63 MMHG | SYSTOLIC BLOOD PRESSURE: 137 MMHG | BODY MASS INDEX: 35.93 KG/M2

## 2025-08-07 DIAGNOSIS — R06.09 DOE (DYSPNEA ON EXERTION): ICD-10-CM

## 2025-08-07 DIAGNOSIS — R52 PAIN: ICD-10-CM

## 2025-08-07 DIAGNOSIS — M25.562 ACUTE PAIN OF LEFT KNEE: Primary | ICD-10-CM

## 2025-08-07 DIAGNOSIS — M25.462 KNEE EFFUSION, LEFT: ICD-10-CM

## 2025-08-07 DIAGNOSIS — R94.39 ABNORMAL NUCLEAR STRESS TEST: Primary | ICD-10-CM

## 2025-08-07 DIAGNOSIS — M17.12 OSTEOARTHRITIS OF LEFT KNEE, UNSPECIFIED OSTEOARTHRITIS TYPE: ICD-10-CM

## 2025-08-07 DIAGNOSIS — R79.1 ABNORMAL COAGULATION PROFILE: ICD-10-CM

## 2025-08-07 PROCEDURE — 73562 X-RAY EXAM OF KNEE 3: CPT | Mod: TC,LT

## 2025-08-07 PROCEDURE — 94760 N-INVAS EAR/PLS OXIMETRY 1: CPT

## 2025-08-07 PROCEDURE — 99283 EMERGENCY DEPT VISIT LOW MDM: CPT | Mod: 25

## 2025-08-07 PROCEDURE — 73562 X-RAY EXAM OF KNEE 3: CPT | Mod: 26,LT,, | Performed by: RADIOLOGY

## 2025-08-07 RX ORDER — SODIUM CHLORIDE 9 MG/ML
INJECTION, SOLUTION INTRAVENOUS ONCE
OUTPATIENT
Start: 2025-08-07 | End: 2025-08-07

## 2025-08-07 RX ORDER — HYDROCODONE BITARTRATE AND ACETAMINOPHEN 5; 325 MG/1; MG/1
1 TABLET ORAL
Refills: 0 | Status: DISCONTINUED | OUTPATIENT
Start: 2025-08-07 | End: 2025-08-07

## 2025-08-07 RX ORDER — ONDANSETRON 4 MG/1
4 TABLET, ORALLY DISINTEGRATING ORAL
Status: DISCONTINUED | OUTPATIENT
Start: 2025-08-07 | End: 2025-08-07

## 2025-08-07 RX ORDER — DIPHENHYDRAMINE HCL 50 MG
50 CAPSULE ORAL
OUTPATIENT
Start: 2025-08-07

## 2025-08-14 ENCOUNTER — OFFICE VISIT (OUTPATIENT)
Dept: ORTHOPEDICS | Facility: CLINIC | Age: 80
End: 2025-08-14
Payer: MEDICARE

## 2025-08-14 VITALS — WEIGHT: 183.63 LBS | HEIGHT: 60 IN | BODY MASS INDEX: 36.05 KG/M2

## 2025-08-14 DIAGNOSIS — M17.12 PRIMARY OSTEOARTHRITIS OF LEFT KNEE: Primary | ICD-10-CM

## 2025-08-14 PROCEDURE — 99213 OFFICE O/P EST LOW 20 MIN: CPT | Mod: PBBFAC,PN | Performed by: ORTHOPAEDIC SURGERY

## 2025-08-14 PROCEDURE — 99999 PR PBB SHADOW E&M-EST. PATIENT-LVL III: CPT | Mod: PBBFAC,,, | Performed by: ORTHOPAEDIC SURGERY

## 2025-08-14 PROCEDURE — 99213 OFFICE O/P EST LOW 20 MIN: CPT | Mod: S$PBB,,, | Performed by: ORTHOPAEDIC SURGERY

## 2025-08-19 ENCOUNTER — TELEPHONE (OUTPATIENT)
Dept: CARDIOLOGY | Facility: CLINIC | Age: 80
End: 2025-08-19
Payer: MEDICARE

## 2025-08-21 ENCOUNTER — HOSPITAL ENCOUNTER (OUTPATIENT)
Dept: PREADMISSION TESTING | Facility: HOSPITAL | Age: 80
Discharge: HOME | End: 2025-08-21
Attending: STUDENT IN AN ORGANIZED HEALTH CARE EDUCATION/TRAINING PROGRAM
Payer: MEDICARE

## 2025-08-21 VITALS
OXYGEN SATURATION: 96 % | HEIGHT: 60 IN | HEART RATE: 67 BPM | BODY MASS INDEX: 35.96 KG/M2 | DIASTOLIC BLOOD PRESSURE: 73 MMHG | TEMPERATURE: 98 F | SYSTOLIC BLOOD PRESSURE: 134 MMHG | RESPIRATION RATE: 18 BRPM | WEIGHT: 183.19 LBS

## 2025-08-21 DIAGNOSIS — R06.09 DOE (DYSPNEA ON EXERTION): ICD-10-CM

## 2025-08-21 DIAGNOSIS — R94.39 ABNORMAL NUCLEAR STRESS TEST: ICD-10-CM

## 2025-08-21 DIAGNOSIS — R79.1 ABNORMAL COAGULATION PROFILE: ICD-10-CM

## 2025-08-21 DIAGNOSIS — R07.89 OTHER CHEST PAIN: ICD-10-CM

## 2025-08-21 LAB
ABSOLUTE EOSINOPHIL (SMH): 0.2 K/UL
ABSOLUTE MONOCYTE (SMH): 0.69 K/UL (ref 0.3–1)
ABSOLUTE NEUTROPHIL COUNT (SMH): 3.7 K/UL (ref 1.8–7.7)
ANION GAP (SMH): 8 MMOL/L (ref 8–16)
BASOPHILS # BLD AUTO: 0.06 K/UL
BASOPHILS NFR BLD AUTO: 0.9 %
BUN SERPL-MCNC: 29 MG/DL (ref 8–23)
CALCIUM SERPL-MCNC: 10.1 MG/DL (ref 8.7–10.5)
CHLORIDE SERPL-SCNC: 101 MMOL/L (ref 95–110)
CO2 SERPL-SCNC: 29 MMOL/L (ref 23–29)
CREAT SERPL-MCNC: 1.2 MG/DL (ref 0.5–1.4)
ERYTHROCYTE [DISTWIDTH] IN BLOOD BY AUTOMATED COUNT: 12.8 % (ref 11.5–14.5)
GFR SERPLBLD CREATININE-BSD FMLA CKD-EPI: 46 ML/MIN/1.73/M2
GLUCOSE SERPL-MCNC: 111 MG/DL (ref 70–110)
HCT VFR BLD AUTO: 37 % (ref 37–48.5)
HGB BLD-MCNC: 12.2 GM/DL (ref 12–16)
IMM GRANULOCYTES # BLD AUTO: 0.01 K/UL (ref 0–0.04)
IMM GRANULOCYTES NFR BLD AUTO: 0.1 % (ref 0–0.5)
LYMPHOCYTES # BLD AUTO: 2.4 K/UL (ref 1–4.8)
MCH RBC QN AUTO: 31.7 PG (ref 27–31)
MCHC RBC AUTO-ENTMCNC: 33 G/DL (ref 32–36)
MCV RBC AUTO: 96 FL (ref 82–98)
NUCLEATED RBC (/100WBC) (SMH): 0 /100 WBC
PLATELET # BLD AUTO: 237 K/UL (ref 150–450)
PMV BLD AUTO: 9.5 FL (ref 9.2–12.9)
POTASSIUM SERPL-SCNC: 3.4 MMOL/L (ref 3.5–5.1)
RBC # BLD AUTO: 3.85 M/UL (ref 4–5.4)
RELATIVE EOSINOPHIL (SMH): 2.8 % (ref 0–8)
RELATIVE LYMPHOCYTE (SMH): 34 % (ref 18–48)
RELATIVE MONOCYTE (SMH): 9.8 % (ref 4–15)
RELATIVE NEUTROPHIL (SMH): 52.4 % (ref 38–73)
SODIUM SERPL-SCNC: 138 MMOL/L (ref 136–145)
WBC # BLD AUTO: 7.05 K/UL (ref 3.9–12.7)

## 2025-08-21 PROCEDURE — 93005 ELECTROCARDIOGRAM TRACING: CPT | Performed by: INTERNAL MEDICINE

## 2025-08-21 PROCEDURE — 93010 ELECTROCARDIOGRAM REPORT: CPT | Mod: ,,, | Performed by: INTERNAL MEDICINE

## 2025-08-21 PROCEDURE — 84295 ASSAY OF SERUM SODIUM: CPT

## 2025-08-21 PROCEDURE — 36415 COLL VENOUS BLD VENIPUNCTURE: CPT

## 2025-08-21 PROCEDURE — 85025 COMPLETE CBC W/AUTO DIFF WBC: CPT

## 2025-08-22 LAB
OHS QRS DURATION: 122 MS
OHS QTC CALCULATION: 484 MS

## 2025-08-25 ENCOUNTER — OFFICE VISIT (OUTPATIENT)
Dept: URGENT CARE | Facility: CLINIC | Age: 80
End: 2025-08-25
Payer: MEDICARE

## 2025-08-25 VITALS
SYSTOLIC BLOOD PRESSURE: 120 MMHG | HEIGHT: 60 IN | OXYGEN SATURATION: 97 % | WEIGHT: 182 LBS | HEART RATE: 61 BPM | DIASTOLIC BLOOD PRESSURE: 64 MMHG | RESPIRATION RATE: 18 BRPM | TEMPERATURE: 100 F | BODY MASS INDEX: 35.73 KG/M2

## 2025-08-25 DIAGNOSIS — R09.81 NASAL CONGESTION: Primary | ICD-10-CM

## 2025-08-25 DIAGNOSIS — R14.1 ABDOMINAL GAS PAIN: ICD-10-CM

## 2025-08-25 DIAGNOSIS — R06.09 DOE (DYSPNEA ON EXERTION): ICD-10-CM

## 2025-08-25 DIAGNOSIS — K59.00 CONSTIPATION, UNSPECIFIED CONSTIPATION TYPE: ICD-10-CM

## 2025-08-25 DIAGNOSIS — I25.10 CORONARY ARTERY DISEASE, UNSPECIFIED VESSEL OR LESION TYPE, UNSPECIFIED WHETHER ANGINA PRESENT, UNSPECIFIED WHETHER NATIVE OR TRANSPLANTED HEART: Primary | ICD-10-CM

## 2025-08-25 LAB
CTP QC/QA: YES
SARS-COV+SARS-COV-2 AG RESP QL IA.RAPID: NEGATIVE

## 2025-08-25 PROCEDURE — 87811 SARS-COV-2 COVID19 W/OPTIC: CPT | Mod: QW,S$GLB,, | Performed by: NURSE PRACTITIONER

## 2025-08-25 PROCEDURE — 99214 OFFICE O/P EST MOD 30 MIN: CPT | Mod: S$GLB,,, | Performed by: NURSE PRACTITIONER

## 2025-08-26 ENCOUNTER — HOSPITAL ENCOUNTER (OUTPATIENT)
Facility: HOSPITAL | Age: 80
Discharge: HOME | End: 2025-08-26
Attending: STUDENT IN AN ORGANIZED HEALTH CARE EDUCATION/TRAINING PROGRAM | Admitting: STUDENT IN AN ORGANIZED HEALTH CARE EDUCATION/TRAINING PROGRAM
Payer: MEDICARE

## 2025-08-26 VITALS
HEART RATE: 70 BPM | OXYGEN SATURATION: 97 % | WEIGHT: 180.19 LBS | RESPIRATION RATE: 18 BRPM | DIASTOLIC BLOOD PRESSURE: 62 MMHG | HEIGHT: 60 IN | BODY MASS INDEX: 35.37 KG/M2 | SYSTOLIC BLOOD PRESSURE: 136 MMHG

## 2025-08-26 DIAGNOSIS — I25.10 CAD (CORONARY ARTERY DISEASE): ICD-10-CM

## 2025-08-26 DIAGNOSIS — R94.39 ABNORMAL NUCLEAR STRESS TEST: ICD-10-CM

## 2025-08-26 DIAGNOSIS — R06.09 DOE (DYSPNEA ON EXERTION): ICD-10-CM

## 2025-08-26 LAB — OHS CV CPX PATIENT HEIGHT IN: 60

## 2025-08-26 PROCEDURE — 96374 THER/PROPH/DIAG INJ IV PUSH: CPT | Performed by: STUDENT IN AN ORGANIZED HEALTH CARE EDUCATION/TRAINING PROGRAM

## 2025-08-26 PROCEDURE — 99152 MOD SED SAME PHYS/QHP 5/>YRS: CPT | Performed by: STUDENT IN AN ORGANIZED HEALTH CARE EDUCATION/TRAINING PROGRAM

## 2025-08-26 PROCEDURE — 63600175 PHARM REV CODE 636 W HCPCS

## 2025-08-26 PROCEDURE — C1887 CATHETER, GUIDING: HCPCS | Performed by: STUDENT IN AN ORGANIZED HEALTH CARE EDUCATION/TRAINING PROGRAM

## 2025-08-26 PROCEDURE — 99152 MOD SED SAME PHYS/QHP 5/>YRS: CPT | Mod: ,,, | Performed by: STUDENT IN AN ORGANIZED HEALTH CARE EDUCATION/TRAINING PROGRAM

## 2025-08-26 PROCEDURE — C1769 GUIDE WIRE: HCPCS | Performed by: STUDENT IN AN ORGANIZED HEALTH CARE EDUCATION/TRAINING PROGRAM

## 2025-08-26 PROCEDURE — 63600175 PHARM REV CODE 636 W HCPCS: Performed by: STUDENT IN AN ORGANIZED HEALTH CARE EDUCATION/TRAINING PROGRAM

## 2025-08-26 PROCEDURE — 93458 L HRT ARTERY/VENTRICLE ANGIO: CPT | Performed by: STUDENT IN AN ORGANIZED HEALTH CARE EDUCATION/TRAINING PROGRAM

## 2025-08-26 PROCEDURE — C1894 INTRO/SHEATH, NON-LASER: HCPCS | Performed by: STUDENT IN AN ORGANIZED HEALTH CARE EDUCATION/TRAINING PROGRAM

## 2025-08-26 PROCEDURE — 25000003 PHARM REV CODE 250: Performed by: STUDENT IN AN ORGANIZED HEALTH CARE EDUCATION/TRAINING PROGRAM

## 2025-08-26 PROCEDURE — 25000003 PHARM REV CODE 250

## 2025-08-26 PROCEDURE — 93458 L HRT ARTERY/VENTRICLE ANGIO: CPT | Mod: 26,,, | Performed by: STUDENT IN AN ORGANIZED HEALTH CARE EDUCATION/TRAINING PROGRAM

## 2025-08-26 RX ORDER — FENTANYL CITRATE 50 UG/ML
INJECTION, SOLUTION INTRAMUSCULAR; INTRAVENOUS
Status: DISCONTINUED | OUTPATIENT
Start: 2025-08-26 | End: 2025-08-26 | Stop reason: HOSPADM

## 2025-08-26 RX ORDER — NITROGLYCERIN 5 MG/ML
INJECTION, SOLUTION INTRAVENOUS
Status: DISCONTINUED | OUTPATIENT
Start: 2025-08-26 | End: 2025-08-26 | Stop reason: HOSPADM

## 2025-08-26 RX ORDER — HEPARIN SODIUM 10000 [USP'U]/ML
INJECTION, SOLUTION INTRAVENOUS; SUBCUTANEOUS
Status: DISCONTINUED | OUTPATIENT
Start: 2025-08-26 | End: 2025-08-26 | Stop reason: HOSPADM

## 2025-08-26 RX ORDER — MIDAZOLAM HYDROCHLORIDE 1 MG/ML
INJECTION INTRAMUSCULAR; INTRAVENOUS
Status: DISCONTINUED | OUTPATIENT
Start: 2025-08-26 | End: 2025-08-26 | Stop reason: HOSPADM

## 2025-08-26 RX ORDER — DIPHENHYDRAMINE HCL 25 MG
CAPSULE ORAL
Status: COMPLETED
Start: 2025-08-26 | End: 2025-08-26

## 2025-08-26 RX ORDER — LIDOCAINE HYDROCHLORIDE 10 MG/ML
INJECTION, SOLUTION EPIDURAL; INFILTRATION; INTRACAUDAL; PERINEURAL
Status: DISCONTINUED | OUTPATIENT
Start: 2025-08-26 | End: 2025-08-26 | Stop reason: HOSPADM

## 2025-08-26 RX ORDER — FUROSEMIDE 10 MG/ML
20 INJECTION INTRAMUSCULAR; INTRAVENOUS ONCE
Status: COMPLETED | OUTPATIENT
Start: 2025-08-26 | End: 2025-08-26

## 2025-08-26 RX ORDER — DIPHENHYDRAMINE HCL 25 MG
50 CAPSULE ORAL
Status: DISCONTINUED | OUTPATIENT
Start: 2025-08-26 | End: 2025-08-26 | Stop reason: HOSPADM

## 2025-08-26 RX ORDER — VERAPAMIL HYDROCHLORIDE 2.5 MG/ML
INJECTION INTRAVENOUS
Status: DISCONTINUED | OUTPATIENT
Start: 2025-08-26 | End: 2025-08-26 | Stop reason: HOSPADM

## 2025-08-26 RX ORDER — SODIUM CHLORIDE 9 MG/ML
INJECTION, SOLUTION INTRAVENOUS ONCE
Status: COMPLETED | OUTPATIENT
Start: 2025-08-26 | End: 2025-08-26

## 2025-08-26 RX ORDER — FUROSEMIDE 10 MG/ML
INJECTION INTRAMUSCULAR; INTRAVENOUS
Status: COMPLETED
Start: 2025-08-26 | End: 2025-08-26

## 2025-08-26 RX ADMIN — SODIUM CHLORIDE: 9 INJECTION, SOLUTION INTRAVENOUS at 09:08

## 2025-08-26 RX ADMIN — FUROSEMIDE 20 MG: 10 INJECTION, SOLUTION INTRAMUSCULAR; INTRAVENOUS at 03:08

## 2025-08-26 RX ADMIN — DIPHENHYDRAMINE HYDROCHLORIDE 50 MG: 25 CAPSULE ORAL at 09:08

## 2025-08-26 RX ADMIN — FUROSEMIDE 20 MG: 10 INJECTION INTRAMUSCULAR; INTRAVENOUS at 03:08

## 2025-08-29 ENCOUNTER — TELEPHONE (OUTPATIENT)
Dept: CARDIOLOGY | Facility: CLINIC | Age: 80
End: 2025-08-29
Payer: MEDICARE

## (undated) DEVICE — HEMOSTAT VASC BAND REG 24CM

## (undated) DEVICE — CONTRAST OMNIPAQUE 240 50ML

## (undated) DEVICE — CATH DXTERITY JL35 100CM 5FR

## (undated) DEVICE — GUIDEWIRE INQWIRE SE 3MM JTIP

## (undated) DEVICE — Device

## (undated) DEVICE — CATH DXTERITY JR40 100CM 5FR